# Patient Record
Sex: FEMALE | Race: WHITE | Employment: OTHER | URBAN - METROPOLITAN AREA
[De-identification: names, ages, dates, MRNs, and addresses within clinical notes are randomized per-mention and may not be internally consistent; named-entity substitution may affect disease eponyms.]

---

## 2018-06-03 ENCOUNTER — OFFICE VISIT (OUTPATIENT)
Dept: URGENT CARE | Facility: PHYSICIAN GROUP | Age: 78
End: 2018-06-03
Payer: MEDICARE

## 2018-06-03 VITALS
WEIGHT: 100 LBS | TEMPERATURE: 98.5 F | SYSTOLIC BLOOD PRESSURE: 158 MMHG | HEIGHT: 65 IN | HEART RATE: 72 BPM | RESPIRATION RATE: 18 BRPM | OXYGEN SATURATION: 95 % | DIASTOLIC BLOOD PRESSURE: 90 MMHG | BODY MASS INDEX: 16.66 KG/M2

## 2018-06-03 DIAGNOSIS — H61.23 BILATERAL IMPACTED CERUMEN: ICD-10-CM

## 2018-06-03 PROCEDURE — 69210 REMOVE IMPACTED EAR WAX UNI: CPT | Performed by: FAMILY MEDICINE

## 2018-06-03 ASSESSMENT — ENCOUNTER SYMPTOMS
NAUSEA: 0
VERTIGO: 0
FEVER: 0
HEADACHES: 0

## 2018-06-03 ASSESSMENT — PAIN SCALES - GENERAL: PAINLEVEL: NO PAIN

## 2018-06-03 NOTE — PATIENT INSTRUCTIONS
Cerumen Plug  A cerumen plug is having too much wax in your ear canal. The outer ear canal is lined with hairs and glands that secrete wax. This wax is called cerumen. This protects the ear canal. It also helps prevent material from entering the ear. Too much wax can cause a feeling of fullness in the ears, decreased hearing, ringing in the ears, or an earache. Sometimes your caregiver will remove a cerumen plug with an instrument called a curette. Or he/she may flush the ear canal with warm water from a syringe to remove the wax. You may simply be sent home to follow the home care instructions below for wax removal.  Generally ear wax does not have to be removed unless it is causing a problem such as one of those listed above. When too much wax is causing a problem, the following are a few home remedies which can be used to help this problem.  HOME CARE INSTRUCTIONS   · Put a couple drops of glycerin, baby oil, or mineral oil in the ear a couple times of day. Do this every day for several days. After putting the drops in, you will need to lay with the affected ear pointing up for a couple minutes. This allows the drops to remain in the canal and run down to the area of wax blockage. This will soften the wax plug. It may also make your hearing worse as the wax softens and blocks the canal even more.  · After a couple days, you may gently flush the ear canal with warm water from a syringe. Do this by pulling your ear up and back with your head tilted slightly forward and towards a pan to catch the water. This is most easily done with a helper. You can also accomplish the same thing by letting the shower beat into your ear canal to wash the wax out. Sometimes this will not be immediately successful. You will have to return to the first step of using the oil to further soften the wax. Then resume washing the ear canal out with a syringe or shower.  · Following removal of the wax, put ten to twenty drops of rubbing  alcohol into the outer ears. This will dry the canal and prevent an infection.  · Do not irrigate or wash out your ears if you have had a perforated ear drum or mastoid surgery.  SEEK IMMEDIATE MEDICAL CARE IF:   · You are unsuccessful with the above instructions for home care.  · You develop ear pain or drainage from the ear.  MAKE SURE YOU:   · Understand these instructions.  · Will watch your condition.  · Will get help right away if you are not doing well or get worse.  Document Released: 09/12/2002 Document Revised: 03/11/2013 Document Reviewed: 12/09/2009  [employer]® Patient Information ©2014 [employer], [place].

## 2018-06-03 NOTE — PROGRESS NOTES
"Subjective:   Tameka Montanez is a 77 y.o. female who presents for Cerumen Impaction (x 2 weeks)     Cerumen Impaction   This is a new problem. The current episode started 1 to 4 weeks ago. The problem occurs constantly. The problem has been gradually worsening. Pertinent negatives include no congestion, fever, headaches, nausea or vertigo.     Review of Systems   Constitutional: Negative for fever.   HENT: Negative for congestion.    Gastrointestinal: Negative for nausea.   Neurological: Negative for vertigo and headaches.      Objective:   /90   Pulse 72   Temp 36.9 °C (98.5 °F)   Resp 18   Ht 1.651 m (5' 5\")   Wt 45.4 kg (100 lb)   SpO2 95%   BMI 16.64 kg/m²   Physical Exam   Constitutional: She is oriented to person, place, and time. She appears well-developed and well-nourished. No distress.   HENT:   Bilateral cerumen impaction noted   Eyes: EOM are normal. Pupils are equal, round, and reactive to light.   Cardiovascular: Normal rate, regular rhythm, normal heart sounds and intact distal pulses.    Pulmonary/Chest: Effort normal and breath sounds normal. No respiratory distress.   Abdominal: Soft. Bowel sounds are normal. She exhibits no distension.   Musculoskeletal: Normal range of motion.   Neurological: She is alert and oriented to person, place, and time. She has normal reflexes.   Skin: Skin is warm and dry.   Psychiatric: She has a normal mood and affect. Her behavior is normal.        Assessment/Plan:   1. Bilateral impacted cerumen    Differential diagnosis, natural history, supportive care, and indications for immediate follow-up discussed.    Procedure: Cerumen Removal Right  Risks and benefits of procedure discussed  Cerumen removed with curette   Patient tolerated well  Post procedure exam with clear canal and normal TM  Procedure: Cerumen Removal Left  Risks and benefits of procedure discussed  Cerumen removed with curette  Patient tolerated well  Post procedure exam with " clear canal and normal TM

## 2019-06-28 ENCOUNTER — OFFICE VISIT (OUTPATIENT)
Dept: URGENT CARE | Facility: PHYSICIAN GROUP | Age: 79
End: 2019-06-28
Payer: MEDICARE

## 2019-06-28 ENCOUNTER — HOSPITAL ENCOUNTER (OUTPATIENT)
Dept: RADIOLOGY | Facility: MEDICAL CENTER | Age: 79
End: 2019-06-28
Attending: PHYSICIAN ASSISTANT
Payer: MEDICARE

## 2019-06-28 VITALS
TEMPERATURE: 98.8 F | OXYGEN SATURATION: 95 % | WEIGHT: 106.6 LBS | RESPIRATION RATE: 18 BRPM | SYSTOLIC BLOOD PRESSURE: 148 MMHG | BODY MASS INDEX: 17.76 KG/M2 | DIASTOLIC BLOOD PRESSURE: 80 MMHG | HEART RATE: 82 BPM | HEIGHT: 65 IN

## 2019-06-28 DIAGNOSIS — M25.531 RIGHT WRIST PAIN: ICD-10-CM

## 2019-06-28 DIAGNOSIS — S63.501A SPRAIN OF RIGHT WRIST, INITIAL ENCOUNTER: ICD-10-CM

## 2019-06-28 PROCEDURE — 99213 OFFICE O/P EST LOW 20 MIN: CPT | Performed by: PHYSICIAN ASSISTANT

## 2019-06-28 PROCEDURE — 73110 X-RAY EXAM OF WRIST: CPT | Mod: RT

## 2019-06-28 RX ORDER — ASPIRIN 81 MG/1
81 TABLET, CHEWABLE ORAL DAILY
Status: ON HOLD | COMMUNITY
End: 2020-02-24

## 2019-06-28 ASSESSMENT — ENCOUNTER SYMPTOMS
NUMBNESS: 0
SENSORY CHANGE: 0
TINGLING: 0
MUSCLE WEAKNESS: 0

## 2019-06-28 NOTE — PROGRESS NOTES
"Subjective:   Tameka Montanez is a 79 y.o. female who presents for Finger Injury (x 1 day, R thumb, radiates to wrist)       Wrist Pain    The incident occurred 12 to 24 hours ago. The incident occurred at home. Injury mechanism: trying to push plug into wall. The pain is present in the right wrist. The quality of the pain is described as aching. The pain does not radiate. The pain is mild. The pain has been constant since the incident. Pertinent negatives include no chest pain, muscle weakness, numbness or tingling. The symptoms are aggravated by palpation and movement. She has tried ice, heat and immobilization for the symptoms. The treatment provided mild relief.     Review of Systems   Cardiovascular: Negative for chest pain.   Musculoskeletal:        Positive for right wrist pain   Neurological: Negative for tingling, sensory change and numbness.       PMH:  has a past medical history of Peptic ulcer. She also has no past medical history of ASTHMA or Diabetes.    MEDS:   Current Outpatient Prescriptions:   •  aspirin (ASA) 81 MG Chew Tab chewable tablet, Take 81 mg by mouth every day., Disp: , Rfl:   •  DPH-Lido-AlHydr-MgHydr-Simeth SUSP, Spray  in mouth/throat. \"Magic Mouthwash\" 1:1:1 mix ratio. May substitute pre-made mix or carafate for lidocaine.  10cc gargle and spit every 2 hours as needed (Patient not taking: Reported on 6/28/2019), Disp: 240 mL, Rfl: 0    ALLERGIES:   Allergies   Allergen Reactions   • Sulfa Drugs    • Tetracycline        SURGHX:   Past Surgical History:   Procedure Laterality Date   • APPENDECTOMY         SOCHX:  reports that she has been smoking Cigarettes.  She has a 42.00 pack-year smoking history. She has never used smokeless tobacco. She reports that she does not drink alcohol or use drugs.    FH: Reviewed with patient, not pertinent to this visit.     Objective:   /80   Pulse 82   Temp 37.1 °C (98.8 °F) (Temporal)   Resp 18   Ht 1.651 m (5' 5\")   Wt 48.4 kg (106 " lb 9.6 oz)   SpO2 95%   BMI 17.74 kg/m²   Physical Exam   Constitutional: She is oriented to person, place, and time. She appears well-developed and well-nourished. No distress.   HENT:   Head: Normocephalic and atraumatic.   Nose: Nose normal.   Eyes: Conjunctivae and EOM are normal.   Neck: Normal range of motion. No tracheal deviation present.   Cardiovascular: Normal rate and regular rhythm.    Pulses:       Radial pulses are 2+ on the right side, and 2+ on the left side.   Pulmonary/Chest: Effort normal. No respiratory distress.   Musculoskeletal:        Right wrist: She exhibits tenderness, bony tenderness and swelling. She exhibits normal range of motion, no effusion, no crepitus, no deformity and no laceration.   Tenderness and swelling along lateral volar aspect of right wrist.    Neurological: She is alert and oriented to person, place, and time.   Skin: Skin is warm and dry. Capillary refill takes less than 2 seconds.   Psychiatric: She has a normal mood and affect. Her behavior is normal. Judgment and thought content normal.   Vitals reviewed.    - DX-WRIST-COMPLETE 3+ RIGHT   Impression: There is no acute displaced right wrist fracture.    Assessment/Plan:   1. Sprain of right wrist, initial encounter  - DX-WRIST-COMPLETE 3+ RIGHT; Future    Other orders  - aspirin (ASA) 81 MG Chew Tab chewable tablet; Take 81 mg by mouth every day.    - Advised to continue ice/heat  - Advised to continue wearing wrist brace   - Advised to try OTC ibuprofen/acetaminophen prn  - Advised to return if symptoms worsen or do not improve     Differential diagnosis, natural history, supportive care, and indications for immediate follow-up discussed.

## 2019-07-09 ENCOUNTER — HOSPITAL ENCOUNTER (OUTPATIENT)
Dept: LAB | Facility: MEDICAL CENTER | Age: 79
End: 2019-07-09
Attending: FAMILY MEDICINE
Payer: MEDICARE

## 2019-07-09 ENCOUNTER — OFFICE VISIT (OUTPATIENT)
Dept: URGENT CARE | Facility: PHYSICIAN GROUP | Age: 79
End: 2019-07-09
Payer: MEDICARE

## 2019-07-09 VITALS
TEMPERATURE: 97.6 F | SYSTOLIC BLOOD PRESSURE: 138 MMHG | DIASTOLIC BLOOD PRESSURE: 72 MMHG | BODY MASS INDEX: 17.66 KG/M2 | HEART RATE: 84 BPM | HEIGHT: 65 IN | WEIGHT: 106 LBS | OXYGEN SATURATION: 95 %

## 2019-07-09 DIAGNOSIS — R60.0 EDEMA OF BOTH LOWER EXTREMITIES: ICD-10-CM

## 2019-07-09 LAB
ALBUMIN SERPL BCP-MCNC: 3.5 G/DL (ref 3.2–4.9)
ALBUMIN/GLOB SERPL: 1.2 G/DL
ALP SERPL-CCNC: 142 U/L (ref 30–99)
ALT SERPL-CCNC: 87 U/L (ref 2–50)
ANION GAP SERPL CALC-SCNC: 8 MMOL/L (ref 0–11.9)
AST SERPL-CCNC: 128 U/L (ref 12–45)
BASOPHILS # BLD AUTO: 0.9 % (ref 0–1.8)
BASOPHILS # BLD: 0.08 K/UL (ref 0–0.12)
BILIRUB SERPL-MCNC: 0.9 MG/DL (ref 0.1–1.5)
BUN SERPL-MCNC: 12 MG/DL (ref 8–22)
CALCIUM SERPL-MCNC: 10.2 MG/DL (ref 8.5–10.5)
CHLORIDE SERPL-SCNC: 96 MMOL/L (ref 96–112)
CO2 SERPL-SCNC: 27 MMOL/L (ref 20–33)
CREAT SERPL-MCNC: 0.68 MG/DL (ref 0.5–1.4)
EOSINOPHIL # BLD AUTO: 0.08 K/UL (ref 0–0.51)
EOSINOPHIL NFR BLD: 0.9 % (ref 0–6.9)
ERYTHROCYTE [DISTWIDTH] IN BLOOD BY AUTOMATED COUNT: 56.1 FL (ref 35.9–50)
FASTING STATUS PATIENT QL REPORTED: NORMAL
GLOBULIN SER CALC-MCNC: 3 G/DL (ref 1.9–3.5)
GLUCOSE SERPL-MCNC: 77 MG/DL (ref 65–99)
HCT VFR BLD AUTO: 39.7 % (ref 37–47)
HGB BLD-MCNC: 13.5 G/DL (ref 12–16)
IMM GRANULOCYTES # BLD AUTO: 0.03 K/UL (ref 0–0.11)
IMM GRANULOCYTES NFR BLD AUTO: 0.3 % (ref 0–0.9)
LYMPHOCYTES # BLD AUTO: 1.42 K/UL (ref 1–4.8)
LYMPHOCYTES NFR BLD: 15.8 % (ref 22–41)
MCH RBC QN AUTO: 32.5 PG (ref 27–33)
MCHC RBC AUTO-ENTMCNC: 34 G/DL (ref 33.6–35)
MCV RBC AUTO: 95.7 FL (ref 81.4–97.8)
MONOCYTES # BLD AUTO: 0.96 K/UL (ref 0–0.85)
MONOCYTES NFR BLD AUTO: 10.7 % (ref 0–13.4)
NEUTROPHILS # BLD AUTO: 6.39 K/UL (ref 2–7.15)
NEUTROPHILS NFR BLD: 71.4 % (ref 44–72)
NRBC # BLD AUTO: 0 K/UL
NRBC BLD-RTO: 0 /100 WBC
PLATELET # BLD AUTO: 176 K/UL (ref 164–446)
PMV BLD AUTO: 10.7 FL (ref 9–12.9)
POTASSIUM SERPL-SCNC: 4.1 MMOL/L (ref 3.6–5.5)
PROT SERPL-MCNC: 6.5 G/DL (ref 6–8.2)
RBC # BLD AUTO: 4.15 M/UL (ref 4.2–5.4)
SODIUM SERPL-SCNC: 131 MMOL/L (ref 135–145)
WBC # BLD AUTO: 9 K/UL (ref 4.8–10.8)

## 2019-07-09 PROCEDURE — 36415 COLL VENOUS BLD VENIPUNCTURE: CPT

## 2019-07-09 PROCEDURE — 80053 COMPREHEN METABOLIC PANEL: CPT

## 2019-07-09 PROCEDURE — 99214 OFFICE O/P EST MOD 30 MIN: CPT | Performed by: FAMILY MEDICINE

## 2019-07-09 PROCEDURE — 85025 COMPLETE CBC W/AUTO DIFF WBC: CPT

## 2019-07-09 NOTE — PROGRESS NOTES
"Subjective:   Tameka Montanez is a 79 y.o. female who presents for Edema (ankles, x 3 days)        Edema   This is a new problem. The current episode started in the past 7 days. The problem occurs constantly. The problem has been waxing and waning. Pertinent negatives include no abdominal pain, chills, fever, nausea, sore throat or vomiting.     Review of Systems   Constitutional: Negative for chills and fever.   HENT: Negative for sore throat.    Gastrointestinal: Negative for abdominal pain, nausea and vomiting.     Allergies   Allergen Reactions   • Sulfa Drugs    • Tetracycline       Objective:   /72   Pulse 84   Temp 36.4 °C (97.6 °F) (Temporal)   Ht 1.651 m (5' 5\")   Wt 48.1 kg (106 lb)   SpO2 95%   BMI 17.64 kg/m²   Physical Exam   Constitutional: She is oriented to person, place, and time. She appears well-developed and well-nourished. No distress.   HENT:   Head: Normocephalic and atraumatic.   Eyes: Pupils are equal, round, and reactive to light. Conjunctivae and EOM are normal.   Cardiovascular: Normal rate and regular rhythm.    No murmur heard.  Pulmonary/Chest: Effort normal and breath sounds normal. No respiratory distress.   Abdominal: Soft. She exhibits no distension. There is no tenderness.   Musculoskeletal: She exhibits edema (3+ BLE to above knees).   Neurological: She is alert and oriented to person, place, and time. She has normal reflexes. No sensory deficit.   Skin: Skin is warm and dry.   Psychiatric: She has a normal mood and affect.         Assessment/Plan:   1. Edema of both lower extremities  - Comp Metabolic Panel; Future  - CBC WITH DIFFERENTIAL; Future  Concern for liver failure, AKF or similar.  Differential diagnosis, natural history, supportive care, and indications for immediate follow-up discussed.       "

## 2019-07-10 ASSESSMENT — ENCOUNTER SYMPTOMS
EDEMA: 1
ABDOMINAL PAIN: 0
NAUSEA: 0
VOMITING: 0
SORE THROAT: 0
CHILLS: 0
FEVER: 0

## 2020-02-13 ENCOUNTER — HOSPITAL ENCOUNTER (INPATIENT)
Facility: MEDICAL CENTER | Age: 80
LOS: 11 days | DRG: 329 | End: 2020-02-24
Attending: HOSPITALIST | Admitting: HOSPITALIST
Payer: MEDICARE

## 2020-02-13 ENCOUNTER — HOSPITAL ENCOUNTER (OUTPATIENT)
Facility: MEDICAL CENTER | Age: 80
DRG: 329 | End: 2020-02-13
Admitting: HOSPITALIST
Payer: MEDICARE

## 2020-02-13 DIAGNOSIS — K92.2 GASTROINTESTINAL HEMORRHAGE, UNSPECIFIED GASTROINTESTINAL HEMORRHAGE TYPE: ICD-10-CM

## 2020-02-13 DIAGNOSIS — D64.9 ANEMIA REQUIRING TRANSFUSIONS: ICD-10-CM

## 2020-02-13 DIAGNOSIS — K63.89 COLONIC MASS: ICD-10-CM

## 2020-02-13 DIAGNOSIS — J81.0 ACUTE PULMONARY EDEMA (HCC): ICD-10-CM

## 2020-02-13 PROCEDURE — 770020 HCHG ROOM/CARE - TELE (206)

## 2020-02-13 RX ORDER — LISINOPRIL 5 MG/1
5 TABLET ORAL DAILY
Status: ON HOLD | COMMUNITY
End: 2020-02-24

## 2020-02-13 RX ORDER — FUROSEMIDE 40 MG/1
20 TABLET ORAL DAILY
Status: ON HOLD | COMMUNITY
End: 2020-02-24

## 2020-02-13 RX ORDER — TRAMADOL HYDROCHLORIDE 50 MG/1
50 TABLET ORAL PRN
Status: ON HOLD | COMMUNITY
End: 2020-02-24

## 2020-02-13 RX ORDER — POTASSIUM CITRATE 5 MEQ/1
20 TABLET, EXTENDED RELEASE ORAL DAILY
Status: ON HOLD | COMMUNITY
End: 2020-02-24

## 2020-02-13 RX ORDER — METOPROLOL SUCCINATE 25 MG/1
25 TABLET, EXTENDED RELEASE ORAL DAILY
Status: ON HOLD | COMMUNITY
End: 2020-02-24

## 2020-02-13 RX ORDER — ATORVASTATIN CALCIUM 20 MG/1
40 TABLET, FILM COATED ORAL DAILY
Status: ON HOLD | COMMUNITY
End: 2020-02-24

## 2020-02-13 RX ORDER — CLOPIDOGREL BISULFATE 75 MG/1
75 TABLET ORAL DAILY
Status: ON HOLD | COMMUNITY
End: 2020-02-24

## 2020-02-13 RX ORDER — BUSPIRONE HYDROCHLORIDE 5 MG/1
5 TABLET ORAL 3 TIMES DAILY
COMMUNITY

## 2020-02-13 ASSESSMENT — LIFESTYLE VARIABLES
AVERAGE NUMBER OF DAYS PER WEEK YOU HAVE A DRINK CONTAINING ALCOHOL: 0
EVER_SMOKED: YES
HOW MANY TIMES IN THE PAST YEAR HAVE YOU HAD 5 OR MORE DRINKS IN A DAY: 0
EVER FELT BAD OR GUILTY ABOUT YOUR DRINKING: NO
ON A TYPICAL DAY WHEN YOU DRINK ALCOHOL HOW MANY DRINKS DO YOU HAVE: 0
TOTAL SCORE: 0
PACK_YEARS: 0.5
CONSUMPTION TOTAL: NEGATIVE
HAVE PEOPLE ANNOYED YOU BY CRITICIZING YOUR DRINKING: NO
EVER HAD A DRINK FIRST THING IN THE MORNING TO STEADY YOUR NERVES TO GET RID OF A HANGOVER: NO
HAVE YOU EVER FELT YOU SHOULD CUT DOWN ON YOUR DRINKING: NO
ALCOHOL_USE: NO

## 2020-02-13 ASSESSMENT — PATIENT HEALTH QUESTIONNAIRE - PHQ9
1. LITTLE INTEREST OR PLEASURE IN DOING THINGS: NOT AT ALL
2. FEELING DOWN, DEPRESSED, IRRITABLE, OR HOPELESS: NOT AT ALL
SUM OF ALL RESPONSES TO PHQ9 QUESTIONS 1 AND 2: 0

## 2020-02-14 ENCOUNTER — ANESTHESIA EVENT (OUTPATIENT)
Dept: SURGERY | Facility: MEDICAL CENTER | Age: 80
DRG: 329 | End: 2020-02-14
Payer: MEDICARE

## 2020-02-14 ENCOUNTER — ANESTHESIA (OUTPATIENT)
Dept: SURGERY | Facility: MEDICAL CENTER | Age: 80
DRG: 329 | End: 2020-02-14
Payer: MEDICARE

## 2020-02-14 PROBLEM — D64.9 ANEMIA REQUIRING TRANSFUSIONS: Status: ACTIVE | Noted: 2020-02-14

## 2020-02-14 PROBLEM — E78.5 HLD (HYPERLIPIDEMIA): Status: ACTIVE | Noted: 2020-02-14

## 2020-02-14 PROBLEM — K92.2 GIB (GASTROINTESTINAL BLEEDING): Status: ACTIVE | Noted: 2020-02-14

## 2020-02-14 PROBLEM — J44.1 CHRONIC OBSTRUCTIVE PULMONARY DISEASE WITH ACUTE EXACERBATION (HCC): Status: ACTIVE | Noted: 2020-02-14

## 2020-02-14 PROBLEM — I10 HTN (HYPERTENSION): Status: ACTIVE | Noted: 2020-02-14

## 2020-02-14 PROBLEM — I73.9 PAD (PERIPHERAL ARTERY DISEASE) (HCC): Status: ACTIVE | Noted: 2020-02-14

## 2020-02-14 LAB
ABO + RH BLD: NORMAL
ABO GROUP BLD: NORMAL
ANION GAP SERPL CALC-SCNC: 9 MMOL/L (ref 0–11.9)
BLD GP AB SCN SERPL QL: NORMAL
BUN SERPL-MCNC: 39 MG/DL (ref 8–22)
CALCIUM SERPL-MCNC: 8.5 MG/DL (ref 8.5–10.5)
CHLORIDE SERPL-SCNC: 111 MMOL/L (ref 96–112)
CO2 SERPL-SCNC: 20 MMOL/L (ref 20–33)
CREAT SERPL-MCNC: 0.81 MG/DL (ref 0.5–1.4)
EKG IMPRESSION: NORMAL
ERYTHROCYTE [DISTWIDTH] IN BLOOD BY AUTOMATED COUNT: 62.2 FL (ref 35.9–50)
GLUCOSE SERPL-MCNC: 78 MG/DL (ref 65–99)
HCT VFR BLD AUTO: 25.4 % (ref 37–47)
HGB BLD-MCNC: 7.7 G/DL (ref 12–16)
HGB BLD-MCNC: 8.3 G/DL (ref 12–16)
HGB BLD-MCNC: 8.4 G/DL (ref 12–16)
HGB BLD-MCNC: 8.5 G/DL (ref 12–16)
MCH RBC QN AUTO: 23.8 PG (ref 27–33)
MCHC RBC AUTO-ENTMCNC: 30.3 G/DL (ref 33.6–35)
MCV RBC AUTO: 78.6 FL (ref 81.4–97.8)
PLATELET # BLD AUTO: 135 K/UL (ref 164–446)
PMV BLD AUTO: 10.4 FL (ref 9–12.9)
POTASSIUM SERPL-SCNC: 3.5 MMOL/L (ref 3.6–5.5)
RBC # BLD AUTO: 3.23 M/UL (ref 4.2–5.4)
RH BLD: NORMAL
SODIUM SERPL-SCNC: 140 MMOL/L (ref 135–145)
WBC # BLD AUTO: 5.1 K/UL (ref 4.8–10.8)

## 2020-02-14 PROCEDURE — 86901 BLOOD TYPING SEROLOGIC RH(D): CPT

## 2020-02-14 PROCEDURE — 99223 1ST HOSP IP/OBS HIGH 75: CPT | Performed by: HOSPITALIST

## 2020-02-14 PROCEDURE — 86850 RBC ANTIBODY SCREEN: CPT

## 2020-02-14 PROCEDURE — A9270 NON-COVERED ITEM OR SERVICE: HCPCS | Performed by: HOSPITALIST

## 2020-02-14 PROCEDURE — 0DJ08ZZ INSPECTION OF UPPER INTESTINAL TRACT, VIA NATURAL OR ARTIFICIAL OPENING ENDOSCOPIC: ICD-10-PCS | Performed by: INTERNAL MEDICINE

## 2020-02-14 PROCEDURE — 160048 HCHG OR STATISTICAL LEVEL 1-5: Performed by: INTERNAL MEDICINE

## 2020-02-14 PROCEDURE — 500066 HCHG BITE BLOCK, ECT: Performed by: INTERNAL MEDICINE

## 2020-02-14 PROCEDURE — 85018 HEMOGLOBIN: CPT | Mod: 91

## 2020-02-14 PROCEDURE — 770020 HCHG ROOM/CARE - TELE (206)

## 2020-02-14 PROCEDURE — 700105 HCHG RX REV CODE 258: Performed by: HOSPITALIST

## 2020-02-14 PROCEDURE — 160002 HCHG RECOVERY MINUTES (STAT): Performed by: INTERNAL MEDICINE

## 2020-02-14 PROCEDURE — 36415 COLL VENOUS BLD VENIPUNCTURE: CPT

## 2020-02-14 PROCEDURE — 93005 ELECTROCARDIOGRAM TRACING: CPT | Performed by: INTERNAL MEDICINE

## 2020-02-14 PROCEDURE — 700102 HCHG RX REV CODE 250 W/ 637 OVERRIDE(OP): Performed by: HOSPITALIST

## 2020-02-14 PROCEDURE — 700111 HCHG RX REV CODE 636 W/ 250 OVERRIDE (IP): Performed by: ANESTHESIOLOGY

## 2020-02-14 PROCEDURE — 160202 HCHG ENDO MINUTES - 1ST 30 MINS LEVEL 3: Performed by: INTERNAL MEDICINE

## 2020-02-14 PROCEDURE — 86900 BLOOD TYPING SEROLOGIC ABO: CPT

## 2020-02-14 PROCEDURE — 80048 BASIC METABOLIC PNL TOTAL CA: CPT

## 2020-02-14 PROCEDURE — 700101 HCHG RX REV CODE 250: Performed by: INTERNAL MEDICINE

## 2020-02-14 PROCEDURE — 700111 HCHG RX REV CODE 636 W/ 250 OVERRIDE (IP): Performed by: HOSPITALIST

## 2020-02-14 PROCEDURE — 99358 PROLONG SERVICE W/O CONTACT: CPT | Performed by: HOSPITALIST

## 2020-02-14 PROCEDURE — 160009 HCHG ANES TIME/MIN: Performed by: INTERNAL MEDICINE

## 2020-02-14 PROCEDURE — 85027 COMPLETE CBC AUTOMATED: CPT

## 2020-02-14 PROCEDURE — 93010 ELECTROCARDIOGRAM REPORT: CPT | Performed by: INTERNAL MEDICINE

## 2020-02-14 PROCEDURE — 160035 HCHG PACU - 1ST 60 MINS PHASE I: Performed by: INTERNAL MEDICINE

## 2020-02-14 PROCEDURE — C9113 INJ PANTOPRAZOLE SODIUM, VIA: HCPCS | Performed by: HOSPITALIST

## 2020-02-14 RX ORDER — SODIUM CHLORIDE, SODIUM LACTATE, POTASSIUM CHLORIDE, CALCIUM CHLORIDE 600; 310; 30; 20 MG/100ML; MG/100ML; MG/100ML; MG/100ML
INJECTION, SOLUTION INTRAVENOUS CONTINUOUS
Status: DISCONTINUED | OUTPATIENT
Start: 2020-02-14 | End: 2020-02-14 | Stop reason: HOSPADM

## 2020-02-14 RX ORDER — LISINOPRIL 5 MG/1
5 TABLET ORAL DAILY
Status: DISCONTINUED | OUTPATIENT
Start: 2020-02-14 | End: 2020-02-17

## 2020-02-14 RX ORDER — ONDANSETRON 2 MG/ML
4 INJECTION INTRAMUSCULAR; INTRAVENOUS
Status: DISCONTINUED | OUTPATIENT
Start: 2020-02-14 | End: 2020-02-14 | Stop reason: HOSPADM

## 2020-02-14 RX ORDER — GABAPENTIN 100 MG/1
100 CAPSULE ORAL 3 TIMES DAILY
Status: DISCONTINUED | OUTPATIENT
Start: 2020-02-14 | End: 2020-02-24 | Stop reason: HOSPADM

## 2020-02-14 RX ORDER — BUSPIRONE HYDROCHLORIDE 10 MG/1
5 TABLET ORAL 3 TIMES DAILY
Status: DISCONTINUED | OUTPATIENT
Start: 2020-02-14 | End: 2020-02-24 | Stop reason: HOSPADM

## 2020-02-14 RX ORDER — POLYETHYLENE GLYCOL 3350 17 G/17G
1 POWDER, FOR SOLUTION ORAL
Status: DISCONTINUED | OUTPATIENT
Start: 2020-02-14 | End: 2020-02-24 | Stop reason: HOSPADM

## 2020-02-14 RX ORDER — ATORVASTATIN CALCIUM 80 MG/1
80 TABLET, FILM COATED ORAL DAILY
Status: DISCONTINUED | OUTPATIENT
Start: 2020-02-14 | End: 2020-02-24 | Stop reason: HOSPADM

## 2020-02-14 RX ORDER — SODIUM CHLORIDE, SODIUM LACTATE, POTASSIUM CHLORIDE, CALCIUM CHLORIDE 600; 310; 30; 20 MG/100ML; MG/100ML; MG/100ML; MG/100ML
INJECTION, SOLUTION INTRAVENOUS CONTINUOUS
Status: DISCONTINUED | OUTPATIENT
Start: 2020-02-14 | End: 2020-02-16

## 2020-02-14 RX ORDER — ONDANSETRON 2 MG/ML
4 INJECTION INTRAMUSCULAR; INTRAVENOUS EVERY 4 HOURS PRN
Status: DISCONTINUED | OUTPATIENT
Start: 2020-02-14 | End: 2020-02-24 | Stop reason: HOSPADM

## 2020-02-14 RX ORDER — METOPROLOL SUCCINATE 25 MG/1
25 TABLET, EXTENDED RELEASE ORAL DAILY
Status: DISCONTINUED | OUTPATIENT
Start: 2020-02-14 | End: 2020-02-17

## 2020-02-14 RX ORDER — OMEPRAZOLE 20 MG/1
20 CAPSULE, DELAYED RELEASE ORAL 2 TIMES DAILY
COMMUNITY

## 2020-02-14 RX ORDER — ONDANSETRON 4 MG/1
4 TABLET, ORALLY DISINTEGRATING ORAL EVERY 4 HOURS PRN
Status: DISCONTINUED | OUTPATIENT
Start: 2020-02-14 | End: 2020-02-24 | Stop reason: HOSPADM

## 2020-02-14 RX ORDER — AMOXICILLIN 250 MG
2 CAPSULE ORAL 2 TIMES DAILY
Status: DISCONTINUED | OUTPATIENT
Start: 2020-02-14 | End: 2020-02-24 | Stop reason: HOSPADM

## 2020-02-14 RX ORDER — ACETAMINOPHEN 325 MG/1
650 TABLET ORAL EVERY 6 HOURS PRN
Status: DISCONTINUED | OUTPATIENT
Start: 2020-02-14 | End: 2020-02-24 | Stop reason: HOSPADM

## 2020-02-14 RX ORDER — BISACODYL 10 MG
10 SUPPOSITORY, RECTAL RECTAL
Status: DISCONTINUED | OUTPATIENT
Start: 2020-02-14 | End: 2020-02-24 | Stop reason: HOSPADM

## 2020-02-14 RX ADMIN — SODIUM CHLORIDE, POTASSIUM CHLORIDE, SODIUM LACTATE AND CALCIUM CHLORIDE: 600; 310; 30; 20 INJECTION, SOLUTION INTRAVENOUS at 00:41

## 2020-02-14 RX ADMIN — SODIUM CHLORIDE, POTASSIUM CHLORIDE, SODIUM LACTATE AND CALCIUM CHLORIDE: 600; 310; 30; 20 INJECTION, SOLUTION INTRAVENOUS at 18:38

## 2020-02-14 RX ADMIN — ACETAMINOPHEN 650 MG: 325 TABLET, FILM COATED ORAL at 18:48

## 2020-02-14 RX ADMIN — LISINOPRIL 5 MG: 5 TABLET ORAL at 05:16

## 2020-02-14 RX ADMIN — SENNOSIDES AND DOCUSATE SODIUM 2 TABLET: 8.6; 5 TABLET ORAL at 18:37

## 2020-02-14 RX ADMIN — PROPOFOL 100 MCG/KG/MIN: 10 INJECTION, EMULSION INTRAVENOUS at 14:42

## 2020-02-14 RX ADMIN — ATORVASTATIN CALCIUM 80 MG: 80 TABLET, FILM COATED ORAL at 02:00

## 2020-02-14 RX ADMIN — GABAPENTIN 100 MG: 100 CAPSULE ORAL at 18:37

## 2020-02-14 RX ADMIN — BENZOCAINE AND MENTHOL 1 LOZENGE: 15; 3.6 LOZENGE ORAL at 05:16

## 2020-02-14 RX ADMIN — NYSTATIN 500000 UNITS: 100000 SUSPENSION ORAL at 18:37

## 2020-02-14 RX ADMIN — BUSPIRONE HYDROCHLORIDE 5 MG: 10 TABLET ORAL at 18:38

## 2020-02-14 RX ADMIN — SODIUM CHLORIDE 8 MG/HR: 9 INJECTION, SOLUTION INTRAVENOUS at 03:12

## 2020-02-14 RX ADMIN — BUSPIRONE HYDROCHLORIDE 5 MG: 10 TABLET ORAL at 20:57

## 2020-02-14 RX ADMIN — SODIUM CHLORIDE 80 MG: 9 INJECTION, SOLUTION INTRAVENOUS at 02:50

## 2020-02-14 RX ADMIN — NYSTATIN 500000 UNITS: 100000 SUSPENSION ORAL at 08:21

## 2020-02-14 RX ADMIN — ACETAMINOPHEN 650 MG: 325 TABLET, FILM COATED ORAL at 02:51

## 2020-02-14 RX ADMIN — NYSTATIN 500000 UNITS: 100000 SUSPENSION ORAL at 20:57

## 2020-02-14 RX ADMIN — SODIUM CHLORIDE 8 MG/HR: 9 INJECTION, SOLUTION INTRAVENOUS at 18:48

## 2020-02-14 RX ADMIN — METOPROLOL SUCCINATE 25 MG: 25 TABLET, EXTENDED RELEASE ORAL at 05:16

## 2020-02-14 RX ADMIN — POLYETHYLENE GLYCOL 3350, SODIUM SULFATE ANHYDROUS, SODIUM BICARBONATE, SODIUM CHLORIDE, POTASSIUM CHLORIDE 4 L: 236; 22.74; 6.74; 5.86; 2.97 POWDER, FOR SOLUTION ORAL at 18:38

## 2020-02-14 RX ADMIN — NYSTATIN 500000 UNITS: 100000 SUSPENSION ORAL at 00:34

## 2020-02-14 RX ADMIN — BUSPIRONE HYDROCHLORIDE 5 MG: 10 TABLET ORAL at 05:16

## 2020-02-14 ASSESSMENT — COGNITIVE AND FUNCTIONAL STATUS - GENERAL
CLIMB 3 TO 5 STEPS WITH RAILING: A LITTLE
SUGGESTED CMS G CODE MODIFIER DAILY ACTIVITY: CI
DRESSING REGULAR LOWER BODY CLOTHING: A LITTLE
WALKING IN HOSPITAL ROOM: A LITTLE
SUGGESTED CMS G CODE MODIFIER MOBILITY: CK
MOBILITY SCORE: 18
TURNING FROM BACK TO SIDE WHILE IN FLAT BAD: A LITTLE
STANDING UP FROM CHAIR USING ARMS: A LITTLE
MOVING FROM LYING ON BACK TO SITTING ON SIDE OF FLAT BED: A LITTLE
MOVING TO AND FROM BED TO CHAIR: A LITTLE
DAILY ACTIVITIY SCORE: 23

## 2020-02-14 ASSESSMENT — ENCOUNTER SYMPTOMS
TINGLING: 0
COUGH: 0
MYALGIAS: 0
WHEEZING: 0
VOMITING: 0
PHOTOPHOBIA: 0
SHORTNESS OF BREATH: 0
NAUSEA: 0
CHILLS: 0
DIARRHEA: 0
SORE THROAT: 0
ABDOMINAL PAIN: 0
FOCAL WEAKNESS: 0
HEADACHES: 0
FEVER: 0
PALPITATIONS: 0
DEPRESSION: 0
DIZZINESS: 0

## 2020-02-14 ASSESSMENT — PAIN SCALES - GENERAL: PAIN_LEVEL: 0

## 2020-02-14 NOTE — PROGRESS NOTES
Patient A+Ox4, on room air, sitting up in bed with  at bedside. On tele, denies pain at this time. Anxious about plan of care, this reviewed with patient. NPO at this time for possible GI intervention. Using call bell, bed locked and in lowest position, whiteboard updated,  and patient aware of plan of care.  also provided an updated medication list, a copy is in chart, original returned to , MD Claire aware

## 2020-02-14 NOTE — ANESTHESIA PREPROCEDURE EVALUATION
Relevant Problems   PULMONARY   (+) Chronic obstructive pulmonary disease with acute exacerbation (HCC)      CARDIAC   (+) HTN (hypertension)   (+) PAD (peripheral artery disease) (HCC)      Other   (+) Anemia requiring transfusions   (+) GIB (gastrointestinal bleeding)       Physical Exam    Anesthesia Plan    ASA 3- EMERGENT   ASA physical status 3 criteria: respiratory insufficiency or compromise, alcohol and/or substance dependence or abuse and COPDASA physical status emergent criteria: acute hemorrhage    Plan - MAC             Induction: intravenous    Postoperative Plan: Postoperative administration of opioids is intended.    Pertinent diagnostic labs and testing reviewed    Informed Consent:    Anesthetic plan and risks discussed with patient.    Use of blood products discussed with: patient whom consented to blood products.

## 2020-02-14 NOTE — PROGRESS NOTES
Pt moderate fall risk. Refusing bed alarm. Educated pt on fall risks. Charge RN notified. Pt reeducated. Refusing bed alarm. Pt educated on use of call light with return demonstration.

## 2020-02-14 NOTE — ANESTHESIA POSTPROCEDURE EVALUATION
Patient: Tameka Montanez    Procedure Summary     Date:  02/14/20 Room / Location:  [facility] ROOM 22 / SURGERY SAME DAY [facility]    Anesthesia Start:  1438 Anesthesia Stop:  1454    Procedure:  GASTROSCOPY (N/A Mouth) Diagnosis:  (normal)    Surgeon:  Jc Rolon M.D. Responsible Provider:  Quinn Osman M.D.    Anesthesia Type:  MAC ASA Status:  3 - Emergent          Final Anesthesia Type: MAC  Last vitals  BP   Blood Pressure : (!) 172/72    Temp   36.9 °C (98.4 °F)    Pulse   Pulse: 78   Resp   (!) 24    SpO2   93 %      Anesthesia Post Evaluation    Patient location during evaluation: PACU  Patient participation: complete - patient participated  Level of consciousness: awake and alert  Pain score: 0    Airway patency: patent  Anesthetic complications: no  Cardiovascular status: hemodynamically stable  Respiratory status: acceptable  Hydration status: euvolemic    PONV: none           Nurse Pain Score: 1 (NPRS)

## 2020-02-14 NOTE — PROGRESS NOTES
Received pt from transporting RN. Pt placed on tele monitor; monitor room notified; SR 77. Pt weighed, VS completed; pt A&Ox4. Med rec complete; MD notified of arrival. No complaints at this time. POC discussed; all questions answered. Bed locked in lowest position; call light in reach; fall precautions in place.

## 2020-02-14 NOTE — ANESTHESIA TIME REPORT
Anesthesia Start and Stop Event Times     Date Time Event    2/14/2020 1425 Ready for Procedure     1438 Anesthesia Start     1454 Anesthesia Stop        Responsible Staff  02/14/20    Name Role Begin End    Quinn Osman M.D. Anesth 1438 1452        Preop Diagnosis (Free Text):  Pre-op Diagnosis     anemia        Preop Diagnosis (Codes):    Post op Diagnosis  GI hemorrhage      Premium Reason  Non-Premium    Comments:

## 2020-02-14 NOTE — CARE PLAN
Problem: Safety  Goal: Will remain free from falls  Outcome: PROGRESSING AS EXPECTED   Pt moderate fall risk; refusing bed alarm; educated on use of call light.    Problem: Knowledge Deficit  Goal: Knowledge of disease process/condition, treatment plan, diagnostic tests, and medications will improve  Outcome: PROGRESSING AS EXPECTED   Pt verbalizes understanding of POC.    Problem: Psychosocial Needs:  Goal: Level of anxiety will decrease  Outcome: PROGRESSING SLOWER THAN EXPECTED   Pt restless.

## 2020-02-14 NOTE — ASSESSMENT & PLAN NOTE
-S/p angioplasty in December with Dr. Mohsin of vascular surgery.  Has been taking aspirin and plavix at home, which are currently being held in light of her GI bleed & anemia.  -Continue home atorvastatin.

## 2020-02-14 NOTE — PROGRESS NOTES
Patient left the floor at this time with transport for EGD. Pre op RN with patient and zoll for transport

## 2020-02-14 NOTE — PROGRESS NOTES
2 RN Skin Check    2 RN skin check complete Alexis RN  Devices in place: tele monitor; adhesive bandage to R foot bunion (1st toe)  Skin assessed under devices: yes.  Confirmed pressure ulcers found on: none.  New potential pressure ulcers noted on none  Wound consult placed N/A. Pictures taken and in EPIC  The following interventions in place Pillows/Barrier cream; adhesive bandage    Bilat ears intact. Bilat elbows red/blanching. Sacrum red/blanching. Bilat heels dry/calloused/blanching.

## 2020-02-14 NOTE — ASSESSMENT & PLAN NOTE
-Home lisinopril and lopressor held for allow BP room for effective diuresis. 90s-120s overnight.   -Continue to trend per unit protocol.

## 2020-02-14 NOTE — PROGRESS NOTES
Patient presented to [facility] Hb 5.2.  Guaiac +. She is followed by [facility]. She actually presented for a staple removal and by the way looked pale. They checked labs.  They do not have GI at [facility].   Please call [facility] in the morning for consultation.   2 units RBCs ordered prior to transfer.

## 2020-02-14 NOTE — H&P
"Hospital Medicine History & Physical Note    Date of Service  2/14/2020    Primary Care Physician  Pcp Pt States None    Consultants  Pending    Code Status  Full    Chief Complaint  Transferred from outside facility for anemia requiring transfusion and suspected GI bleed    History of Presenting Illness  79 y.o. female who presented on 2/13/2020 in transfer from outside facility for anemia requiring transfusion and suspected GI bleed.  Medical records from outside facility indicates that she presented to their facility with complaints of chest pain associated with palpitations.  However the patient denies this and states that she went to the emergency room to have staples removed from her head which was status post fall with head wound 1 week ago.  Patient states that at that time, she had \"lost a lot of blood\" and that she had been instructed to return for a repeat scan of her painful right hip as well as staple removal.  Patient denies any nausea, vomiting, hemoptysis, hematemesis, melena, or bright red blood per rectum.  In fact, she apparently has had hard stools for the past week.  However, by report occult stool obtained at the outside facility was positive.  She was noted to have severe anemia and with recent GI bleed and monitor for correction factor there was concern for reoccurrence therefore she was transferred to our facility for higher level of care.    Patient does confirm that in November she was diagnosed with GI bleed and required 2 units of packed red blood cells transfused.  I do not have November records to confirm but current emergency room note from outside facility states that the patient was seen by Dr. [facility] and had EGD which showed erosion and healed ulcers in the antrum without active bleeding.  In December, the patient was seen by Dr. Mohsin of vascular surgery and underwent angioplasty of the right leg.    Work-up at the outside facility includes WBC 6.0 " hemoglobin 5.2 hematocrit 19.7 platelet 172 sodium 143 potassium 4.3 chloride 109 CO2 24 BUN 44 creatinine 0.9 and glucose 82.  Troponin less than 0.01 CT scan of the chest shows chronic changes which are consistent with prior CT scan.  By report, occult stool was positive.  She was transferred to our facility as [facility] hospital does not have GI consultation.    Review of Systems  Review of Systems   Constitutional: Negative for chills and fever.   HENT: Negative for congestion and sore throat.    Eyes: Negative for photophobia.   Respiratory: Negative for cough, shortness of breath and wheezing.    Cardiovascular: Negative for chest pain and palpitations.   Gastrointestinal: Negative for abdominal pain, diarrhea, nausea and vomiting.   Genitourinary: Negative for dysuria.   Musculoskeletal: Negative for myalgias.   Skin: Negative.    Neurological: Negative for dizziness, tingling, focal weakness and headaches.   Psychiatric/Behavioral: Negative for depression and suicidal ideas.       Past Medical History  Past Medical History:   Diagnosis Date   • Peptic ulcer        Surgical History  Past Surgical History:   Procedure Laterality Date   • APPENDECTOMY         Family History  Patient denies any history of MIs or CVAs    Social History  Social History     Tobacco Use   • Smoking status: Current Every Day Smoker     Packs/day: 1.00     Years: 42.00     Pack years: 42.00     Types: Cigarettes   • Smokeless tobacco: Never Used   Substance Use Topics   • Alcohol use: No   • Drug use: No       Allergies  Allergies   Allergen Reactions   • Sulfa Drugs    • Tetracycline        Medications  No current facility-administered medications on file prior to encounter.      Current Outpatient Medications on File Prior to Encounter   Medication Sig Dispense Refill   • clopidogrel (PLAVIX) 75 MG Tab Take 75 mg by mouth every day.     • busPIRone (BUSPAR) 5 MG tablet Take 5 mg by mouth 3 times a day.     • atorvastatin (LIPITOR) 20  "MG Tab Take 80 mg by mouth every day.     • tramadol (ULTRAM) 50 MG Tab Take 50 mg by mouth as needed for Mild Pain.     • potassium citrate (UROCIT-K) 5 MEQ (540 MG) Tab CR Take 20 mEq by mouth every day.     • furosemide (LASIX) 40 MG Tab Take 40 mg by mouth every day.     • metoprolol SR (TOPROL XL) 25 MG TABLET SR 24 HR Take 25 mg by mouth every day.     • lisinopril (PRINIVIL) 5 MG Tab Take 5 mg by mouth every day.     • aspirin (ASA) 81 MG Chew Tab chewable tablet Take 81 mg by mouth every day.     • DPH-Lido-AlHydr-MgHydr-Simeth SUSP Spray  in mouth/throat. \"Magic Mouthwash\" 1:1:1 mix ratio. May substitute pre-made mix or carafate for lidocaine.    10cc gargle and spit every 2 hours as needed (Patient not taking: Reported on 2019) 240 mL 0       Physical Exam  Hemodynamics  Temp (24hrs), Av.2 °C (97.2 °F), Min:36.2 °C (97.2 °F), Max:36.2 °C (97.2 °F)   Temperature: 36.2 °C (97.2 °F)  Pulse  Av  Min: 71  Max: 71    Blood Pressure : 141/70      Respiratory      Pulse Oximetry: 96 %             Physical Exam   Constitutional: She is oriented to person, place, and time. No distress.   Elderly, thin, frail   HENT:   Head: Normocephalic and atraumatic.   Right Ear: External ear normal.   Left Ear: External ear normal.   Left periorbital hematoma, old and healing   Eyes: EOM are normal. Right eye exhibits no discharge. Left eye exhibits no discharge.   Neck: Neck supple. No JVD present.   Cardiovascular: Normal rate, regular rhythm and normal heart sounds.   Pulmonary/Chest: Effort normal and breath sounds normal. No respiratory distress. She exhibits no tenderness.   Abdominal: Soft. Bowel sounds are normal. She exhibits no distension. There is no abdominal tenderness.   Musculoskeletal: Normal range of motion.         General: No edema.   Neurological: She is alert and oriented to person, place, and time. No cranial nerve deficit.   Skin: Skin is warm and dry. She is not diaphoretic. No erythema. "   Minimal bruising on right hip   Psychiatric: She has a normal mood and affect. Her behavior is normal.   Nursing note and vitals reviewed.    Capillary refill less than 3 seconds, distal pulses intact    Laboratory:          No results for input(s): ALTSGPT, ASTSGOT, ALKPHOSPHAT, TBILIRUBIN, DBILIRUBIN, GAMMAGT, AMYLASE, LIPASE, ALB, PREALBUMIN, GLUCOSE in the last 72 hours.              No results found for: TROPONINI    Imaging  No results found.      Assessment/Plan:  Anticipate that patient will need greater than 2 midnights for management of the discussed medical issues.    * GIB (gastrointestinal bleeding)  Assessment & Plan  Patient with a prior history of previous GI bleed last fall.  Records from outside facility indicates that an occult stool was done in the emergency room and was positive.  She currently is now status post 2 units packed red blood cell transfusion and remains hemodynamically stable.  We will continue to watch her closely on the telemetry floor, provide gentle IV fluids for volume expansion and continue to trend hemoglobin levels.  Should they drop below 7, then we will plan to repeat transfusion.  I have started her on a PPI drip and will keep her n.p.o. with plans to consult GI in the morning.  She was last seen by [facility].    Anemia requiring transfusions  Assessment & Plan  Now status post 2 units, treatment as noted above.    HTN (hypertension)  Assessment & Plan  Patient remains normotensive, okay to continue home metoprolol and Prinivil but will hold Lasix for tonight.  No evidence of volume overload.    PAD (peripheral artery disease) (HCC)  Assessment & Plan  Patient states that she underwent angioplasty in December with Dr. Mohsin of vascular surgery.  She has been taking aspirin and Plavix in addition to her Lipitor.  Holding both aspirin and Plavix for now, okay to continue home Lipitor.      Prophylaxis: Sequential compression devices for DVT prophylaxis,   PPI indicated, bowel protocol as needed    I spent a total of 35 minutes of non face to face time performing additional research, reviewing medical records from transferring facility, discussing plan of care with other healthcare providers. Start time: 11:30PM. End time: 12:05AM.

## 2020-02-14 NOTE — ASSESSMENT & PLAN NOTE
-Multifactorial, acute blood loss + iron deficiency.   -S/p 2 units PRBCs 2/17/2020, s/p IV iron 2/21/2020.   -Hgb stable at 7.8 today.   -Recheck CBC tomorrow.

## 2020-02-14 NOTE — PROGRESS NOTES
Received ED to inpatient transfer request from [facility]   Sending Physician: Adithya      Diagnosis GI Bleed   Patient Accepted by: Mark     Patient coming via: EMS ground  ETA: TBD   Nursing to notify Direct Admit On-Call hospitalist when patient arrives

## 2020-02-14 NOTE — ANESTHESIA QCDR
2019 [facility] Clinical Data Registry (for Quality Improvement)     Postoperative nausea/vomiting risk protocol (Adult = 18 yrs and Pediatric 3-17 yrs)- (430 and 463)  General inhalation anesthetic (NOT TIVA) with PONV risk factors: No  Provision of anti-emetic therapy with at least 2 different classes of agents: N/A  Patient DID NOT receive anti-emetic therapy and reason is documented in Medical Record: N/A    Multimodal Pain Management- (477)  Non-emergent surgery AND patient age >= 18: No  Use of Multimodal Pain Management, two or more drugs and/or interventions, NOT including systemic opioids:   Exception: Documented allergy to multiple classes of analgesics:     Smoking Abstinence (404)  Patient is current smoker (cigarette, pipe, e-cig, marijuanna): Yes  Elective Surgery: No  Abstinence instructions provided prior to day of surgery:   Patient abstained from smoking on day of surgery:     Pre-Op Beta-Blocker in Isolated CABG (44)  Isolated CABG AND patient age >= 18: No  Beta-blocker admin within 24 hours of surgical incision:   Exception:of medical reason(s) for not administering beta blocker within 24 hours prior to surgical incision (e.g., not  indicated,other medical reason):     PACU assessment of acute postoperative pain prior to Anesthesia Care End- Applies to Patients Age = 18- (ABG7)  Initial PACU pain score is which of the following: < 7/10  Patient unable to report pain score: N/A    Post-anesthetic transfer of care checklist/protocol to PACU/ICU- (426 and 427)  Upon conclusion of case, patient transferred to which of the following locations: PACU/Non-ICU  Use of transfer checklist/protocol: Yes  Exclusion: Service Performed in Patient Hospital Room (and thus did not require transfer): N/A  Unplanned admission to ICU related to anesthesia service up through end of PACU care- (MD51)  Unplanned admission to ICU (not initially anticipated at anesthesia start time): No

## 2020-02-14 NOTE — CARE PLAN
Problem: Nutritional:  Goal: Achieve adequate nutritional intake  Description: Diet > NPO and patient will consume >50% of meals.   Outcome: NOT MET

## 2020-02-14 NOTE — DIETARY
Nutrition Services:   Day 1 of admit. Consult received for BMI <19. Unable to interview pt since she is out of room due to procedure. Per chart review: pt's wt was 48.1 kg (106 lb) on 7/9/2019. 3.6 kg (8 lb) weight loss x 6 months noted, 8% decrease noted. This weight loss is not significant but noteworthy.     RD following.

## 2020-02-14 NOTE — CONSULTS
"Consults                                                Gastroenterology Consult Note     Date of Consult: 2/14/2020     Reason for consult: Anemia     HPI: Tameka is a pleasant 79-year-old woman previously known to our practice for history of gastric erosions, hiatal hernia, and esophagitis as noted on upper endoscopy performed at [facility] in November 2019.  Additionally she has a past medical history of a \"small hear attack\" several years ago which her  states is the reason she in on Plavix, as well as peripheral artery disease with angioplasty in December with Dr. Mohsin of vascular surgery.  She reports she has felt well and recently reported to [facility] for a staple removal for laceration on her head following a fall about a week ago.  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  Upon arrival to [facility] her hemoglobin was found to be 5.2 with hematocrit of 19.7, platelet count of 172.  Her BUN is elevated at 39.  She received 2 units of blood and her hgb recheck at 8.5, 7.7.  She also complained of right-sided flank pain since the fall and a CT scan showed \"chronic changes which are consistent with the prior CT scan\" per the accepting hospitalist note.   She denies any bloody stools, melena, acid reflux, nausea, vomiting, or abdominal pain.  She denies any NSAID use and uses Tylenol for as needed pain relief.  She does report she was recently diagnosed with oral thrush and was taking nystatin at home.  She denies and drug, nicotine use.  She drinks alcohol only on special occasions 1-2 drinks, less than once weekly.  Bowel movements are once or twice daily formed and comfortable.  She has never had a colonoscopy.  PMHX:  Past Medical History:   Diagnosis Date   • Peptic ulcer           PSurgHx:   Past Surgical " History:   Procedure Laterality Date   • APPENDECTOMY          ALLERGIES:Sulfa drugs and Tetracycline     SocHx:   Social History     Socioeconomic History   • Marital status: Unknown     Spouse name: Not on file   • Number of children: Not on file   • Years of education: Not on file   • Highest education level: Not on file   Occupational History   • Not on file   Social Needs   • Financial resource strain: Not on file   • Food insecurity     Worry: Not on file     Inability: Not on file   • Transportation needs     Medical: Not on file     Non-medical: Not on file   Tobacco Use   • Smoking status: Current Every Day Smoker     Packs/day: 1.00     Years: 42.00     Pack years: 42.00     Types: Cigarettes   • Smokeless tobacco: Never Used   Substance and Sexual Activity   • Alcohol use: No   • Drug use: No   • Sexual activity: Not on file   Lifestyle   • Physical activity     Days per week: Not on file     Minutes per session: Not on file   • Stress: Not on file   Relationships   • Social connections     Talks on phone: Not on file     Gets together: Not on file     Attends [employer] service: Not on file     Active member of club or organization: Not on file     Attends meetings of clubs or organizations: Not on file     Relationship status: Not on file   • Intimate partner violence     Fear of current or ex partner: Not on file     Emotionally abused: Not on file     Physically abused: Not on file     Forced sexual activity: Not on file   Other Topics Concern   • Not on file   Social History Narrative   • Not on file        FAMHx: No family history on file.     ROS:  Constitutional: No fevers, chills, no night sweats, no weight changes  HEENT: no vision or hearing changes, no dry mouth, no change in smell  CARDIO: no palpitations, no orthopnea, no chest pain  PULM: no cough, no shortness of breath  NEURO: no Seizures, no memory impairment, no change in sensation  GI: as above  : no dysuria, no hematuria  HEME: +  "anemia, + easy brusing  MUSCULOSKELETAL: no muscle aches, no back pain, no arthritis, +right flank/chest pain  PSYCH: no anxiety or depression  SKIN: no rashes     PE:  Vitals:    02/14/20 0236 02/14/20 0339 02/14/20 0516 02/14/20 0826   BP: 141/70 124/53  133/61   Pulse: 71 65 65 75   Resp:  17  16   Temp: 36.2 °C (97.2 °F) 35.9 °C (96.7 °F)  36.6 °C (97.8 °F)   TempSrc: Temporal Temporal  Temporal   SpO2: 94% 91%  90%   Weight: 44.7 kg (98 lb 8.7 oz)      Height: 1.575 m (5' 2\")        Gen: AAOx3, NAD, lying in bed  HEENT: PERRL, EOMI, nares patent, Mucous membranes moist, bruising around left eye  Neck: supple, no cervical or supraclavicular adenopathy  CVS: regular rhythm, normal rate, no MRG  Pulm: CTAB, no crackles  Abd: soft, Nd, NT, no guarding or rebound  Ext: no edema, normal sensation  NEURO: grossly normal, no weakness  Skin: warm, no rash  Psych: normal Affect, no anxiety     LABS:  Lab Results   Component Value Date/Time    SODIUM 140 02/14/2020 07:04 AM    POTASSIUM 3.5 (L) 02/14/2020 07:04 AM    CHLORIDE 111 02/14/2020 07:04 AM    CO2 20 02/14/2020 07:04 AM    GLUCOSE 78 02/14/2020 07:04 AM    BUN 39 (H) 02/14/2020 07:04 AM    CREATININE 0.81 02/14/2020 07:04 AM      Lab Results   Component Value Date/Time    WBC 5.1 02/14/2020 07:04 AM    RBC 3.23 (L) 02/14/2020 07:04 AM    HEMOGLOBIN 7.7 (L) 02/14/2020 07:04 AM    HEMATOCRIT 25.4 (L) 02/14/2020 07:04 AM    MCV 78.6 (L) 02/14/2020 07:04 AM    MCH 23.8 (L) 02/14/2020 07:04 AM    MCHC 30.3 (L) 02/14/2020 07:04 AM    MPV 10.4 02/14/2020 07:04 AM    NEUTSPOLYS 71.40 07/09/2019 04:08 PM    LYMPHOCYTES 15.80 (L) 07/09/2019 04:08 PM    MONOCYTES 10.70 07/09/2019 04:08 PM    EOSINOPHILS 0.90 07/09/2019 04:08 PM    BASOPHILS 0.90 07/09/2019 04:08 PM        No results found for: PROTHROMBTM, INR         IMAGING: Reviewed personally and summarized in HPI         ASSESSMENT:   Patient is an 79-year-old female with a previous history of upper GI bleed on chronic " anticoagulation and ASA who presented to the emergency room with a profound anemia and elevated BUN suspicious for recurrent upper GI bleed.        PROBLEMS:  Anemia  Peripheral Artery Disease  Falls     PLAN:   Patient has been placed on a PPI drip which is appropriate.  Continue PPI drip and maintain n.p.o. status.  We will plan for upper endoscopy later this afternoon.  Pending the results and her hemodynamic stability she may ultimately need a colonoscopy and video capsule endoscopy.  Continue to trend hemoglobin and transfuse PRN to keep greater than 7.  Hold anticoagulation at this time.  Further recommendations to follow.      Dr. Jc Rolon is aware of this consult and exam findings and his recommendations have been implemented in this plan of care.            Thank you for this consult.

## 2020-02-15 ENCOUNTER — APPOINTMENT (OUTPATIENT)
Dept: RADIOLOGY | Facility: MEDICAL CENTER | Age: 80
DRG: 329 | End: 2020-02-15
Attending: HOSPITALIST
Payer: MEDICARE

## 2020-02-15 PROBLEM — J43.9 PULMONARY EMPHYSEMA (HCC): Status: ACTIVE | Noted: 2020-02-14

## 2020-02-15 PROBLEM — E87.6 HYPOKALEMIA: Status: ACTIVE | Noted: 2020-02-15

## 2020-02-15 PROBLEM — J81.0 ACUTE PULMONARY EDEMA (HCC): Status: ACTIVE | Noted: 2020-02-15

## 2020-02-15 LAB
ANION GAP SERPL CALC-SCNC: 10 MMOL/L (ref 0–11.9)
BUN SERPL-MCNC: 23 MG/DL (ref 8–22)
CALCIUM SERPL-MCNC: 8.3 MG/DL (ref 8.5–10.5)
CHLORIDE SERPL-SCNC: 109 MMOL/L (ref 96–112)
CO2 SERPL-SCNC: 21 MMOL/L (ref 20–33)
CREAT SERPL-MCNC: 0.68 MG/DL (ref 0.5–1.4)
GLUCOSE SERPL-MCNC: 105 MG/DL (ref 65–99)
HGB BLD-MCNC: 8.7 G/DL (ref 12–16)
HGB BLD-MCNC: 8.9 G/DL (ref 12–16)
HGB BLD-MCNC: 9.1 G/DL (ref 12–16)
NT-PROBNP SERPL IA-MCNC: 2426 PG/ML (ref 0–125)
POTASSIUM SERPL-SCNC: 3.4 MMOL/L (ref 3.6–5.5)
SODIUM SERPL-SCNC: 140 MMOL/L (ref 135–145)

## 2020-02-15 PROCEDURE — 770020 HCHG ROOM/CARE - TELE (206)

## 2020-02-15 PROCEDURE — 700111 HCHG RX REV CODE 636 W/ 250 OVERRIDE (IP): Performed by: HOSPITALIST

## 2020-02-15 PROCEDURE — 85018 HEMOGLOBIN: CPT

## 2020-02-15 PROCEDURE — 700102 HCHG RX REV CODE 250 W/ 637 OVERRIDE(OP): Performed by: HOSPITALIST

## 2020-02-15 PROCEDURE — 80048 BASIC METABOLIC PNL TOTAL CA: CPT

## 2020-02-15 PROCEDURE — A9270 NON-COVERED ITEM OR SERVICE: HCPCS | Performed by: HOSPITALIST

## 2020-02-15 PROCEDURE — C9113 INJ PANTOPRAZOLE SODIUM, VIA: HCPCS | Performed by: HOSPITALIST

## 2020-02-15 PROCEDURE — 99233 SBSQ HOSP IP/OBS HIGH 50: CPT | Performed by: HOSPITALIST

## 2020-02-15 PROCEDURE — 83880 ASSAY OF NATRIURETIC PEPTIDE: CPT

## 2020-02-15 PROCEDURE — 700105 HCHG RX REV CODE 258: Performed by: HOSPITALIST

## 2020-02-15 PROCEDURE — 36415 COLL VENOUS BLD VENIPUNCTURE: CPT

## 2020-02-15 PROCEDURE — 71046 X-RAY EXAM CHEST 2 VIEWS: CPT

## 2020-02-15 RX ORDER — FUROSEMIDE 10 MG/ML
40 INJECTION INTRAMUSCULAR; INTRAVENOUS ONCE
Status: COMPLETED | OUTPATIENT
Start: 2020-02-15 | End: 2020-02-15

## 2020-02-15 RX ORDER — POTASSIUM CHLORIDE 20 MEQ/1
40 TABLET, EXTENDED RELEASE ORAL ONCE
Status: COMPLETED | OUTPATIENT
Start: 2020-02-15 | End: 2020-02-15

## 2020-02-15 RX ORDER — OMEPRAZOLE 20 MG/1
20 CAPSULE, DELAYED RELEASE ORAL DAILY
Status: DISCONTINUED | OUTPATIENT
Start: 2020-02-15 | End: 2020-02-24 | Stop reason: HOSPADM

## 2020-02-15 RX ADMIN — GABAPENTIN 100 MG: 100 CAPSULE ORAL at 17:21

## 2020-02-15 RX ADMIN — SENNOSIDES AND DOCUSATE SODIUM 2 TABLET: 8.6; 5 TABLET ORAL at 05:14

## 2020-02-15 RX ADMIN — ATORVASTATIN CALCIUM 80 MG: 80 TABLET, FILM COATED ORAL at 05:19

## 2020-02-15 RX ADMIN — FUROSEMIDE 40 MG: 10 INJECTION, SOLUTION INTRAMUSCULAR; INTRAVENOUS at 14:44

## 2020-02-15 RX ADMIN — SODIUM CHLORIDE, POTASSIUM CHLORIDE, SODIUM LACTATE AND CALCIUM CHLORIDE: 600; 310; 30; 20 INJECTION, SOLUTION INTRAVENOUS at 05:16

## 2020-02-15 RX ADMIN — SODIUM CHLORIDE 8 MG/HR: 9 INJECTION, SOLUTION INTRAVENOUS at 05:16

## 2020-02-15 RX ADMIN — LISINOPRIL 5 MG: 5 TABLET ORAL at 05:14

## 2020-02-15 RX ADMIN — BUSPIRONE HYDROCHLORIDE 5 MG: 10 TABLET ORAL at 05:14

## 2020-02-15 RX ADMIN — OMEPRAZOLE 20 MG: 20 CAPSULE, DELAYED RELEASE ORAL at 14:43

## 2020-02-15 RX ADMIN — GABAPENTIN 100 MG: 100 CAPSULE ORAL at 05:13

## 2020-02-15 RX ADMIN — POTASSIUM CHLORIDE 40 MEQ: 1500 TABLET, EXTENDED RELEASE ORAL at 14:43

## 2020-02-15 RX ADMIN — METOPROLOL SUCCINATE 25 MG: 25 TABLET, EXTENDED RELEASE ORAL at 05:14

## 2020-02-15 RX ADMIN — BUSPIRONE HYDROCHLORIDE 5 MG: 10 TABLET ORAL at 13:24

## 2020-02-15 RX ADMIN — SENNOSIDES AND DOCUSATE SODIUM 2 TABLET: 8.6; 5 TABLET ORAL at 17:21

## 2020-02-15 RX ADMIN — GABAPENTIN 100 MG: 100 CAPSULE ORAL at 13:24

## 2020-02-15 RX ADMIN — BUSPIRONE HYDROCHLORIDE 5 MG: 10 TABLET ORAL at 20:44

## 2020-02-15 ASSESSMENT — ENCOUNTER SYMPTOMS
DEPRESSION: 0
CONSTITUTIONAL NEGATIVE: 1
NECK PAIN: 0
NEUROLOGICAL NEGATIVE: 1
ABDOMINAL PAIN: 0
DOUBLE VISION: 0
COUGH: 1
BLURRED VISION: 0
PALPITATIONS: 0
DIZZINESS: 0
SPUTUM PRODUCTION: 1
CLAUDICATION: 0
VOMITING: 0
NAUSEA: 0
PSYCHIATRIC NEGATIVE: 1
SHORTNESS OF BREATH: 1
FEVER: 0
BACK PAIN: 0
HALLUCINATIONS: 0
TREMORS: 0
EYE PAIN: 0
TINGLING: 0
HEADACHES: 0
MYALGIAS: 0
CARDIOVASCULAR NEGATIVE: 1
RESPIRATORY NEGATIVE: 1
PHOTOPHOBIA: 0
HEARTBURN: 0

## 2020-02-15 NOTE — ASSESSMENT & PLAN NOTE
-Stable today. On room air at time of exam.   -Repeat cxr done 2/21/2020 showed a stable small/mod left and small right pleural effusions. Repeat xray tomorrow to monitor progress.   -Echo largely WNL. EF normal.   -BP's stabilized, continue furosemide. Monitor response.

## 2020-02-15 NOTE — PROGRESS NOTES
"[facility] Medicine Daily Progress Note    Date of Service  2/15/2020    Chief Complaint  79 y.o. female admitted 2/13/2020 with severe anemia      Interval Problem Update  patient required blood transfusion    No melena    Hb low at  8.7    Low potassium 3.4    Afebrile    Normal egd    Colonoscopy tomorrow- bowel prep in process    Patient frequently comments on her \" oral candidiasis\". I see no acute evidence of this and recommend stopping nystatin    Pt c/o congestion    I reviwed xray--> pulmonary edema    Consultants/Specialty  Dr wu gi    Code Status  full    Disposition  home    Review of Systems  Review of Systems   Constitutional: Positive for malaise/fatigue. Negative for fever.   HENT: Negative for ear pain, hearing loss and tinnitus.    Eyes: Negative for blurred vision, double vision, photophobia and pain.   Respiratory: Positive for cough, sputum production and shortness of breath.    Cardiovascular: Negative for chest pain, palpitations and claudication.   Gastrointestinal: Negative for abdominal pain, heartburn, nausea and vomiting.   Genitourinary: Negative for dysuria, frequency and urgency.   Musculoskeletal: Negative for back pain, myalgias and neck pain.   Neurological: Negative for dizziness, tingling, tremors and headaches.   Psychiatric/Behavioral: Negative for depression and hallucinations.   All other systems reviewed and are negative.       Physical Exam  Temp:  [36.1 °C (96.9 °F)-37.1 °C (98.8 °F)] 36.1 °C (96.9 °F)  Pulse:  [63-78] 70  Resp:  [16-24] 16  BP: (110-172)/(54-85) 164/85  SpO2:  [92 %-99 %] 92 %    Physical Exam  Constitutional:       General: She is not in acute distress.     Appearance: Normal appearance. She is normal weight. She is ill-appearing.   HENT:      Head: Normocephalic and atraumatic.      Nose: Congestion present.   Eyes:      General: No scleral icterus.     Extraocular Movements: Extraocular movements intact.      Pupils: Pupils are equal, round, and " reactive to light.   Neck:      Musculoskeletal: Normal range of motion and neck supple.   Cardiovascular:      Rate and Rhythm: Normal rate and regular rhythm.      Pulses: Normal pulses.      Heart sounds: Normal heart sounds.   Pulmonary:      Effort: Pulmonary effort is normal.      Breath sounds: Rales present.   Abdominal:      General: Abdomen is flat. Bowel sounds are normal. There is no distension.      Palpations: There is no mass.      Tenderness: There is no abdominal tenderness. There is no guarding or rebound.      Hernia: No hernia is present.   Musculoskeletal: Normal range of motion.         General: No swelling, tenderness, deformity or signs of injury.      Right lower leg: No edema.      Left lower leg: No edema.   Skin:     General: Skin is warm.      Capillary Refill: Capillary refill takes 2 to 3 seconds.      Coloration: Skin is pale. Skin is not jaundiced.      Findings: No bruising, erythema or rash.   Neurological:      General: No focal deficit present.      Mental Status: She is alert.      Motor: Weakness present.   Psychiatric:         Mood and Affect: Mood normal.         Fluids    Intake/Output Summary (Last 24 hours) at 2/15/2020 1341  Last data filed at 2/14/2020 1537  Gross per 24 hour   Intake 380 ml   Output --   Net 380 ml       Laboratory  Recent Labs     02/14/20  0704 02/14/20  1626 02/14/20  2305 02/15/20  0733   WBC 5.1  --   --   --    RBC 3.23*  --   --   --    HEMOGLOBIN 7.7* 8.3* 8.4* 8.7*   HEMATOCRIT 25.4*  --   --   --    MCV 78.6*  --   --   --    MCH 23.8*  --   --   --    MCHC 30.3*  --   --   --    RDW 62.2*  --   --   --    PLATELETCT 135*  --   --   --    MPV 10.4  --   --   --      Recent Labs     02/14/20  0704 02/15/20  0733   SODIUM 140 140   POTASSIUM 3.5* 3.4*   CHLORIDE 111 109   CO2 20 21   GLUCOSE 78 105*   BUN 39* 23*   CREATININE 0.81 0.68   CALCIUM 8.5 8.3*                   Imaging  DX-CHEST-2 VIEWS   Final Result      1.  Bilateral interstitial  opacities consistent with pulmonary edema. Small bilateral pleural effusions.   2.  Mild cardiac silhouette enlargement.   3.  Emphysematous changes.      EC-ECHOCARDIOGRAM COMPLETE W/O CONT    (Results Pending)        Assessment/Plan  * GIB (gastrointestinal bleeding)- (present on admission)  Assessment & Plan  Patient with a prior history of previous GI bleed last fall.  Records from outside facility indicates that an occult stool was done in the emergency room and was positive.  She currently is now status post 2 units packed red blood cell transfusion     Colonoscopy tomorrow    Iv PPI to po on 2/15    Anemia requiring transfusions- (present on admission)  Assessment & Plan  Now status post 2 units, treatment as noted above.    Hypokalemia  Assessment & Plan  Replace po    Acute pulmonary edema (HCC)  Assessment & Plan  ivf rate decreased    Lasix 40mg x 1 orderd on 2/15    Check echo    Pulmonary emphysema (HCC)- (present on admission)  Assessment & Plan  Not in acute exacerbation    Oxygen    Bronchodilators prn    HTN (hypertension)- (present on admission)  Assessment & Plan  continue home metoprolol and Prinivil     .    PAD (peripheral artery disease) (HCC)- (present on admission)  Assessment & Plan  Patient states that she underwent angioplasty in December with Dr. Mohsin of vascular surgery.  She has been taking aspirin and Plavix in addition to her Lipitor.  Holding both aspirin and Plavix for now, okay to continue home Lipitor.       VTE prophylaxis: scd

## 2020-02-15 NOTE — PROGRESS NOTES
Gastroenterology Progress Note     Author: Jc Rolon M.D.   Date & Time Created: 2/15/2020 12:39 PM    Chief Complaint:  Anemia    Interval History:  Drank 1/2 of bowel prep. Stool output is like mud per RNUriel.   No signs of GIB.     Review of Systems:  Review of Systems   Constitutional: Negative.    HENT: Negative.    Respiratory: Negative.    Cardiovascular: Negative.    Skin: Negative.    Neurological: Negative.    Psychiatric/Behavioral: Negative.        Physical Exam:  Physical Exam  Constitutional:       General: She is not in acute distress.     Appearance: She is not ill-appearing.   HENT:      Nose: Nose normal.      Mouth/Throat:      Mouth: Mucous membranes are moist.   Eyes:      Pupils: Pupils are equal, round, and reactive to light.   Neck:      Musculoskeletal: Normal range of motion.   Cardiovascular:      Rate and Rhythm: Normal rate.   Pulmonary:      Effort: Pulmonary effort is normal.   Abdominal:      General: Abdomen is flat.      Palpations: Abdomen is soft.   Musculoskeletal: Normal range of motion.   Neurological:      Mental Status: She is alert.   Psychiatric:         Mood and Affect: Mood normal.         Behavior: Behavior normal.         Thought Content: Thought content normal.         Judgment: Judgment normal.         Labs:          Recent Labs     20  0704 02/15/20  0733   SODIUM 140 140   POTASSIUM 3.5* 3.4*   CHLORIDE 111 109   CO2 20 21   BUN 39* 23*   CREATININE 0.81 0.68   CALCIUM 8.5 8.3*     Recent Labs     20  0704 02/15/20  0733   GLUCOSE 78 105*     Recent Labs     20  0704 20  1626 20  2305 02/15/20  0733   RBC 3.23*  --   --   --    HEMOGLOBIN 7.7* 8.3* 8.4* 8.7*   HEMATOCRIT 25.4*  --   --   --    PLATELETCT 135*  --   --   --      Recent Labs     20  0704   WBC 5.1     Hemodynamics:  Temp (24hrs), Av.8 °C (98.2 °F), Min:36.1 °C (96.9 °F), Max:37.1 °C (98.8 °F)  Temperature: 36.1 °C (96.9 °F)  Pulse  Av.4  Min: 16   Max: 78   Blood Pressure : 142/65     Respiratory:    Respiration: 16, Pulse Oximetry: 92 %     Work Of Breathing / Effort: Moderate  RUL Breath Sounds: Clear, RML Breath Sounds: Clear, RLL Breath Sounds: Clear, ANABEL Breath Sounds: Clear, LLL Breath Sounds: Clear  Fluids:    Intake/Output Summary (Last 24 hours) at 2/15/2020 1239  Last data filed at 2/14/2020 1537  Gross per 24 hour   Intake 380 ml   Output --   Net 380 ml     Weight: 44.1 kg (97 lb 3.6 oz)  GI/Nutrition:  Orders Placed This Encounter   Procedures   • Diet NPO     Standing Status:   Standing     Number of Occurrences:   8     Order Specific Question:   Type:     Answer:   At Midnight [5]     Order Specific Question:   Restrict to:     Answer:   Sips with Medications [3]     Medical Decision Making, by Problem:  Active Hospital Problems    Diagnosis   • *GIB (gastrointestinal bleeding) [K92.2]   • Anemia requiring transfusions [D64.9]   • PAD (peripheral artery disease) ([facility]) [I73.9]   • HTN (hypertension) [I10]   • Chronic obstructive pulmonary disease with acute exacerbation (HCC) [J44.1]       Plan:  GIB: Normal EGD 2/14/20. Incomplete bowel preparation. Will reschedule colonoscopy tomorrow.   Discussed this with patient, , and RN. Patient agrees. Orders present in EMR.     Quality-Core Measures

## 2020-02-15 NOTE — ASSESSMENT & PLAN NOTE
-Normal this morning after replacement was given yesterday. Recheck tomorrow. May need daily supplementation given that we have restarted IV furosemide but will see where she is at tomorrow a.m.

## 2020-02-15 NOTE — PROGRESS NOTES
Patient back from PACU at this time, in room 808-2,  at bedside. Patient is awake and alert, back on telemetry

## 2020-02-15 NOTE — ASSESSMENT & PLAN NOTE
-Wean off O2 as tolerated.   -Continue bronchodilators prn.   -Continue to encourage mobility and incentive spirometry.    -PEP therapy added 2/19/2020.

## 2020-02-15 NOTE — PROCEDURES
DATE OF SERVICE:  02/14/2020    PROCEDURE PERFORMED:  Esophagogastroduodenoscopy.    PREPROCEDURE DIAGNOSIS:  Anemia.    POSTPROCEDURE DIAGNOSIS:  Normal esophagogastroduodenoscopy.    CONSENT:  Procedure risks and benefits reviewed thoroughly with the patient,   risks including but not limited to bleeding, perforation, side effects of   medication were informed.  Patient voiced understanding and agreed to proceed.    PHYSICIAN:  Jc Rolon MD    ANESTHESIOLOGIST:  Quinn Osman MD    MEDICATIONS:  Deep sedation.    DESCRIPTION OF PROCEDURE:  The patient was placed in a left lateral decubitus   position after sedation was achieved.  Bite block was inserted in mouth and a   forward-viewing gastroscope was passed carefully and easily under direct   visualization into the esophagus.  All esophageal views were normal.    Retroflex view of stomach were normal.  Forward view of stomach were normal.    Forward views of the duodenum, duodenal bulb, duodenal sweep, second, and   third portion of the duodenum were all normal.  No evidence for any bleeding.    No evidence for new or old blood, essentially normal EGD.  Stomach was   suctioned, desufflated, and scope was removed.    COMPLICATIONS:  None.    BLOOD LOSS:  None.    SPECIMENS:  None.    RECOMMENDATIONS:  Patient has a normal EGD.  She has never had a colonoscopy.    Recommendations for inpatient versus outpatient colonoscopy reviewed with the   patient.  Patient opted for inpatient evaluation.  Patient will be placed on   clear liquids, n.p.o. after midnight.  Prior to n.p.o. after midnight, the   patient will have a bowel prep 4 liters of GoLYTELY and then colonoscopy   tomorrow.  The above was reviewed with the patient.  The above was reviewed   with the patient's .  They were informed the importance of adequate   bowel preparation in preparation for a complete colonoscopy.  They voiced   understanding and the necessity of a complete preparation  and agreed to comply   with this.  We will schedule for tomorrow.       ____________________________________     Jc MD FLOWER Yanez / SHRUTHI    DD:  02/14/2020 15:38:45  DT:  02/14/2020 17:58:35    D#:  [id number]  Job#:  [id number]

## 2020-02-15 NOTE — RESPIRATORY CARE
COPD EDUCATION by COPD CLINICAL EDUCATOR  2/15/2020 at 8:09 AM by Zee Noyola, RRT     Patient reviewed by COPD education team. Patient does not have a history or diagnosis of COPD and does not use home pulmonary medications, therefore does not qualify for the COPD program.

## 2020-02-16 ENCOUNTER — ANESTHESIA EVENT (OUTPATIENT)
Dept: SURGERY | Facility: MEDICAL CENTER | Age: 80
DRG: 329 | End: 2020-02-16
Payer: MEDICARE

## 2020-02-16 ENCOUNTER — APPOINTMENT (OUTPATIENT)
Dept: CARDIOLOGY | Facility: MEDICAL CENTER | Age: 80
DRG: 329 | End: 2020-02-16
Attending: HOSPITALIST
Payer: MEDICARE

## 2020-02-16 ENCOUNTER — ANESTHESIA (OUTPATIENT)
Dept: SURGERY | Facility: MEDICAL CENTER | Age: 80
DRG: 329 | End: 2020-02-16
Payer: MEDICARE

## 2020-02-16 PROBLEM — K63.89 COLONIC MASS: Status: ACTIVE | Noted: 2020-02-16

## 2020-02-16 LAB
ANION GAP SERPL CALC-SCNC: 9 MMOL/L (ref 0–11.9)
BUN SERPL-MCNC: 13 MG/DL (ref 8–22)
CALCIUM SERPL-MCNC: 8.2 MG/DL (ref 8.5–10.5)
CHLORIDE SERPL-SCNC: 106 MMOL/L (ref 96–112)
CO2 SERPL-SCNC: 25 MMOL/L (ref 20–33)
CREAT SERPL-MCNC: 0.55 MG/DL (ref 0.5–1.4)
GLUCOSE SERPL-MCNC: 100 MG/DL (ref 65–99)
LV EJECT FRACT  99904: 65
LV EJECT FRACT MOD 2C 99903: 64.75
LV EJECT FRACT MOD 4C 99902: 51.23
LV EJECT FRACT MOD BP 99901: 59.47
NT-PROBNP SERPL IA-MCNC: 2352 PG/ML (ref 0–125)
POTASSIUM SERPL-SCNC: 3.2 MMOL/L (ref 3.6–5.5)
SODIUM SERPL-SCNC: 140 MMOL/L (ref 135–145)

## 2020-02-16 PROCEDURE — 160208 HCHG ENDO MINUTES - EA ADDL 1 MIN LEVEL 4: Performed by: INTERNAL MEDICINE

## 2020-02-16 PROCEDURE — 770020 HCHG ROOM/CARE - TELE (206)

## 2020-02-16 PROCEDURE — 160203 HCHG ENDO MINUTES - 1ST 30 MINS LEVEL 4: Performed by: INTERNAL MEDICINE

## 2020-02-16 PROCEDURE — 160002 HCHG RECOVERY MINUTES (STAT): Performed by: INTERNAL MEDICINE

## 2020-02-16 PROCEDURE — 99233 SBSQ HOSP IP/OBS HIGH 50: CPT | Performed by: HOSPITALIST

## 2020-02-16 PROCEDURE — 0DBN8ZX EXCISION OF SIGMOID COLON, VIA NATURAL OR ARTIFICIAL OPENING ENDOSCOPIC, DIAGNOSTIC: ICD-10-PCS | Performed by: INTERNAL MEDICINE

## 2020-02-16 PROCEDURE — 88305 TISSUE EXAM BY PATHOLOGIST: CPT

## 2020-02-16 PROCEDURE — A9270 NON-COVERED ITEM OR SERVICE: HCPCS | Performed by: HOSPITALIST

## 2020-02-16 PROCEDURE — 501629 HCHG TUBE, LUKI TRAP STERILE DISP: Performed by: INTERNAL MEDICINE

## 2020-02-16 PROCEDURE — 700111 HCHG RX REV CODE 636 W/ 250 OVERRIDE (IP): Performed by: HOSPITALIST

## 2020-02-16 PROCEDURE — 93306 TTE W/DOPPLER COMPLETE: CPT | Mod: 26 | Performed by: INTERNAL MEDICINE

## 2020-02-16 PROCEDURE — 503081 HCHG INK, SPOT LF (ENDO): Performed by: INTERNAL MEDICINE

## 2020-02-16 PROCEDURE — 700111 HCHG RX REV CODE 636 W/ 250 OVERRIDE (IP): Performed by: ANESTHESIOLOGY

## 2020-02-16 PROCEDURE — 93306 TTE W/DOPPLER COMPLETE: CPT

## 2020-02-16 PROCEDURE — 80048 BASIC METABOLIC PNL TOTAL CA: CPT

## 2020-02-16 PROCEDURE — 160048 HCHG OR STATISTICAL LEVEL 1-5: Performed by: INTERNAL MEDICINE

## 2020-02-16 PROCEDURE — 700102 HCHG RX REV CODE 250 W/ 637 OVERRIDE(OP): Performed by: HOSPITALIST

## 2020-02-16 PROCEDURE — 0DBF8ZX EXCISION OF RIGHT LARGE INTESTINE, VIA NATURAL OR ARTIFICIAL OPENING ENDOSCOPIC, DIAGNOSTIC: ICD-10-PCS | Performed by: INTERNAL MEDICINE

## 2020-02-16 PROCEDURE — 83880 ASSAY OF NATRIURETIC PEPTIDE: CPT

## 2020-02-16 PROCEDURE — 160009 HCHG ANES TIME/MIN: Performed by: INTERNAL MEDICINE

## 2020-02-16 PROCEDURE — 36415 COLL VENOUS BLD VENIPUNCTURE: CPT

## 2020-02-16 PROCEDURE — 160035 HCHG PACU - 1ST 60 MINS PHASE I: Performed by: INTERNAL MEDICINE

## 2020-02-16 RX ORDER — OXYCODONE HCL 5 MG/5 ML
10 SOLUTION, ORAL ORAL
Status: DISCONTINUED | OUTPATIENT
Start: 2020-02-16 | End: 2020-02-16 | Stop reason: HOSPADM

## 2020-02-16 RX ORDER — DIPHENHYDRAMINE HYDROCHLORIDE 50 MG/ML
12.5 INJECTION INTRAMUSCULAR; INTRAVENOUS
Status: DISCONTINUED | OUTPATIENT
Start: 2020-02-16 | End: 2020-02-16 | Stop reason: HOSPADM

## 2020-02-16 RX ORDER — OXYCODONE HCL 5 MG/5 ML
5 SOLUTION, ORAL ORAL
Status: DISCONTINUED | OUTPATIENT
Start: 2020-02-16 | End: 2020-02-16 | Stop reason: HOSPADM

## 2020-02-16 RX ORDER — HYDROMORPHONE HYDROCHLORIDE 1 MG/ML
0.2 INJECTION, SOLUTION INTRAMUSCULAR; INTRAVENOUS; SUBCUTANEOUS
Status: DISCONTINUED | OUTPATIENT
Start: 2020-02-16 | End: 2020-02-16 | Stop reason: HOSPADM

## 2020-02-16 RX ORDER — POTASSIUM CHLORIDE 20 MEQ/1
40 TABLET, EXTENDED RELEASE ORAL ONCE
Status: COMPLETED | OUTPATIENT
Start: 2020-02-16 | End: 2020-02-16

## 2020-02-16 RX ORDER — METOPROLOL TARTRATE 1 MG/ML
1 INJECTION, SOLUTION INTRAVENOUS
Status: DISCONTINUED | OUTPATIENT
Start: 2020-02-16 | End: 2020-02-16 | Stop reason: HOSPADM

## 2020-02-16 RX ORDER — HYDROMORPHONE HYDROCHLORIDE 1 MG/ML
0.1 INJECTION, SOLUTION INTRAMUSCULAR; INTRAVENOUS; SUBCUTANEOUS
Status: DISCONTINUED | OUTPATIENT
Start: 2020-02-16 | End: 2020-02-16 | Stop reason: HOSPADM

## 2020-02-16 RX ORDER — MEPERIDINE HYDROCHLORIDE 25 MG/ML
6.25 INJECTION INTRAMUSCULAR; INTRAVENOUS; SUBCUTANEOUS
Status: DISCONTINUED | OUTPATIENT
Start: 2020-02-16 | End: 2020-02-16 | Stop reason: HOSPADM

## 2020-02-16 RX ORDER — ONDANSETRON 2 MG/ML
4 INJECTION INTRAMUSCULAR; INTRAVENOUS
Status: DISCONTINUED | OUTPATIENT
Start: 2020-02-16 | End: 2020-02-16 | Stop reason: HOSPADM

## 2020-02-16 RX ORDER — SODIUM CHLORIDE, SODIUM LACTATE, POTASSIUM CHLORIDE, CALCIUM CHLORIDE 600; 310; 30; 20 MG/100ML; MG/100ML; MG/100ML; MG/100ML
INJECTION, SOLUTION INTRAVENOUS CONTINUOUS
Status: DISCONTINUED | OUTPATIENT
Start: 2020-02-16 | End: 2020-02-16 | Stop reason: HOSPADM

## 2020-02-16 RX ORDER — FUROSEMIDE 10 MG/ML
40 INJECTION INTRAMUSCULAR; INTRAVENOUS ONCE
Status: COMPLETED | OUTPATIENT
Start: 2020-02-16 | End: 2020-02-16

## 2020-02-16 RX ORDER — HYDROMORPHONE HYDROCHLORIDE 1 MG/ML
0.4 INJECTION, SOLUTION INTRAMUSCULAR; INTRAVENOUS; SUBCUTANEOUS
Status: DISCONTINUED | OUTPATIENT
Start: 2020-02-16 | End: 2020-02-16 | Stop reason: HOSPADM

## 2020-02-16 RX ORDER — MIDAZOLAM HYDROCHLORIDE 1 MG/ML
1 INJECTION INTRAMUSCULAR; INTRAVENOUS
Status: DISCONTINUED | OUTPATIENT
Start: 2020-02-16 | End: 2020-02-16 | Stop reason: HOSPADM

## 2020-02-16 RX ORDER — HALOPERIDOL 5 MG/ML
1 INJECTION INTRAMUSCULAR
Status: DISCONTINUED | OUTPATIENT
Start: 2020-02-16 | End: 2020-02-16 | Stop reason: HOSPADM

## 2020-02-16 RX ORDER — HYDRALAZINE HYDROCHLORIDE 20 MG/ML
5 INJECTION INTRAMUSCULAR; INTRAVENOUS
Status: DISCONTINUED | OUTPATIENT
Start: 2020-02-16 | End: 2020-02-16 | Stop reason: HOSPADM

## 2020-02-16 RX ADMIN — FENTANYL CITRATE 25 MCG: 50 INJECTION, SOLUTION INTRAMUSCULAR; INTRAVENOUS at 12:36

## 2020-02-16 RX ADMIN — POTASSIUM CHLORIDE 40 MEQ: 1500 TABLET, EXTENDED RELEASE ORAL at 18:51

## 2020-02-16 RX ADMIN — METOPROLOL SUCCINATE 25 MG: 25 TABLET, EXTENDED RELEASE ORAL at 06:07

## 2020-02-16 RX ADMIN — PROPOFOL 20 MG: 10 INJECTION, EMULSION INTRAVENOUS at 12:45

## 2020-02-16 RX ADMIN — BUSPIRONE HYDROCHLORIDE 5 MG: 10 TABLET ORAL at 21:04

## 2020-02-16 RX ADMIN — BUSPIRONE HYDROCHLORIDE 5 MG: 10 TABLET ORAL at 06:07

## 2020-02-16 RX ADMIN — GABAPENTIN 100 MG: 100 CAPSULE ORAL at 18:51

## 2020-02-16 RX ADMIN — GABAPENTIN 100 MG: 100 CAPSULE ORAL at 06:07

## 2020-02-16 RX ADMIN — LISINOPRIL 5 MG: 5 TABLET ORAL at 06:07

## 2020-02-16 RX ADMIN — ATORVASTATIN CALCIUM 80 MG: 80 TABLET, FILM COATED ORAL at 06:09

## 2020-02-16 RX ADMIN — ACETAMINOPHEN 650 MG: 325 TABLET, FILM COATED ORAL at 02:00

## 2020-02-16 RX ADMIN — OMEPRAZOLE 20 MG: 20 CAPSULE, DELAYED RELEASE ORAL at 06:07

## 2020-02-16 RX ADMIN — SENNOSIDES AND DOCUSATE SODIUM 2 TABLET: 8.6; 5 TABLET ORAL at 06:07

## 2020-02-16 RX ADMIN — PROPOFOL 10 MG: 10 INJECTION, EMULSION INTRAVENOUS at 12:56

## 2020-02-16 RX ADMIN — PROPOFOL 100 MCG/KG/MIN: 10 INJECTION, EMULSION INTRAVENOUS at 12:36

## 2020-02-16 RX ADMIN — FUROSEMIDE 40 MG: 10 INJECTION, SOLUTION INTRAMUSCULAR; INTRAVENOUS at 18:51

## 2020-02-16 RX ADMIN — PROPOFOL 20 MG: 10 INJECTION, EMULSION INTRAVENOUS at 12:36

## 2020-02-16 ASSESSMENT — ENCOUNTER SYMPTOMS
SHORTNESS OF BREATH: 1
EYE PAIN: 0
DOUBLE VISION: 0
CLAUDICATION: 0
TREMORS: 0
FEVER: 0
PALPITATIONS: 0
VOMITING: 0
NAUSEA: 0
PHOTOPHOBIA: 0
MYALGIAS: 0
HEARTBURN: 0
BLURRED VISION: 0
DIZZINESS: 0
COUGH: 1
TINGLING: 0
BACK PAIN: 0
ABDOMINAL PAIN: 0
SPUTUM PRODUCTION: 0
NECK PAIN: 0
DEPRESSION: 0
HALLUCINATIONS: 0
HEADACHES: 0

## 2020-02-16 ASSESSMENT — PAIN SCALES - GENERAL: PAIN_LEVEL: 0

## 2020-02-16 NOTE — PROGRESS NOTES
[facility] Medicine Daily Progress Note    Date of Service  2/16/2020    Chief Complaint  79 y.o. female admitted 2/13/2020 with severe anemia      Interval Problem Update    No melena    Hb low at  8.9    Low potassium 3.2    Afebrile    Normal egd    Colonoscopy- right colon mass    Elevated bnp 2352      Pt c/o dry mouth      Treating --> pulmonary edema    I consulted surgery dr aggarwal on 2/16    Consultants/Specialty  Dr wu gi    Surgery dr aggarwal    Code Status  full    Disposition  home    Review of Systems  Review of Systems   Constitutional: Positive for malaise/fatigue. Negative for fever.   HENT: Negative for ear pain, hearing loss and tinnitus.    Eyes: Negative for blurred vision, double vision, photophobia and pain.   Respiratory: Positive for cough and shortness of breath. Negative for sputum production.    Cardiovascular: Negative for chest pain, palpitations and claudication.   Gastrointestinal: Negative for abdominal pain, heartburn, nausea and vomiting.   Genitourinary: Negative for dysuria, frequency and urgency.   Musculoskeletal: Negative for back pain, myalgias and neck pain.   Neurological: Negative for dizziness, tingling, tremors and headaches.   Psychiatric/Behavioral: Negative for depression and hallucinations.   All other systems reviewed and are negative.       Physical Exam  Temp:  [36.1 °C (97 °F)-36.8 °C (98.2 °F)] 36.4 °C (97.6 °F)  Pulse:  [62-81] 77  Resp:  [16-26] 16  BP: ()/(38-78) 149/66  SpO2:  [92 %-100 %] 93 %    Physical Exam  Constitutional:       General: She is not in acute distress.     Appearance: Normal appearance. She is normal weight. She is ill-appearing.   HENT:      Head: Normocephalic and atraumatic.      Nose: No congestion.   Eyes:      General: No scleral icterus.     Extraocular Movements: Extraocular movements intact.      Pupils: Pupils are equal, round, and reactive to light.   Neck:      Musculoskeletal: Normal range of motion and neck supple.    Cardiovascular:      Rate and Rhythm: Normal rate and regular rhythm.      Pulses: Normal pulses.      Heart sounds: Normal heart sounds.   Pulmonary:      Effort: Pulmonary effort is normal.      Breath sounds: No rales.   Abdominal:      General: Abdomen is flat. Bowel sounds are normal. There is no distension.      Palpations: There is no mass.      Tenderness: There is no abdominal tenderness. There is no guarding or rebound.      Hernia: No hernia is present.   Musculoskeletal: Normal range of motion.         General: No swelling, tenderness, deformity or signs of injury.      Right lower leg: No edema.      Left lower leg: No edema.   Skin:     General: Skin is warm.      Capillary Refill: Capillary refill takes 2 to 3 seconds.      Coloration: Skin is pale. Skin is not jaundiced.      Findings: No bruising, erythema or rash.   Neurological:      General: No focal deficit present.      Mental Status: She is alert.      Motor: Weakness present.   Psychiatric:         Mood and Affect: Mood normal.         Fluids    Intake/Output Summary (Last 24 hours) at 2/16/2020 1516  Last data filed at 2/16/2020 1320  Gross per 24 hour   Intake 400 ml   Output --   Net 400 ml       Laboratory  Recent Labs     02/14/20  0704  02/15/20  0733 02/15/20  1454 02/15/20  2319   WBC 5.1  --   --   --   --    RBC 3.23*  --   --   --   --    HEMOGLOBIN 7.7*   < > 8.7* 9.1* 8.9*   HEMATOCRIT 25.4*  --   --   --   --    MCV 78.6*  --   --   --   --    MCH 23.8*  --   --   --   --    MCHC 30.3*  --   --   --   --    RDW 62.2*  --   --   --   --    PLATELETCT 135*  --   --   --   --    MPV 10.4  --   --   --   --     < > = values in this interval not displayed.     Recent Labs     02/14/20  0704 02/15/20  0733 02/16/20  0230   SODIUM 140 140 140   POTASSIUM 3.5* 3.4* 3.2*   CHLORIDE 111 109 106   CO2 20 21 25   GLUCOSE 78 105* 100*   BUN 39* 23* 13   CREATININE 0.81 0.68 0.55   CALCIUM 8.5 8.3* 8.2*                    Imaging  EC-ECHOCARDIOGRAM COMPLETE W/O CONT   Final Result      DX-CHEST-2 VIEWS   Final Result      1.  Bilateral interstitial opacities consistent with pulmonary edema. Small bilateral pleural effusions.   2.  Mild cardiac silhouette enlargement.   3.  Emphysematous changes.           Assessment/Plan  * GIB (gastrointestinal bleeding)- (present on admission)  Assessment & Plan  Patient with a prior history of previous GI bleed last fall.  Records from outside facility indicates that an occult stool was done in the emergency room and was positive.  She currently is now status post 2 units packed red blood cell transfusion     Colonoscopy --> right colon mass    Iv PPI to po on 2/15    Anemia requiring transfusions- (present on admission)  Assessment & Plan  Now status post 2 units, treatment as noted above.    Colonic mass- (present on admission)  Assessment & Plan  Surgery dr aggarwal consulted    Hypokalemia  Assessment & Plan  Replace po    Acute pulmonary edema (HCC)  Assessment & Plan  ivf stopped    Lasix 40mg x 1 orderd on 2/15 and 2/16  Echo-no acute findings    Pulmonary emphysema (HCC)- (present on admission)  Assessment & Plan  Not in acute exacerbation    Oxygen    Bronchodilators prn    HTN (hypertension)- (present on admission)  Assessment & Plan  continue home metoprolol and Prinivil     .    PAD (peripheral artery disease) (HCC)- (present on admission)  Assessment & Plan  Patient states that she underwent angioplasty in December with Dr. Mohsin of vascular surgery.  She has been taking aspirin and Plavix in addition to her Lipitor.  Holding both aspirin and Plavix for now, okay to continue home Lipitor.       VTE prophylaxis: scd      Check am cbc, bmp, bnp

## 2020-02-16 NOTE — PROGRESS NOTES
"Patient A+Ox4,  at bedside. Patient on telemetry, 2L 02, continues to complain of \"thick slime\" in her throat, clearing her throat frequently and spitting into tissues. Denies other discomfort. NPO for colonoscopy today, having clear watery stools on bedpan. Using call bell approp, bed locked and in lowest position, whiteboard updated, hourly rounding in place   "

## 2020-02-16 NOTE — PROGRESS NOTES
.Assumed care at 1900, bedside report received from day shift RN. Pt on the monitor. Initial assessment completed, orders reviewed, call light within reach, and hourly rounding in place. POC addressed with patient, no additional questions at this time.

## 2020-02-16 NOTE — OR NURSING
Transported to room w/ Bianca LOVE. PACU nurse on  Monitor and O2.    Brittany GOVEA notified of pt arrivial. Pt connected to monitor, call light within reach, O2 at 3l NC.  at bedside.

## 2020-02-16 NOTE — ANESTHESIA TIME REPORT
Anesthesia Start and Stop Event Times     Date Time Event    2/16/2020 1216 Ready for Procedure     1233 Anesthesia Start     1320 Anesthesia Stop        Responsible Staff  02/16/20    Name Role Begin End    Wiley Shine M.D. Anesth 1233 1320        Preop Diagnosis (Free Text):  Pre-op Diagnosis     anemia, bleed        Preop Diagnosis (Codes):    Post op Diagnosis  Mass of colon  right colon mass    Premium Reason  E. Weekend    Comments:

## 2020-02-16 NOTE — ANESTHESIA QCDR
2019 [facility] Clinical Data Registry (for Quality Improvement)     Postoperative nausea/vomiting risk protocol (Adult = 18 yrs and Pediatric 3-17 yrs)- (430 and 463)  General inhalation anesthetic (NOT TIVA) with PONV risk factors: No  Provision of anti-emetic therapy with at least 2 different classes of agents: N/A  Patient DID NOT receive anti-emetic therapy and reason is documented in Medical Record: N/A    Multimodal Pain Management- (477)  Non-emergent surgery AND patient age >= 18: Yes  Use of Multimodal Pain Management, two or more drugs and/or interventions, NOT including systemic opioids: No  Exception: Documented allergy to multiple classes of analgesics: No       Smoking Abstinence (404)  Patient is current smoker (cigarette, pipe, e-cig, marijuanna): No  Elective Surgery:   Abstinence instructions provided prior to day of surgery:   Patient abstained from smoking on day of surgery:     Pre-Op Beta-Blocker in Isolated CABG (44)  Isolated CABG AND patient age >= 18: No  Beta-blocker admin within 24 hours of surgical incision:   Exception:of medical reason(s) for not administering beta blocker within 24 hours prior to surgical incision (e.g., not  indicated,other medical reason):     PACU assessment of acute postoperative pain prior to Anesthesia Care End- Applies to Patients Age = 18- (ABG7)  Initial PACU pain score is which of the following: < 7/10  Patient unable to report pain score: N/A    Post-anesthetic transfer of care checklist/protocol to PACU/ICU- (426 and 427)  Upon conclusion of case, patient transferred to which of the following locations: PACU/Non-ICU  Use of transfer checklist/protocol: Yes  Exclusion: Service Performed in Patient Hospital Room (and thus did not require transfer): N/A  Unplanned admission to ICU related to anesthesia service up through end of PACU care- (MD51)  Unplanned admission to ICU (not initially anticipated at anesthesia start time): No

## 2020-02-16 NOTE — ASSESSMENT & PLAN NOTE
-S/p right hemicolectomy on 2/18/2020. Pathology showed benign adenoma.   -Advance diet per surgery. Tolerating GI soft diet.   -Surgical incisions uncomplicated.

## 2020-02-16 NOTE — OP REPORT
DATE OF SERVICE:  02/16/2020    PROCEDURES PERFORMED:  1.  Colonoscopy with cold snare resection of polyp from the sigmoid colon.  2.  Biopsy of right colon mass.  3.  Tattoo distal to right mass.    PHYSICIAN:  Jc Rolon MD    ANESTHESIOLOGIST:  Present.    MEDICATIONS:  Deep sedation.    CONSENT:  Procedure risks and benefits reviewed thoroughly with the patient.    Risks including bleeding, perforation, side effects of medication were   informed.  Patient voiced understanding and agreed to proceed.    PROCEDURE:  The patient was placed in a left lateral decubitus position.    After sedation was achieved, rectal examination revealed no palpable masses   and a colonoscope was inserted in the rectum and advanced to cecum in a very   tortuous and angulated and floppy colon.  Significant difficulty was   appreciated requiring patient positioning, 2 person with rehana abdominal   compression to allow for cecal intubation.  Appendiceal orifice and ileocecal   valve were well visualized and imaged.  Upon careful retraction, a mass on the   right side of the colon was appreciated.  It was biopsied.  It occupied 180   degrees of the circumference of the colon.  It appears to be an early stage   polyp with possible invasion.  Biopsies were obtained.  It was tattooed just   distal to it.  Otherwise, a sigmoid colon polyp was identified at 1 cm on a   stalk.  This was removed by cold snare polypectomy without complication and   sigmoid diverticulosis appreciated.  Retroflex of the rectum otherwise normal.    Rectum desufflated and scope was removed.    COMPLICATIONS:  None.    ESTIMATED BLOOD LOSS:  None.    SPECIMENS:  Obtained.    RECOMMENDATIONS:  Right colon mass will need to be addressed surgically.  We   will obtain a colorectal surgical consultation with further recommendations   per their evaluation and treatment recommendations.  The above will be   reviewed with the patient and the patient's family if they  are present in the   waiting area.       ____________________________________     Cj MD FLOWER Yanez / SHRUTHI    DD:  02/16/2020 13:12:56  DT:  02/16/2020 13:51:47    D#:  [id number]  Job#:  [id number]

## 2020-02-16 NOTE — PROGRESS NOTES
Patient back from PACU at this time,  at bedside. Patient updated on plan of care, await surgical consult. Denies pain, no nausea. Restarted on clear liquids at this time.  On telemetry. Bed alarm on

## 2020-02-16 NOTE — ANESTHESIA PREPROCEDURE EVALUATION
79 year old female with history of COPD, acute pulmonary edema and anemia. Echocardiogram reviewed - LVEF 65%, mild mitral regurgitation, normal right heart systolic function, dilated right atrium and IVC.     Relevant Problems   PULMONARY   (+) Pulmonary emphysema (HCC)      CARDIAC   (+) HTN (hypertension)   (+) PAD (peripheral artery disease) (HCC)      Other   (+) Acute pulmonary edema (HCC)       Physical Exam    Airway   Mallampati: II  TM distance: >3 FB  Neck ROM: full       Cardiovascular - normal exam  Rhythm: regular  Rate: normal  (-) murmur     Dental - normal exam      Very poor dentition   Pulmonary - normal exam  (+) rales     Abdominal    Neurological - normal exam               Anesthesia Plan    ASA 3 (acute pulmonary edema)   ASA physical status 3 criteria: COPD and other (comment)    Plan - MAC             Induction: intravenous      Pertinent diagnostic labs and testing reviewed    Informed Consent:    Anesthetic plan and risks discussed with patient.

## 2020-02-17 ENCOUNTER — APPOINTMENT (OUTPATIENT)
Dept: RADIOLOGY | Facility: MEDICAL CENTER | Age: 80
DRG: 329 | End: 2020-02-17
Attending: HOSPITALIST
Payer: MEDICARE

## 2020-02-17 LAB
ANION GAP SERPL CALC-SCNC: 6 MMOL/L (ref 0–11.9)
BUN SERPL-MCNC: 8 MG/DL (ref 8–22)
CALCIUM SERPL-MCNC: 8 MG/DL (ref 8.5–10.5)
CHLORIDE SERPL-SCNC: 107 MMOL/L (ref 96–112)
CO2 SERPL-SCNC: 28 MMOL/L (ref 20–33)
CREAT SERPL-MCNC: 0.54 MG/DL (ref 0.5–1.4)
ERYTHROCYTE [DISTWIDTH] IN BLOOD BY AUTOMATED COUNT: 66.7 FL (ref 35.9–50)
GLUCOSE SERPL-MCNC: 104 MG/DL (ref 65–99)
HCT VFR BLD AUTO: 25.4 % (ref 37–47)
HGB BLD-MCNC: 7.6 G/DL (ref 12–16)
MCH RBC QN AUTO: 24 PG (ref 27–33)
MCHC RBC AUTO-ENTMCNC: 29.9 G/DL (ref 33.6–35)
MCV RBC AUTO: 80.1 FL (ref 81.4–97.8)
NT-PROBNP SERPL IA-MCNC: 2011 PG/ML (ref 0–125)
PATHOLOGY CONSULT NOTE: NORMAL
PLATELET # BLD AUTO: 123 K/UL (ref 164–446)
PMV BLD AUTO: 11 FL (ref 9–12.9)
POTASSIUM SERPL-SCNC: 3.3 MMOL/L (ref 3.6–5.5)
PROCALCITONIN SERPL-MCNC: <0.05 NG/ML
RBC # BLD AUTO: 3.17 M/UL (ref 4.2–5.4)
SODIUM SERPL-SCNC: 141 MMOL/L (ref 135–145)
WBC # BLD AUTO: 7.1 K/UL (ref 4.8–10.8)

## 2020-02-17 PROCEDURE — 770020 HCHG ROOM/CARE - TELE (206)

## 2020-02-17 PROCEDURE — 85027 COMPLETE CBC AUTOMATED: CPT

## 2020-02-17 PROCEDURE — A9270 NON-COVERED ITEM OR SERVICE: HCPCS | Performed by: HOSPITALIST

## 2020-02-17 PROCEDURE — 700102 HCHG RX REV CODE 250 W/ 637 OVERRIDE(OP): Performed by: HOSPITALIST

## 2020-02-17 PROCEDURE — 700111 HCHG RX REV CODE 636 W/ 250 OVERRIDE (IP): Performed by: HOSPITALIST

## 2020-02-17 PROCEDURE — 36415 COLL VENOUS BLD VENIPUNCTURE: CPT

## 2020-02-17 PROCEDURE — 84145 PROCALCITONIN (PCT): CPT

## 2020-02-17 PROCEDURE — 99233 SBSQ HOSP IP/OBS HIGH 50: CPT | Performed by: HOSPITALIST

## 2020-02-17 PROCEDURE — 83880 ASSAY OF NATRIURETIC PEPTIDE: CPT

## 2020-02-17 PROCEDURE — 80048 BASIC METABOLIC PNL TOTAL CA: CPT

## 2020-02-17 PROCEDURE — 71045 X-RAY EXAM CHEST 1 VIEW: CPT

## 2020-02-17 RX ORDER — POTASSIUM CHLORIDE 20 MEQ/1
40 TABLET, EXTENDED RELEASE ORAL ONCE
Status: COMPLETED | OUTPATIENT
Start: 2020-02-17 | End: 2020-02-17

## 2020-02-17 RX ORDER — FUROSEMIDE 10 MG/ML
40 INJECTION INTRAMUSCULAR; INTRAVENOUS EVERY 8 HOURS
Status: COMPLETED | OUTPATIENT
Start: 2020-02-17 | End: 2020-02-18

## 2020-02-17 RX ORDER — FUROSEMIDE 10 MG/ML
40 INJECTION INTRAMUSCULAR; INTRAVENOUS ONCE
Status: COMPLETED | OUTPATIENT
Start: 2020-02-17 | End: 2020-02-17

## 2020-02-17 RX ADMIN — FUROSEMIDE 40 MG: 10 INJECTION, SOLUTION INTRAMUSCULAR; INTRAVENOUS at 07:45

## 2020-02-17 RX ADMIN — FUROSEMIDE 40 MG: 10 INJECTION, SOLUTION INTRAMUSCULAR; INTRAVENOUS at 20:25

## 2020-02-17 RX ADMIN — BUSPIRONE HYDROCHLORIDE 5 MG: 10 TABLET ORAL at 05:10

## 2020-02-17 RX ADMIN — GABAPENTIN 100 MG: 100 CAPSULE ORAL at 12:59

## 2020-02-17 RX ADMIN — LISINOPRIL 5 MG: 5 TABLET ORAL at 05:11

## 2020-02-17 RX ADMIN — GABAPENTIN 100 MG: 100 CAPSULE ORAL at 16:53

## 2020-02-17 RX ADMIN — METOPROLOL SUCCINATE 25 MG: 25 TABLET, EXTENDED RELEASE ORAL at 05:11

## 2020-02-17 RX ADMIN — BUSPIRONE HYDROCHLORIDE 5 MG: 10 TABLET ORAL at 12:59

## 2020-02-17 RX ADMIN — POTASSIUM CHLORIDE 40 MEQ: 1500 TABLET, EXTENDED RELEASE ORAL at 07:45

## 2020-02-17 RX ADMIN — ATORVASTATIN CALCIUM 80 MG: 80 TABLET, FILM COATED ORAL at 05:11

## 2020-02-17 RX ADMIN — FUROSEMIDE 40 MG: 10 INJECTION, SOLUTION INTRAMUSCULAR; INTRAVENOUS at 12:59

## 2020-02-17 RX ADMIN — GABAPENTIN 100 MG: 100 CAPSULE ORAL at 05:10

## 2020-02-17 RX ADMIN — BUSPIRONE HYDROCHLORIDE 5 MG: 10 TABLET ORAL at 20:25

## 2020-02-17 RX ADMIN — OMEPRAZOLE 20 MG: 20 CAPSULE, DELAYED RELEASE ORAL at 05:11

## 2020-02-17 RX ADMIN — BENZOCAINE AND MENTHOL 1 LOZENGE: 15; 3.6 LOZENGE ORAL at 20:25

## 2020-02-17 ASSESSMENT — ENCOUNTER SYMPTOMS
SHORTNESS OF BREATH: 1
ABDOMINAL PAIN: 0
VOMITING: 0
CONSTIPATION: 0
NECK PAIN: 0
TINGLING: 0
BLOOD IN STOOL: 0
SPUTUM PRODUCTION: 1
CLAUDICATION: 0
DEPRESSION: 0
NAUSEA: 0
PHOTOPHOBIA: 0
BACK PAIN: 0
BLURRED VISION: 0
DIZZINESS: 0
FEVER: 0
PALPITATIONS: 0
TREMORS: 0
SHORTNESS OF BREATH: 0
DOUBLE VISION: 0
DIARRHEA: 0
MYALGIAS: 0
HEADACHES: 0
HEARTBURN: 0
EYE PAIN: 0
HALLUCINATIONS: 0
SPUTUM PRODUCTION: 0
COUGH: 1

## 2020-02-17 NOTE — CARE PLAN
Problem: Communication  Goal: The ability to communicate needs accurately and effectively will improve  Outcome: PROGRESSING AS EXPECTED  Note: Pt communicating needs appropriately. Oriented to the call light and to call for assistance.        Problem: Safety  Goal: Will remain free from falls  Outcome: PROGRESSING AS EXPECTED  Note: Safety precautions reviewed with pt, pt verbalized understanding and denies questions.  Pt demonstrated ability to use call light for assistance.

## 2020-02-17 NOTE — ANESTHESIA POSTPROCEDURE EVALUATION
Patient: Tameka Montanez    Procedure Summary     Date:  02/16/20 Room / Location:  [facility] OR 06 / SURGERY [facility] ORS    Anesthesia Start:  1233 Anesthesia Stop:  1320    Procedure:  COLONOSCOPY, WITH POLYPECTOMY, tattoo ink, biopsies (N/A Anus) Diagnosis:  (colon polyp, colon mass, diverticulosis)    Surgeon:  Jc Rolon M.D. Responsible Provider:  Wiley Shine M.D.    Anesthesia Type:  MAC ASA Status:  3          Final Anesthesia Type: MAC  Last vitals  BP   Blood Pressure : 131/62    Temp   36 °C (96.8 °F)    Pulse   Pulse: 75   Resp   17    SpO2   92 %      Anesthesia Post Evaluation    Patient location during evaluation: PACU  Patient participation: complete - patient participated  Level of consciousness: awake and alert  Pain score: 0    Airway patency: patent  Anesthetic complications: no  Cardiovascular status: hemodynamically stable  Respiratory status: acceptable  Hydration status: euvolemic    PONV: none           Nurse Pain Score: 0 (NPRS)

## 2020-02-17 NOTE — DIETARY
Nutrition Services: Update   Day 4 of admit.  Tameka Montanez is a 79 y.o. female with admitting DX of GI Bleed and anemia.      Assessment:  1. Pt seen at bedside asleep. Spoke with , reports good PO intake PTA and denies wt loss for pt despite BMI of 17.3.  reports pt tolerated full liquid diet yesterday   2. Pt had EGD on 2/14 that was normal, then had colonoscopy on 2/16 with polyp removal and biopsy of right colon mass.  3. Gen surg notes plan for right hemicolectomy today.  4. Labs: K 3.3, glucose 100-104  5. Meds: lipitor, lasix, colace, zofran prn, bowel protocol  6. Last BM: 2/16    Malnutrition Risk: Malnutrition criteria not met at this time, would benefit from nutrition focused physical exam when able.    Recommendations/Plan:  1. Advance diet past NPO/clear liquids when appropriate to GI soft diet. Boost Breeze with meals pending MD approval.  2. Encourage intake of meals  3. Document intake of all meals as % taken in ADL's to provide interdisciplinary communication across all shifts.   4. Monitor weight.  5. Nutrition rep will continue to see patient for ongoing meal and snack preferences.    RD following.

## 2020-02-17 NOTE — PROGRESS NOTES
Hospital Medicine Daily Progress Note    Date of Service  2/17/2020    Chief Complaint  79 y.o. female admitted 2/13/2020 with severe anemia      Interval Problem Update    No melena    Hb low at  7.6    Low potassium 3.3    Afebrile    Normal egd    Colonoscopy- right colon mass    Elevated bnp 2000    Pt c/o dry mouth    She is complaining of congestion    I ordered and reviewed cxr today  - pulmonary edema +/- bilateral pleural effusions    elevated bnp of 2000. Patient did receive iv lasix yesterday.    I discussed case with dr aggarwal of surgery and he agrees , that surgery be postponed until tomorrow to allow more time to improve patients pulmonary status.    Surgery postponed until 2/18. Patient and  made aware.    Consultants/Specialty  Dr wu gi    Surgery dr aggarwal    Code Status  full    Disposition  home    Review of Systems  Review of Systems   Constitutional: Positive for malaise/fatigue. Negative for fever.   HENT: Negative for ear pain, hearing loss and tinnitus.    Eyes: Negative for blurred vision, double vision, photophobia and pain.   Respiratory: Positive for cough and shortness of breath. Negative for sputum production.    Cardiovascular: Negative for chest pain, palpitations and claudication.   Gastrointestinal: Negative for abdominal pain, heartburn, nausea and vomiting.   Genitourinary: Negative for dysuria, frequency and urgency.   Musculoskeletal: Negative for back pain, myalgias and neck pain.   Neurological: Negative for dizziness, tingling, tremors and headaches.   Psychiatric/Behavioral: Negative for depression and hallucinations.   All other systems reviewed and are negative.       Physical Exam  Temp:  [36 °C (96.8 °F)-37.3 °C (99.2 °F)] 37.3 °C (99.2 °F)  Pulse:  [60-79] 68  Resp:  [16-26] 17  BP: ()/(38-67) 139/62  SpO2:  [91 %-100 %] 100 %    Physical Exam  Constitutional:       General: She is not in acute distress.     Appearance: Normal appearance. She is normal  weight. She is ill-appearing.   HENT:      Head: Normocephalic and atraumatic.      Nose: No congestion.   Eyes:      General: No scleral icterus.     Extraocular Movements: Extraocular movements intact.      Pupils: Pupils are equal, round, and reactive to light.   Neck:      Musculoskeletal: Normal range of motion and neck supple.   Cardiovascular:      Rate and Rhythm: Normal rate and regular rhythm.      Pulses: Normal pulses.      Heart sounds: Normal heart sounds.   Pulmonary:      Effort: Pulmonary effort is normal.      Breath sounds: Rhonchi (coarse b/l rhonchi) present. No rales.   Abdominal:      General: Abdomen is flat. Bowel sounds are normal. There is no distension.      Palpations: There is no mass.      Tenderness: There is no abdominal tenderness. There is no guarding or rebound.      Hernia: No hernia is present.   Musculoskeletal: Normal range of motion.         General: No swelling, tenderness, deformity or signs of injury.      Right lower leg: No edema.      Left lower leg: No edema.   Skin:     General: Skin is warm.      Capillary Refill: Capillary refill takes 2 to 3 seconds.      Coloration: Skin is pale. Skin is not jaundiced.      Findings: No bruising, erythema or rash.   Neurological:      General: No focal deficit present.      Mental Status: She is alert.      Motor: Weakness present.   Psychiatric:         Mood and Affect: Mood normal.         Fluids    Intake/Output Summary (Last 24 hours) at 2/17/2020 1053  Last data filed at 2/17/2020 0749  Gross per 24 hour   Intake 760 ml   Output 800 ml   Net -40 ml       Laboratory  Recent Labs     02/15/20  1454 02/15/20  2319 02/17/20  0259   WBC  --   --  7.1   RBC  --   --  3.17*   HEMOGLOBIN 9.1* 8.9* 7.6*   HEMATOCRIT  --   --  25.4*   MCV  --   --  80.1*   MCH  --   --  24.0*   MCHC  --   --  29.9*   RDW  --   --  66.7*   PLATELETCT  --   --  123*   MPV  --   --  11.0     Recent Labs     02/15/20  0733 02/16/20  0230 02/17/20  0259    SODIUM 140 140 141   POTASSIUM 3.4* 3.2* 3.3*   CHLORIDE 109 106 107   CO2 21 25 28   GLUCOSE 105* 100* 104*   BUN 23* 13 8   CREATININE 0.68 0.55 0.54   CALCIUM 8.3* 8.2* 8.0*                   Imaging  DX-CHEST-PORTABLE (1 VIEW)   Final Result      1.  Worsening bibasilar consolidation and/or pleural fluid, particularly in the LEFT.   2.  Minimal interval improvement of pulmonary edema.   3.  No other significant change from prior exam.         EC-ECHOCARDIOGRAM COMPLETE W/O CONT   Final Result      DX-CHEST-2 VIEWS   Final Result      1.  Bilateral interstitial opacities consistent with pulmonary edema. Small bilateral pleural effusions.   2.  Mild cardiac silhouette enlargement.   3.  Emphysematous changes.           Assessment/Plan  * GIB (gastrointestinal bleeding)- (present on admission)  Assessment & Plan  Patient with a prior history of previous GI bleed last fall.  Records from outside facility indicates that an occult stool was done in the emergency room and was positive.  She currently is now status post 2 units packed red blood cell transfusion     Colonoscopy --> right colon mass    Iv PPI to po on 2/15    Anemia requiring transfusions- (present on admission)  Assessment & Plan  Now status post 2 units, treatment as noted above.    Colonic mass- (present on admission)  Assessment & Plan  Surgery dr aggarwal consulted    Planned surgery on 2/18    Hypokalemia  Assessment & Plan  Replace po    Acute pulmonary edema (HCC)  Assessment & Plan  ivf stopped    Lasix 40mg x 1 orderd on 2/15 and 2/16  Echo-no acute findings    On 2/17- cxr reviewed--> iv lasix 40mg iv q8 x 3 doses then reassess    Pulmonary emphysema (HCC)- (present on admission)  Assessment & Plan  Not in acute exacerbation    Oxygen    Bronchodilators prn    HTN (hypertension)- (present on admission)  Assessment & Plan  Home lisinopril and lopressor held for allow BP room for effective diuresis  .    PAD (peripheral artery disease) (HCC)-  (present on admission)  Assessment & Plan  Patient states that she underwent angioplasty in December with Dr. Mohsin of vascular surgery.  She has been taking aspirin and Plavix in addition to her Lipitor.  Holding both aspirin and Plavix for now, okay to continue home Lipitor.       VTE prophylaxis: scd      Check am cbc, bmp, bnp

## 2020-02-17 NOTE — PROGRESS NOTES
Received bedside report from Brittany GOVEA, assumed patient care. Patient is A&Ox4. Respirations regular and non-labored on 2L NC. No reports of pain at this time. Tele monitor on and verified. POC reviewed with verbalized understanding. All appropriate fall precautions in place. Bed in low position, side rails up x2, bed alarm on and call light within reach. Hourly rounding initiated.

## 2020-02-17 NOTE — CARE PLAN
Problem: Nutritional:  Goal: Achieve adequate nutritional intake  Description: Diet > NPO and patient will consume >50% of meals.   Outcome: PROGRESSING SLOWER THAN EXPECTED    See RD note.

## 2020-02-17 NOTE — CONSULTS
DATE OF SERVICE:  02/16/2020    SURGERY CONSULTATION    HISTORY OF PRESENT ILLNESS:  The patient is an 79-year-old woman who I have   been asked to evaluate further for a right colon mass.  She was admitted on   02/14/2020 with anemia.  Subsequent workup including a colonoscopy done today   did reveal a right colon mass.  This is felt to be the source of her bleeding   and may represent a malignancy.  The patient denies ever noticing any blood   per rectum, but has had symptoms of chest pain and palpitations as well as an   overall weakness.    PAST MEDICAL HISTORY:  Does include peptic ulcer disease, dyslipidemia,   hypertension, and vascular disease.    PAST SURGICAL HISTORY:  Includes appendectomy.    MEDICATIONS:  Prior to admission include:  1.  Plavix 75 mg a day.  2.  BuSpar 5 mg t.i.d.  3.  Lipitor 80 mg daily.  4.  Ultram 50 mg p.o. q. 6 hours p.r.n.  5.  Potassium 20 mEq a day.  6.  Lasix 40 mg a day.  7.  Metoprolol 25 mg daily.  8.  Prinivil 5 mg daily.  9.  Aspirin 81 mg a day.  10.  Some type of mild spray.    ALLERGIES:  SHE IS ALLERGIC TO SULFA DRUGS AND TETRACYCLINE.    SOCIAL HISTORY:  She has a 42-pack-year history of smoking and continues to   smoke a pack a day.  She does not drink, does not use illicit drugs.    FAMILY HISTORY:  Essentially negative.    REVIEW OF SYSTEMS:  Negative.    PHYSICAL EXAMINATION:  VITAL SIGNS:  She has a temperature of 36.4, pulse 77, blood pressure is   149/66.  GENERAL:  She is lying in bed and is in no distress.  She is somewhat   tremulous.  HEENT:  Unremarkable.  Her pupils are equal.  Oropharynx without lesions.  NECK:  Supple.  LUNGS:  Clear.  CARDIOVASCULAR:  Reveals regular rate and rhythm.  ABDOMEN:  Soft and nontender.  EXTREMITIES:  Without clubbing, cyanosis, or edema.  She has palpable radial   and femoral pulses.  NEUROLOGIC:  She is neurologically intact without any grossly detectable motor   or sensory deficits.  SKIN:  Warm and dry.    LABORATORY  DATA:  Includes a white count of 5.1, hematocrit of 25.4, and   platelets of 135.  Her sodium is 140, potassium is 3.2, chloride 106, CO2 is   25, BUN 13, and creatinine 0.55.  Her BNP was 2352.  She had a colonoscopy,   which did reveal a mass in her right colon.    IMPRESSION:  An 79-year-old woman with chronic anemia and workup revealing a   mass in the right colon, which may be a malignancy and is likely the source of   her anemia.  A right hemicolectomy was therefore indicated.  I discussed this   with her and her  including a minimally invasive approach using the da   Valerie machine, the potential for converting to an open procedure, and   associated risk of bleeding, infection, abscess, and hematoma.  I also   discussed risk of anastomotic leak and anastomotic stricture and the potential   for injury to intraperitoneal organs.  Her BNP is elevated and I think she   would benefit from some optimization with some diuresis.  I will discuss this   with the hospitalist service.  Plan on performing the surgery tomorrow later   in the day.  I discussed this with the patient and her  as well.       ____________________________________     MD SADA WILKINS / SHRUTHI    DD:  02/16/2020 14:47:40  DT:  02/16/2020 18:30:42    D#:  [id number]  Job#:  [id number]

## 2020-02-17 NOTE — PROGRESS NOTES
"Pt informed that surgery will be moved to tomorrow due to increased fluid in her lungs today. Patient does not demonstrate understanding and had complaints of her thrush and being put on a clear liquid diet. Patient says that \"itll be another day without food\" and does not understand that a clear liquid diet is food. Patient is non understanding that she cant be on a regular diet because of the mass near her rectum that will be surgically removed tomorrow.  "

## 2020-02-17 NOTE — PROGRESS NOTES
Surgical Progress Note    Author: Wes Pat M.D. Date & Time created: 2020   1:47 PM     Interval Events:  No further bleeding  bnp improved but still high    Review of Systems   Respiratory: Negative for shortness of breath.    Gastrointestinal: Negative for abdominal pain.     Hemodynamics:  Temp (24hrs), Av.4 °C (97.6 °F), Min:36 °C (96.8 °F), Max:37.3 °C (99.2 °F)  Temperature: 36.2 °C (97.2 °F)  Pulse  Av.5  Min: 16  Max: 81   Blood Pressure : 148/77     Respiratory:    Respiration: 18, Pulse Oximetry: 99 %     Work Of Breathing / Effort: Moderate  RUL Breath Sounds: Diminished, RML Breath Sounds: Diminished, RLL Breath Sounds: Diminished, ANABEL Breath Sounds: Diminished, LLL Breath Sounds: Diminished  Neuro:  GCS       Fluids:    Intake/Output Summary (Last 24 hours) at 2020 1347  Last data filed at 2020 0749  Gross per 24 hour   Intake 360 ml   Output 800 ml   Net -440 ml        Current Diet Order   Procedures   • Diet Order Clear Liquid   • Diet NPO     Physical Exam  Constitutional:       Comments: elderly   HENT:      Head: Normocephalic.      Nose: Nose normal.   Eyes:      Extraocular Movements: Extraocular movements intact.      Pupils: Pupils are equal, round, and reactive to light.   Neck:      Musculoskeletal: Normal range of motion and neck supple.   Cardiovascular:      Rate and Rhythm: Normal rate and regular rhythm.      Pulses: Normal pulses.      Heart sounds: Normal heart sounds.   Pulmonary:      Effort: Pulmonary effort is normal.   Abdominal:      General: Abdomen is flat. There is no distension.      Tenderness: There is no abdominal tenderness.   Musculoskeletal: Normal range of motion.   Skin:     General: Skin is warm and dry.   Neurological:      General: No focal deficit present.      Mental Status: She is alert and oriented to person, place, and time.      Cranial Nerves: No cranial nerve deficit.       Labs:  Recent Results (from the past 24 hour(s))    CBC WITHOUT DIFFERENTIAL    Collection Time: 02/17/20  2:59 AM   Result Value Ref Range    WBC 7.1 4.8 - 10.8 K/uL    RBC 3.17 (L) 4.20 - 5.40 M/uL    Hemoglobin 7.6 (L) 12.0 - 16.0 g/dL    Hematocrit 25.4 (L) 37.0 - 47.0 %    MCV 80.1 (L) 81.4 - 97.8 fL    MCH 24.0 (L) 27.0 - 33.0 pg    MCHC 29.9 (L) 33.6 - 35.0 g/dL    RDW 66.7 (H) 35.9 - 50.0 fL    Platelet Count 123 (L) 164 - 446 K/uL    MPV 11.0 9.0 - 12.9 fL   Basic Metabolic Panel    Collection Time: 02/17/20  2:59 AM   Result Value Ref Range    Sodium 141 135 - 145 mmol/L    Potassium 3.3 (L) 3.6 - 5.5 mmol/L    Chloride 107 96 - 112 mmol/L    Co2 28 20 - 33 mmol/L    Glucose 104 (H) 65 - 99 mg/dL    Bun 8 8 - 22 mg/dL    Creatinine 0.54 0.50 - 1.40 mg/dL    Calcium 8.0 (L) 8.5 - 10.5 mg/dL    Anion Gap 6.0 0.0 - 11.9   proBrain Natriuretic Peptide, NT    Collection Time: 02/17/20  2:59 AM   Result Value Ref Range    NT-proBNP 2011 (H) 0 - 125 pg/mL   ESTIMATED GFR    Collection Time: 02/17/20  2:59 AM   Result Value Ref Range    GFR If African American >60 >60 mL/min/1.73 m 2    GFR If Non African American >60 >60 mL/min/1.73 m 2   Histology Request    Collection Time: 02/17/20  6:48 AM   Result Value Ref Range    Pathology Request Sent to [facility]    PROCALCITONIN    Collection Time: 02/17/20 12:40 PM   Result Value Ref Range    Procalcitonin <0.05 <0.25 ng/mL     Medical Decision Making, by Problem:  Active Hospital Problems    Diagnosis   • GIB (gastrointestinal bleeding) [K92.2]     Priority: High   • Anemia requiring transfusions [D64.9]     Priority: High   • Colonic mass [K63.89]   • Acute pulmonary edema (HCC) [J81.0]   • Hypokalemia [E87.6]   • PAD (peripheral artery disease) (HCC) [I73.9]   • HTN (hypertension) [I10]   • Pulmonary emphysema (HCC) [J43.9]     Plan:  Ongoing diuresis  Surgery rescheduled for tomorrow  discussed with pt      Quality Measures:  Quality-Core Measures   Reviewed items::  Labs reviewed, Medications reviewed and Radiology  images reviewed      Discussed patient condition with Hospitalist and Patient

## 2020-02-17 NOTE — PROGRESS NOTES
Gastroenterology Progress Note     Author: JOSE Kumar   Date & Time Created: 2/17/2020 10:28 AM    Chief Complaint:  Anemia    Interval History:  Colonoscopy yesterday revealed a right sided colonic mass, polypectomy from sigmoid colon.  No bowel movement overnight.  Denies nausea, vomiting, abdominal pain.     Review of Systems:  Review of Systems   Respiratory: Positive for sputum production. Negative for shortness of breath.    Cardiovascular: Negative for chest pain.   Gastrointestinal: Negative for abdominal pain, blood in stool, constipation, diarrhea, heartburn, melena, nausea and vomiting.       Physical Exam:  Physical Exam  Constitutional:       General: She is not in acute distress.  HENT:      Head: Normocephalic.   Eyes:      Pupils: Pupils are equal, round, and reactive to light.   Cardiovascular:      Rate and Rhythm: Normal rate and regular rhythm.      Pulses: Normal pulses.      Heart sounds: Normal heart sounds.   Pulmonary:      Effort: Pulmonary effort is normal.      Comments: Diminished in bases  Abdominal:      General: Abdomen is flat. Bowel sounds are normal. There is no distension.      Palpations: Abdomen is soft.      Tenderness: There is no abdominal tenderness.   Skin:     General: Skin is warm and dry.      Coloration: Skin is pale.   Neurological:      General: No focal deficit present.      Mental Status: She is alert.   Psychiatric:         Mood and Affect: Mood normal.         Behavior: Behavior normal.         Thought Content: Thought content normal.         Judgment: Judgment normal.         Labs:          Recent Labs     02/15/20  0733 02/16/20  0230 02/17/20  0259   SODIUM 140 140 141   POTASSIUM 3.4* 3.2* 3.3*   CHLORIDE 109 106 107   CO2 21 25 28   BUN 23* 13 8   CREATININE 0.68 0.55 0.54   CALCIUM 8.3* 8.2* 8.0*     Recent Labs     02/15/20  0733 02/16/20  0230 02/17/20  0259   GLUCOSE 105* 100* 104*     Recent Labs     02/15/20  1454  02/15/20  2319 20  0259   RBC  --   --  3.17*   HEMOGLOBIN 9.1* 8.9* 7.6*   HEMATOCRIT  --   --  25.4*   PLATELETCT  --   --  123*     Recent Labs     20  0259   WBC 7.1     Hemodynamics:  Temp (24hrs), Av.5 °C (97.7 °F), Min:36 °C (96.8 °F), Max:37.3 °C (99.2 °F)  Temperature: 37.3 °C (99.2 °F)  Pulse  Av.3  Min: 16  Max: 81   Blood Pressure : 139/62     Respiratory:    Respiration: 17, Pulse Oximetry: 100 %     Work Of Breathing / Effort: Moderate  RUL Breath Sounds: Diminished, RML Breath Sounds: Diminished, RLL Breath Sounds: Diminished, ANABEL Breath Sounds: Diminished, LLL Breath Sounds: Diminished  Fluids:    Intake/Output Summary (Last 24 hours) at 2020 1028  Last data filed at 2020 0749  Gross per 24 hour   Intake 760 ml   Output 800 ml   Net -40 ml        GI/Nutrition:  Orders Placed This Encounter   Procedures   • Diet NPO     Standing Status:   Standing     Number of Occurrences:   8     Order Specific Question:   Restrict to:     Answer:   Sips with Medications [3]     Medical Decision Making, by Problem:  Active Hospital Problems    Diagnosis   • *GIB (gastrointestinal bleeding) [K92.2]   • Anemia requiring transfusions [D64.9]   • Colonic mass [K63.89]   • Acute pulmonary edema (HCC) [J81.0]   • Hypokalemia [E87.6]   • PAD (peripheral artery disease) (HCC) [I73.9]   • HTN (hypertension) [I10]   • Pulmonary emphysema (HCC) [J43.9]       Plan:  Right colon mass will need to be addressed surgically.  Right hemicolectomy planned today, await further recommendations from general surgery.  Continue to trend hgb, transfuse PRN to keep greater than 7.  Currently NPO in preparation of surgery.       Quality-Core Measures

## 2020-02-18 ENCOUNTER — ANESTHESIA EVENT (OUTPATIENT)
Dept: SURGERY | Facility: MEDICAL CENTER | Age: 80
DRG: 329 | End: 2020-02-18
Payer: MEDICARE

## 2020-02-18 ENCOUNTER — ANESTHESIA (OUTPATIENT)
Dept: SURGERY | Facility: MEDICAL CENTER | Age: 80
DRG: 329 | End: 2020-02-18
Payer: MEDICARE

## 2020-02-18 LAB
ABO GROUP BLD: NORMAL
ANION GAP SERPL CALC-SCNC: 6 MMOL/L (ref 0–11.9)
BLD GP AB SCN SERPL QL: NORMAL
BUN SERPL-MCNC: 8 MG/DL (ref 8–22)
CALCIUM SERPL-MCNC: 7.8 MG/DL (ref 8.5–10.5)
CHLORIDE SERPL-SCNC: 100 MMOL/L (ref 96–112)
CO2 SERPL-SCNC: 31 MMOL/L (ref 20–33)
CREAT SERPL-MCNC: 0.47 MG/DL (ref 0.5–1.4)
ERYTHROCYTE [DISTWIDTH] IN BLOOD BY AUTOMATED COUNT: 68 FL (ref 35.9–50)
GLUCOSE SERPL-MCNC: 97 MG/DL (ref 65–99)
HCT VFR BLD AUTO: 27.5 % (ref 37–47)
HGB BLD-MCNC: 8 G/DL (ref 12–16)
MCH RBC QN AUTO: 23.4 PG (ref 27–33)
MCHC RBC AUTO-ENTMCNC: 29.1 G/DL (ref 33.6–35)
MCV RBC AUTO: 80.4 FL (ref 81.4–97.8)
NT-PROBNP SERPL IA-MCNC: 1617 PG/ML (ref 0–125)
PLATELET # BLD AUTO: 122 K/UL (ref 164–446)
PMV BLD AUTO: 10.3 FL (ref 9–12.9)
POTASSIUM SERPL-SCNC: 3.5 MMOL/L (ref 3.6–5.5)
RBC # BLD AUTO: 3.42 M/UL (ref 4.2–5.4)
RH BLD: NORMAL
SODIUM SERPL-SCNC: 137 MMOL/L (ref 135–145)
WBC # BLD AUTO: 6.3 K/UL (ref 4.8–10.8)

## 2020-02-18 PROCEDURE — 502714 HCHG ROBOTIC SURGERY SERVICES: Performed by: SURGERY

## 2020-02-18 PROCEDURE — 88307 TISSUE EXAM BY PATHOLOGIST: CPT

## 2020-02-18 PROCEDURE — 85027 COMPLETE CBC AUTOMATED: CPT

## 2020-02-18 PROCEDURE — 700101 HCHG RX REV CODE 250: Performed by: ANESTHESIOLOGY

## 2020-02-18 PROCEDURE — 8E0W4CZ ROBOTIC ASSISTED PROCEDURE OF TRUNK REGION, PERCUTANEOUS ENDOSCOPIC APPROACH: ICD-10-PCS | Performed by: SURGERY

## 2020-02-18 PROCEDURE — 700111 HCHG RX REV CODE 636 W/ 250 OVERRIDE (IP): Performed by: ANESTHESIOLOGY

## 2020-02-18 PROCEDURE — 160048 HCHG OR STATISTICAL LEVEL 1-5: Performed by: SURGERY

## 2020-02-18 PROCEDURE — 700102 HCHG RX REV CODE 250 W/ 637 OVERRIDE(OP): Performed by: HOSPITALIST

## 2020-02-18 PROCEDURE — A9270 NON-COVERED ITEM OR SERVICE: HCPCS

## 2020-02-18 PROCEDURE — 700101 HCHG RX REV CODE 250: Performed by: SURGERY

## 2020-02-18 PROCEDURE — 160035 HCHG PACU - 1ST 60 MINS PHASE I: Performed by: SURGERY

## 2020-02-18 PROCEDURE — A9270 NON-COVERED ITEM OR SERVICE: HCPCS | Performed by: SURGERY

## 2020-02-18 PROCEDURE — 99232 SBSQ HOSP IP/OBS MODERATE 35: CPT | Performed by: HOSPITALIST

## 2020-02-18 PROCEDURE — 160036 HCHG PACU - EA ADDL 30 MINS PHASE I: Performed by: SURGERY

## 2020-02-18 PROCEDURE — 36415 COLL VENOUS BLD VENIPUNCTURE: CPT

## 2020-02-18 PROCEDURE — 500030 HCHG BABCOCK, ENDO 10MM: Performed by: SURGERY

## 2020-02-18 PROCEDURE — 501571 HCHG TROCAR, SEPARATOR 12X100: Performed by: SURGERY

## 2020-02-18 PROCEDURE — 160009 HCHG ANES TIME/MIN: Performed by: SURGERY

## 2020-02-18 PROCEDURE — 500868 HCHG NEEDLE, SURGI(VARES): Performed by: SURGERY

## 2020-02-18 PROCEDURE — 160002 HCHG RECOVERY MINUTES (STAT): Performed by: SURGERY

## 2020-02-18 PROCEDURE — A9270 NON-COVERED ITEM OR SERVICE: HCPCS | Performed by: HOSPITALIST

## 2020-02-18 PROCEDURE — 501445 HCHG STAPLER, SKIN DISP: Performed by: SURGERY

## 2020-02-18 PROCEDURE — 501838 HCHG SUTURE GENERAL: Performed by: SURGERY

## 2020-02-18 PROCEDURE — 700102 HCHG RX REV CODE 250 W/ 637 OVERRIDE(OP): Performed by: NURSE PRACTITIONER

## 2020-02-18 PROCEDURE — 502240 HCHG MISC OR SUPPLY RC 0272: Performed by: SURGERY

## 2020-02-18 PROCEDURE — 700105 HCHG RX REV CODE 258: Performed by: ANESTHESIOLOGY

## 2020-02-18 PROCEDURE — 700105 HCHG RX REV CODE 258: Performed by: SURGERY

## 2020-02-18 PROCEDURE — 86850 RBC ANTIBODY SCREEN: CPT

## 2020-02-18 PROCEDURE — 700111 HCHG RX REV CODE 636 W/ 250 OVERRIDE (IP): Performed by: HOSPITALIST

## 2020-02-18 PROCEDURE — 83880 ASSAY OF NATRIURETIC PEPTIDE: CPT

## 2020-02-18 PROCEDURE — A4314 CATH W/DRAINAGE 2-WAY LATEX: HCPCS | Performed by: SURGERY

## 2020-02-18 PROCEDURE — 160031 HCHG SURGERY MINUTES - 1ST 30 MINS LEVEL 5: Performed by: SURGERY

## 2020-02-18 PROCEDURE — 700102 HCHG RX REV CODE 250 W/ 637 OVERRIDE(OP): Performed by: SURGERY

## 2020-02-18 PROCEDURE — 86900 BLOOD TYPING SEROLOGIC ABO: CPT

## 2020-02-18 PROCEDURE — 86901 BLOOD TYPING SEROLOGIC RH(D): CPT

## 2020-02-18 PROCEDURE — A9270 NON-COVERED ITEM OR SERVICE: HCPCS | Performed by: NURSE PRACTITIONER

## 2020-02-18 PROCEDURE — 0DTF4ZZ RESECTION OF RIGHT LARGE INTESTINE, PERCUTANEOUS ENDOSCOPIC APPROACH: ICD-10-PCS | Performed by: SURGERY

## 2020-02-18 PROCEDURE — 770020 HCHG ROOM/CARE - TELE (206)

## 2020-02-18 PROCEDURE — 88309 TISSUE EXAM BY PATHOLOGIST: CPT

## 2020-02-18 PROCEDURE — 80048 BASIC METABOLIC PNL TOTAL CA: CPT

## 2020-02-18 PROCEDURE — 160042 HCHG SURGERY MINUTES - EA ADDL 1 MIN LEVEL 5: Performed by: SURGERY

## 2020-02-18 PROCEDURE — 700102 HCHG RX REV CODE 250 W/ 637 OVERRIDE(OP)

## 2020-02-18 RX ORDER — BUPIVACAINE HYDROCHLORIDE AND EPINEPHRINE 5; 5 MG/ML; UG/ML
INJECTION, SOLUTION EPIDURAL; INTRACAUDAL; PERINEURAL
Status: DISCONTINUED | OUTPATIENT
Start: 2020-02-18 | End: 2020-02-18 | Stop reason: HOSPADM

## 2020-02-18 RX ORDER — SODIUM CHLORIDE, SODIUM GLUCONATE, SODIUM ACETATE, POTASSIUM CHLORIDE AND MAGNESIUM CHLORIDE 526; 502; 368; 37; 30 MG/100ML; MG/100ML; MG/100ML; MG/100ML; MG/100ML
INJECTION, SOLUTION INTRAVENOUS
Status: DISCONTINUED | OUTPATIENT
Start: 2020-02-18 | End: 2020-02-18 | Stop reason: SURG

## 2020-02-18 RX ORDER — LABETALOL HYDROCHLORIDE 5 MG/ML
5 INJECTION, SOLUTION INTRAVENOUS
Status: DISCONTINUED | OUTPATIENT
Start: 2020-02-18 | End: 2020-02-18 | Stop reason: HOSPADM

## 2020-02-18 RX ORDER — DIPHENHYDRAMINE HYDROCHLORIDE 50 MG/ML
12.5 INJECTION INTRAMUSCULAR; INTRAVENOUS
Status: DISCONTINUED | OUTPATIENT
Start: 2020-02-18 | End: 2020-02-18 | Stop reason: HOSPADM

## 2020-02-18 RX ORDER — MORPHINE SULFATE 4 MG/ML
2 INJECTION, SOLUTION INTRAMUSCULAR; INTRAVENOUS
Status: DISCONTINUED | OUTPATIENT
Start: 2020-02-18 | End: 2020-02-18 | Stop reason: HOSPADM

## 2020-02-18 RX ORDER — MORPHINE SULFATE 10 MG/ML
5 INJECTION, SOLUTION INTRAMUSCULAR; INTRAVENOUS
Status: DISCONTINUED | OUTPATIENT
Start: 2020-02-18 | End: 2020-02-18 | Stop reason: HOSPADM

## 2020-02-18 RX ORDER — SODIUM CHLORIDE, SODIUM LACTATE, POTASSIUM CHLORIDE, CALCIUM CHLORIDE 600; 310; 30; 20 MG/100ML; MG/100ML; MG/100ML; MG/100ML
INJECTION, SOLUTION INTRAVENOUS
Status: DISCONTINUED | OUTPATIENT
Start: 2020-02-18 | End: 2020-02-18 | Stop reason: SURG

## 2020-02-18 RX ORDER — POTASSIUM CHLORIDE 20 MEQ/1
40 TABLET, EXTENDED RELEASE ORAL ONCE
Status: COMPLETED | OUTPATIENT
Start: 2020-02-18 | End: 2020-02-18

## 2020-02-18 RX ORDER — OXYCODONE HYDROCHLORIDE 5 MG/1
5-10 TABLET ORAL
Status: DISCONTINUED | OUTPATIENT
Start: 2020-02-18 | End: 2020-02-24 | Stop reason: HOSPADM

## 2020-02-18 RX ORDER — HALOPERIDOL 5 MG/ML
1 INJECTION INTRAMUSCULAR
Status: DISCONTINUED | OUTPATIENT
Start: 2020-02-18 | End: 2020-02-18 | Stop reason: HOSPADM

## 2020-02-18 RX ORDER — METOPROLOL TARTRATE 1 MG/ML
1 INJECTION, SOLUTION INTRAVENOUS
Status: DISCONTINUED | OUTPATIENT
Start: 2020-02-18 | End: 2020-02-18 | Stop reason: HOSPADM

## 2020-02-18 RX ORDER — DEXAMETHASONE SODIUM PHOSPHATE 4 MG/ML
INJECTION, SOLUTION INTRA-ARTICULAR; INTRALESIONAL; INTRAMUSCULAR; INTRAVENOUS; SOFT TISSUE PRN
Status: DISCONTINUED | OUTPATIENT
Start: 2020-02-18 | End: 2020-02-18 | Stop reason: SURG

## 2020-02-18 RX ORDER — ROCURONIUM BROMIDE 10 MG/ML
INJECTION, SOLUTION INTRAVENOUS PRN
Status: DISCONTINUED | OUTPATIENT
Start: 2020-02-18 | End: 2020-02-18 | Stop reason: SURG

## 2020-02-18 RX ORDER — MORPHINE SULFATE 4 MG/ML
1 INJECTION, SOLUTION INTRAMUSCULAR; INTRAVENOUS
Status: DISCONTINUED | OUTPATIENT
Start: 2020-02-18 | End: 2020-02-18 | Stop reason: HOSPADM

## 2020-02-18 RX ORDER — ONDANSETRON 2 MG/ML
INJECTION INTRAMUSCULAR; INTRAVENOUS PRN
Status: DISCONTINUED | OUTPATIENT
Start: 2020-02-18 | End: 2020-02-18 | Stop reason: SURG

## 2020-02-18 RX ORDER — CEFOTETAN DISODIUM 2 G/20ML
INJECTION, POWDER, FOR SOLUTION INTRAMUSCULAR; INTRAVENOUS PRN
Status: DISCONTINUED | OUTPATIENT
Start: 2020-02-18 | End: 2020-02-18 | Stop reason: SURG

## 2020-02-18 RX ORDER — MIDAZOLAM HYDROCHLORIDE 1 MG/ML
INJECTION INTRAMUSCULAR; INTRAVENOUS PRN
Status: DISCONTINUED | OUTPATIENT
Start: 2020-02-18 | End: 2020-02-18 | Stop reason: SURG

## 2020-02-18 RX ORDER — SODIUM CHLORIDE, SODIUM LACTATE, POTASSIUM CHLORIDE, CALCIUM CHLORIDE 600; 310; 30; 20 MG/100ML; MG/100ML; MG/100ML; MG/100ML
INJECTION, SOLUTION INTRAVENOUS CONTINUOUS
Status: DISCONTINUED | OUTPATIENT
Start: 2020-02-18 | End: 2020-02-18 | Stop reason: HOSPADM

## 2020-02-18 RX ORDER — HYDRALAZINE HYDROCHLORIDE 20 MG/ML
5 INJECTION INTRAMUSCULAR; INTRAVENOUS
Status: DISCONTINUED | OUTPATIENT
Start: 2020-02-18 | End: 2020-02-18 | Stop reason: HOSPADM

## 2020-02-18 RX ORDER — SODIUM CHLORIDE, SODIUM LACTATE, POTASSIUM CHLORIDE, CALCIUM CHLORIDE 600; 310; 30; 20 MG/100ML; MG/100ML; MG/100ML; MG/100ML
INJECTION, SOLUTION INTRAVENOUS EVERY 6 HOURS
Status: COMPLETED | OUTPATIENT
Start: 2020-02-18 | End: 2020-02-18

## 2020-02-18 RX ADMIN — ROCURONIUM BROMIDE 25 MG: 10 INJECTION, SOLUTION INTRAVENOUS at 13:23

## 2020-02-18 RX ADMIN — FENTANYL CITRATE 50 MCG: 50 INJECTION, SOLUTION INTRAMUSCULAR; INTRAVENOUS at 15:54

## 2020-02-18 RX ADMIN — SODIUM CHLORIDE, POTASSIUM CHLORIDE, SODIUM LACTATE AND CALCIUM CHLORIDE: 600; 310; 30; 20 INJECTION, SOLUTION INTRAVENOUS at 13:09

## 2020-02-18 RX ADMIN — FUROSEMIDE 40 MG: 10 INJECTION, SOLUTION INTRAMUSCULAR; INTRAVENOUS at 05:44

## 2020-02-18 RX ADMIN — ROCURONIUM BROMIDE 5 MG: 10 INJECTION, SOLUTION INTRAVENOUS at 14:44

## 2020-02-18 RX ADMIN — FENTANYL CITRATE 25 MCG: 0.05 INJECTION, SOLUTION INTRAMUSCULAR; INTRAVENOUS at 17:25

## 2020-02-18 RX ADMIN — SODIUM CHLORIDE, POTASSIUM CHLORIDE, SODIUM LACTATE AND CALCIUM CHLORIDE: 600; 310; 30; 20 INJECTION, SOLUTION INTRAVENOUS at 17:00

## 2020-02-18 RX ADMIN — FENTANYL CITRATE 25 MCG: 0.05 INJECTION, SOLUTION INTRAMUSCULAR; INTRAVENOUS at 16:49

## 2020-02-18 RX ADMIN — BUSPIRONE HYDROCHLORIDE 5 MG: 10 TABLET ORAL at 20:24

## 2020-02-18 RX ADMIN — BUSPIRONE HYDROCHLORIDE 5 MG: 10 TABLET ORAL at 05:44

## 2020-02-18 RX ADMIN — CEFOTETAN DISODIUM 2 G: 2 INJECTION, POWDER, FOR SOLUTION INTRAMUSCULAR; INTRAVENOUS at 13:08

## 2020-02-18 RX ADMIN — MIDAZOLAM HYDROCHLORIDE 1 MG: 1 INJECTION, SOLUTION INTRAMUSCULAR; INTRAVENOUS at 13:09

## 2020-02-18 RX ADMIN — ROCURONIUM BROMIDE 5 MG: 10 INJECTION, SOLUTION INTRAVENOUS at 13:55

## 2020-02-18 RX ADMIN — FENTANYL CITRATE 100 MCG: 50 INJECTION, SOLUTION INTRAMUSCULAR; INTRAVENOUS at 13:23

## 2020-02-18 RX ADMIN — MORPHINE SULFATE 2 MG: 4 INJECTION INTRAVENOUS at 18:00

## 2020-02-18 RX ADMIN — FENTANYL CITRATE 25 MCG: 0.05 INJECTION, SOLUTION INTRAMUSCULAR; INTRAVENOUS at 16:43

## 2020-02-18 RX ADMIN — OMEPRAZOLE 20 MG: 20 CAPSULE, DELAYED RELEASE ORAL at 05:44

## 2020-02-18 RX ADMIN — DEXAMETHASONE SODIUM PHOSPHATE 8 MG: 4 INJECTION, SOLUTION INTRA-ARTICULAR; INTRALESIONAL; INTRAMUSCULAR; INTRAVENOUS; SOFT TISSUE at 13:08

## 2020-02-18 RX ADMIN — PROPOFOL 80 MG: 10 INJECTION, EMULSION INTRAVENOUS at 13:23

## 2020-02-18 RX ADMIN — ONDANSETRON 4 MG: 2 INJECTION INTRAMUSCULAR; INTRAVENOUS at 15:34

## 2020-02-18 RX ADMIN — OXYCODONE HYDROCHLORIDE 10 MG: 5 TABLET ORAL at 20:23

## 2020-02-18 RX ADMIN — ALBUTEROL SULFATE 2.5 MG: 2.5 SOLUTION RESPIRATORY (INHALATION) at 16:41

## 2020-02-18 RX ADMIN — BENZOCAINE AND MENTHOL 1 LOZENGE: 15; 3.6 LOZENGE ORAL at 05:44

## 2020-02-18 RX ADMIN — FENTANYL CITRATE 50 MCG: 50 INJECTION, SOLUTION INTRAMUSCULAR; INTRAVENOUS at 14:51

## 2020-02-18 RX ADMIN — GABAPENTIN 100 MG: 100 CAPSULE ORAL at 05:44

## 2020-02-18 RX ADMIN — FENTANYL CITRATE 25 MCG: 0.05 INJECTION, SOLUTION INTRAMUSCULAR; INTRAVENOUS at 17:34

## 2020-02-18 RX ADMIN — POTASSIUM CHLORIDE 40 MEQ: 1500 TABLET, EXTENDED RELEASE ORAL at 09:41

## 2020-02-18 RX ADMIN — SODIUM CHLORIDE, SODIUM GLUCONATE, SODIUM ACETATE, POTASSIUM CHLORIDE AND MAGNESIUM CHLORIDE: 526; 502; 368; 37; 30 INJECTION, SOLUTION INTRAVENOUS at 13:40

## 2020-02-18 RX ADMIN — ATORVASTATIN CALCIUM 80 MG: 80 TABLET, FILM COATED ORAL at 05:44

## 2020-02-18 RX ADMIN — MORPHINE SULFATE 2 MG: 4 INJECTION INTRAVENOUS at 17:50

## 2020-02-18 RX ADMIN — SUGAMMADEX 150 MG: 100 INJECTION, SOLUTION INTRAVENOUS at 15:53

## 2020-02-18 RX ADMIN — ROCURONIUM BROMIDE 5 MG: 10 INJECTION, SOLUTION INTRAVENOUS at 15:31

## 2020-02-18 ASSESSMENT — ENCOUNTER SYMPTOMS
MYALGIAS: 0
ABDOMINAL PAIN: 0
COUGH: 1
TINGLING: 0
DEPRESSION: 0
TREMORS: 0
CLAUDICATION: 0
SHORTNESS OF BREATH: 1
HEARTBURN: 0
SPUTUM PRODUCTION: 0
PHOTOPHOBIA: 0
DIZZINESS: 0
BLURRED VISION: 0
BACK PAIN: 0
HEADACHES: 0
EYE PAIN: 0
NAUSEA: 0
FEVER: 0
HALLUCINATIONS: 0
NECK PAIN: 0
VOMITING: 0
DOUBLE VISION: 0
PALPITATIONS: 0

## 2020-02-18 ASSESSMENT — PAIN SCALES - GENERAL: PAIN_LEVEL: 0

## 2020-02-18 NOTE — PROGRESS NOTES
Assumed care of pt, beside report received from XUAN Barros. Updated on POC, call light within reach all fall precautions within place. Bed locked and lowered. Pt instructed to call for assistance before getting up. All questions answered, no other needs at this time.

## 2020-02-18 NOTE — ANESTHESIA PROCEDURE NOTES
Peripheral IV  Date/Time: 2/18/2020 1:30 PM  Performed by: Thien Real D.O.  Authorized by: Thien Real D.O.     Size:  18 G  Laterality:  Left  Site Prep:  Alcohol  Technique:  Direct puncture  Attempts:  1

## 2020-02-18 NOTE — CARE PLAN
Problem: Communication  Goal: The ability to communicate needs accurately and effectively will improve  Outcome: PROGRESSING AS EXPECTED  Note: Pt educated on POC and medications. Pt verbalized understanding.      Problem: Safety  Goal: Will remain free from injury  Outcome: PROGRESSING AS EXPECTED  Intervention: Provide assistance with mobility  Flowsheets (Taken 2/17/2020 0745 by Fiorella Dockery R.N.)  Assistance: Assistance of One  Ambulation Tolerance:   General Weakness   Tires Quickly  Note: Pt bedside table and call-light are within reach, bed in lowest position and locked. Treaded socks on and pt has been educated on call light use and asked to call before getting up.

## 2020-02-18 NOTE — PROGRESS NOTES
[facility] Medicine Daily Progress Note    Date of Service  2/18/2020    Chief Complaint  79 y.o. female admitted 2/13/2020 with severe anemia    Interval Problem Update  Pulmonary status improved. BNP down to 1617 from 2011. K low at 3.5 which was replaced. Scheduled for OR today for colonic mass . H/H stable.     Consultants/Specialty  Dr wu gi  Surgery dr aggarwal    Code Status  full    Disposition  Home once medically cleared     Review of Systems  Review of Systems   Constitutional: Positive for malaise/fatigue. Negative for fever.   HENT: Negative for ear pain, hearing loss and tinnitus.    Eyes: Negative for blurred vision, double vision, photophobia and pain.   Respiratory: Positive for cough and shortness of breath. Negative for sputum production.    Cardiovascular: Negative for chest pain, palpitations and claudication.   Gastrointestinal: Negative for abdominal pain, heartburn, nausea and vomiting.   Genitourinary: Negative for dysuria, frequency and urgency.   Musculoskeletal: Negative for back pain, myalgias and neck pain.   Neurological: Negative for dizziness, tingling, tremors and headaches.   Psychiatric/Behavioral: Negative for depression and hallucinations.   All other systems reviewed and are negative.       Physical Exam  Temp:  [36 °C (96.8 °F)-37.1 °C (98.7 °F)] 37.1 °C (98.7 °F)  Pulse:  [61-76] 64  Resp:  [14-17] 14  BP: (111-135)/(51-62) 135/55  SpO2:  [95 %-99 %] 98 %    Physical Exam  Constitutional:       General: She is not in acute distress.     Appearance: Normal appearance. She is normal weight. She is ill-appearing.   HENT:      Head: Normocephalic and atraumatic.      Nose: No congestion.   Eyes:      General: No scleral icterus.     Extraocular Movements: Extraocular movements intact.      Pupils: Pupils are equal, round, and reactive to light.   Neck:      Musculoskeletal: Normal range of motion and neck supple.   Cardiovascular:      Rate and Rhythm: Normal rate and regular  rhythm.      Pulses: Normal pulses.      Heart sounds: Normal heart sounds.   Pulmonary:      Effort: Pulmonary effort is normal.      Breath sounds: Wheezing and rhonchi (coarse b/l rhonchi) present. No rales.   Abdominal:      General: Abdomen is flat. Bowel sounds are normal. There is no distension.      Palpations: There is no mass.      Tenderness: There is no abdominal tenderness. There is no guarding or rebound.      Hernia: No hernia is present.   Musculoskeletal: Normal range of motion.         General: No swelling, tenderness, deformity or signs of injury.      Right lower leg: Edema (1+) present.      Left lower leg: Edema (1+) present.   Skin:     General: Skin is warm.      Capillary Refill: Capillary refill takes 2 to 3 seconds.      Coloration: Skin is pale. Skin is not jaundiced.      Findings: No bruising, erythema or rash.   Neurological:      General: No focal deficit present.      Mental Status: She is alert and oriented to person, place, and time. Mental status is at baseline.      Motor: Weakness present.   Psychiatric:         Mood and Affect: Mood normal.         Fluids  No intake or output data in the 24 hours ending 02/18/20 1215    Laboratory  Recent Labs     02/15/20  2319 02/17/20  0259 02/18/20  0155   WBC  --  7.1 6.3   RBC  --  3.17* 3.42*   HEMOGLOBIN 8.9* 7.6* 8.0*   HEMATOCRIT  --  25.4* 27.5*   MCV  --  80.1* 80.4*   MCH  --  24.0* 23.4*   MCHC  --  29.9* 29.1*   RDW  --  66.7* 68.0*   PLATELETCT  --  123* 122*   MPV  --  11.0 10.3     Recent Labs     02/16/20  0230 02/17/20  0259 02/18/20  0155   SODIUM 140 141 137   POTASSIUM 3.2* 3.3* 3.5*   CHLORIDE 106 107 100   CO2 25 28 31   GLUCOSE 100* 104* 97   BUN 13 8 8   CREATININE 0.55 0.54 0.47*   CALCIUM 8.2* 8.0* 7.8*                   Imaging  DX-CHEST-PORTABLE (1 VIEW)   Final Result      1.  Worsening bibasilar consolidation and/or pleural fluid, particularly in the LEFT.   2.  Minimal interval improvement of pulmonary edema.    3.  No other significant change from prior exam.         EC-ECHOCARDIOGRAM COMPLETE W/O CONT   Final Result      DX-CHEST-2 VIEWS   Final Result      1.  Bilateral interstitial opacities consistent with pulmonary edema. Small bilateral pleural effusions.   2.  Mild cardiac silhouette enlargement.   3.  Emphysematous changes.           Assessment/Plan  * GIB (gastrointestinal bleeding)- (present on admission)  Assessment & Plan  Patient with a prior history of previous GI bleed last fall.  Records from outside facility indicates that an occult stool was done in the emergency room and was positive.  She currently is now status post 2 units packed red blood cell transfusion   - Colonoscopy --> right colon mass  - Iv PPI to po on 2/15. Continue     Anemia requiring transfusions- (present on admission)  Assessment & Plan  Received 2 units PRBCs yesterday.   H/H stable today.  Monitor    Colonic mass- (present on admission)  Assessment & Plan  Surgery dr aggarwal consulted   Planned surgery today 2/18    Hypokalemia  Assessment & Plan  Replaced po  Monitor on BMP    Acute pulmonary edema (HCC)  Assessment & Plan  Improved this morning  Lasix 40mg x 1 orderd on 2/15 and 2/16  Echo-no acute findings  On 2/17- cxr reviewed--> iv lasix 40mg iv q8 x 3 doses then reassess    Pulmonary emphysema (HCC)- (present on admission)  Assessment & Plan  Not in acute exacerbation  Oxygen  Bronchodilators prn    HTN (hypertension)- (present on admission)  Assessment & Plan  Home lisinopril and lopressor held for allow BP room for effective diuresis  .    PAD (peripheral artery disease) (HCC)- (present on admission)  Assessment & Plan  Patient states that she underwent angioplasty in December with Dr. Mohsin of vascular surgery.  She has been taking aspirin and Plavix in addition to her Lipitor.  Holding both aspirin and Plavix for now, okay to continue home Lipitor.       VTE prophylaxis: scd only      I have performed a physical exam and  reviewed and updated ROS and Plan today (2/18/2020). In review of yesterday's note (2/17/2020), there are no changes except as documented above.

## 2020-02-18 NOTE — ANESTHESIA PREPROCEDURE EVALUATION
Relevant Problems   PULMONARY   (+) Pulmonary emphysema (HCC)      CARDIAC   (+) HTN (hypertension)   (+) PAD (peripheral artery disease) (HCC)       Physical Exam    Airway   Mallampati: II  TM distance: >3 FB  Neck ROM: full       Cardiovascular - normal exam  Rhythm: regular  Rate: normal  (-) murmur     Dental - normal exam         Pulmonary - normal exam  Breath sounds clear to auscultation     Abdominal    Neurological - normal exam                 Anesthesia Plan    ASA 3   ASA physical status 3 criteria: COPD    Plan - general       Airway plan will be ETT        Induction: intravenous    Postoperative Plan: Postoperative administration of opioids is intended.    Pertinent diagnostic labs and testing reviewed    Informed Consent:    Anesthetic plan and risks discussed with patient.    Use of blood products discussed with: patient whom consented to blood products.

## 2020-02-18 NOTE — ANESTHESIA PROCEDURE NOTES
Airway  Date/Time: 2/18/2020 1:23 PM  Performed by: Thien Real D.O.  Authorized by: Thien Real D.O.     Location:  OR  Urgency:  Elective  Indications for Airway Management:  Anesthesia  Spontaneous Ventilation: absent    Sedation Level:  Deep  Preoxygenated: Yes    Patient Position:  Sniffing  Mask Difficulty Assessment:  2 - vent by mask + OA or adjuvant +/- NMBA  Final Airway Type:  Endotracheal airway  Final Endotracheal Airway:  ETT  Cuffed: Yes    Technique Used for Successful ETT Placement:  Direct laryngoscopy  Insertion Site:  Oral  Blade Type:  Erin  Laryngoscope Blade/Videolaryngoscope Blade Size:  3  ETT Size (mm):  7.0  Measured from:  Lips  ETT to Lips (cm):  21  Placement Verified by: auscultation and capnometry    Cormack-Lehane Classification:  Grade IIa - partial view of glottis  Number of Attempts at Approach:  1

## 2020-02-19 LAB
ANION GAP SERPL CALC-SCNC: 10 MMOL/L (ref 0–11.9)
BUN SERPL-MCNC: 15 MG/DL (ref 8–22)
CALCIUM SERPL-MCNC: 8.1 MG/DL (ref 8.5–10.5)
CHLORIDE SERPL-SCNC: 102 MMOL/L (ref 96–112)
CO2 SERPL-SCNC: 29 MMOL/L (ref 20–33)
CREAT SERPL-MCNC: 0.81 MG/DL (ref 0.5–1.4)
ERYTHROCYTE [DISTWIDTH] IN BLOOD BY AUTOMATED COUNT: 70 FL (ref 35.9–50)
GLUCOSE SERPL-MCNC: 110 MG/DL (ref 65–99)
HCT VFR BLD AUTO: 28.2 % (ref 37–47)
HGB BLD-MCNC: 8.2 G/DL (ref 12–16)
MCH RBC QN AUTO: 23.5 PG (ref 27–33)
MCHC RBC AUTO-ENTMCNC: 29.1 G/DL (ref 33.6–35)
MCV RBC AUTO: 80.8 FL (ref 81.4–97.8)
PATHOLOGY CONSULT NOTE: NORMAL
PLATELET # BLD AUTO: 115 K/UL (ref 164–446)
PMV BLD AUTO: 9.7 FL (ref 9–12.9)
POTASSIUM SERPL-SCNC: 4.2 MMOL/L (ref 3.6–5.5)
RBC # BLD AUTO: 3.49 M/UL (ref 4.2–5.4)
SODIUM SERPL-SCNC: 141 MMOL/L (ref 135–145)
WBC # BLD AUTO: 17.4 K/UL (ref 4.8–10.8)

## 2020-02-19 PROCEDURE — 51798 US URINE CAPACITY MEASURE: CPT

## 2020-02-19 PROCEDURE — 94669 MECHANICAL CHEST WALL OSCILL: CPT

## 2020-02-19 PROCEDURE — 36415 COLL VENOUS BLD VENIPUNCTURE: CPT

## 2020-02-19 PROCEDURE — 80048 BASIC METABOLIC PNL TOTAL CA: CPT

## 2020-02-19 PROCEDURE — A9270 NON-COVERED ITEM OR SERVICE: HCPCS | Performed by: HOSPITALIST

## 2020-02-19 PROCEDURE — 99232 SBSQ HOSP IP/OBS MODERATE 35: CPT | Performed by: HOSPITALIST

## 2020-02-19 PROCEDURE — 700105 HCHG RX REV CODE 258: Performed by: HOSPITALIST

## 2020-02-19 PROCEDURE — 700102 HCHG RX REV CODE 250 W/ 637 OVERRIDE(OP): Performed by: HOSPITALIST

## 2020-02-19 PROCEDURE — 770020 HCHG ROOM/CARE - TELE (206)

## 2020-02-19 PROCEDURE — 85027 COMPLETE CBC AUTOMATED: CPT

## 2020-02-19 RX ORDER — GUAIFENESIN 600 MG/1
600 TABLET, EXTENDED RELEASE ORAL EVERY 12 HOURS
Status: DISCONTINUED | OUTPATIENT
Start: 2020-02-19 | End: 2020-02-24 | Stop reason: HOSPADM

## 2020-02-19 RX ORDER — SODIUM CHLORIDE 9 MG/ML
INJECTION, SOLUTION INTRAVENOUS ONCE
Status: COMPLETED | OUTPATIENT
Start: 2020-02-19 | End: 2020-02-19

## 2020-02-19 RX ADMIN — GUAIFENESIN 600 MG: 600 TABLET, EXTENDED RELEASE ORAL at 16:52

## 2020-02-19 RX ADMIN — BUSPIRONE HYDROCHLORIDE 5 MG: 10 TABLET ORAL at 04:41

## 2020-02-19 RX ADMIN — BENZOCAINE AND MENTHOL 1 LOZENGE: 15; 3.6 LOZENGE ORAL at 20:05

## 2020-02-19 RX ADMIN — SENNOSIDES AND DOCUSATE SODIUM 2 TABLET: 8.6; 5 TABLET ORAL at 16:52

## 2020-02-19 RX ADMIN — OMEPRAZOLE 20 MG: 20 CAPSULE, DELAYED RELEASE ORAL at 04:41

## 2020-02-19 RX ADMIN — ACETAMINOPHEN 650 MG: 325 TABLET, FILM COATED ORAL at 03:30

## 2020-02-19 RX ADMIN — SENNOSIDES AND DOCUSATE SODIUM 2 TABLET: 8.6; 5 TABLET ORAL at 04:41

## 2020-02-19 RX ADMIN — GABAPENTIN 100 MG: 100 CAPSULE ORAL at 16:52

## 2020-02-19 RX ADMIN — GABAPENTIN 100 MG: 100 CAPSULE ORAL at 13:08

## 2020-02-19 RX ADMIN — BUSPIRONE HYDROCHLORIDE 5 MG: 10 TABLET ORAL at 20:05

## 2020-02-19 RX ADMIN — GABAPENTIN 100 MG: 100 CAPSULE ORAL at 04:41

## 2020-02-19 RX ADMIN — SODIUM CHLORIDE: 9 INJECTION, SOLUTION INTRAVENOUS at 10:30

## 2020-02-19 RX ADMIN — ATORVASTATIN CALCIUM 80 MG: 80 TABLET, FILM COATED ORAL at 04:41

## 2020-02-19 RX ADMIN — BUSPIRONE HYDROCHLORIDE 5 MG: 10 TABLET ORAL at 13:08

## 2020-02-19 ASSESSMENT — ENCOUNTER SYMPTOMS
DEPRESSION: 0
EYE PAIN: 0
HEADACHES: 0
ABDOMINAL PAIN: 0
ABDOMINAL PAIN: 1
TINGLING: 0
DOUBLE VISION: 0
HEARTBURN: 0
SPUTUM PRODUCTION: 0
BLURRED VISION: 0
PALPITATIONS: 0
MYALGIAS: 0
SHORTNESS OF BREATH: 1
CHILLS: 0
PHOTOPHOBIA: 0
HALLUCINATIONS: 0
BACK PAIN: 0
TREMORS: 0
VOMITING: 0
FEVER: 0
NECK PAIN: 0
COUGH: 1
DIZZINESS: 0
CLAUDICATION: 0
NAUSEA: 0
SENSORY CHANGE: 0

## 2020-02-19 NOTE — PROGRESS NOTES
Trauma / Surgical Daily Progress Note    Date of Service  2/19/2020    Chief Complaint  79 y.o. female admitted 2/13/2020 with GI Bleed    Interval Events  POD #1 right hemicolectomy   Pain control adequate  Tolerating full liquid diet   IS weak    - Add PEP therapy  - Encourage increase mobilization  - Continuous pulse ox    Review of Systems  Review of Systems   Constitutional: Negative for chills and fever.   Respiratory: Positive for cough and shortness of breath.    Cardiovascular: Negative for chest pain and palpitations.   Gastrointestinal: Positive for abdominal pain. Negative for nausea and vomiting.   Neurological: Negative for sensory change.        Vital Signs  Temp:  [36.7 °C (98.1 °F)-38.1 °C (100.5 °F)] 37.3 °C (99.2 °F)  Pulse:  [64-91] 91  Resp:  [13-20] 16  BP: ()/(32-56) 97/43  SpO2:  [92 %-100 %] 93 %    Physical Exam  Physical Exam  Vitals signs and nursing note reviewed.   HENT:      Head:      Comments: Left facial ecchymosis   Eyes:      Conjunctiva/sclera: Conjunctivae normal.   Cardiovascular:      Rate and Rhythm: Normal rate and regular rhythm.      Pulses: Normal pulses.   Pulmonary:      Effort: Pulmonary effort is normal. No respiratory distress.      Comments: -500  Abdominal:      Tenderness: There is abdominal tenderness (incisional).      Comments: Midline surgical dressing in place   Musculoskeletal:      Comments: Moves all extremities   Skin:     General: Skin is warm and dry.   Neurological:      Mental Status: She is alert and oriented to person, place, and time.         Laboratory  Recent Results (from the past 24 hour(s))   CBC WITHOUT DIFFERENTIAL    Collection Time: 02/19/20  2:55 AM   Result Value Ref Range    WBC 17.4 (H) 4.8 - 10.8 K/uL    RBC 3.49 (L) 4.20 - 5.40 M/uL    Hemoglobin 8.2 (L) 12.0 - 16.0 g/dL    Hematocrit 28.2 (L) 37.0 - 47.0 %    MCV 80.8 (L) 81.4 - 97.8 fL    MCH 23.5 (L) 27.0 - 33.0 pg    MCHC 29.1 (L) 33.6 - 35.0 g/dL    RDW 70.0 (H) 35.9  - 50.0 fL    Platelet Count 115 (L) 164 - 446 K/uL    MPV 9.7 9.0 - 12.9 fL   Basic Metabolic Panel    Collection Time: 02/19/20  2:55 AM   Result Value Ref Range    Sodium 141 135 - 145 mmol/L    Potassium 4.2 3.6 - 5.5 mmol/L    Chloride 102 96 - 112 mmol/L    Co2 29 20 - 33 mmol/L    Glucose 110 (H) 65 - 99 mg/dL    Bun 15 8 - 22 mg/dL    Creatinine 0.81 0.50 - 1.40 mg/dL    Calcium 8.1 (L) 8.5 - 10.5 mg/dL    Anion Gap 10.0 0.0 - 11.9   ESTIMATED GFR    Collection Time: 02/19/20  2:55 AM   Result Value Ref Range    GFR If African American >60 >60 mL/min/1.73 m 2    GFR If Non African American >60 >60 mL/min/1.73 m 2   Histology Request    Collection Time: 02/19/20  7:41 AM   Result Value Ref Range    Pathology Request Sent to [facility]        Fluids    Intake/Output Summary (Last 24 hours) at 2/19/2020 1107  Last data filed at 2/19/2020 0600  Gross per 24 hour   Intake 1442.5 ml   Output 190 ml   Net 1252.5 ml       Core Measures & Quality Metrics  Labs reviewed, Medications reviewed and Radiology images reviewed  Guzmán catheter: No Guzmán                  DONALD Score  ETOH Screening    Assessment/Plan  Colonic mass- (present on admission)  Assessment & Plan  2/18 Right hemicolectomy  Wes Pat MD.  General surgery.        Discussed patient condition with RN, Patient and general surgery, Dr. Pat.

## 2020-02-19 NOTE — PROGRESS NOTES
Patient returned to the unit from PACU, monitor placed, SR 82. Night RN to bedside for report. Call light within reach, bed in low and locked position, spouse at bedside.

## 2020-02-19 NOTE — CARE PLAN
Problem: Safety  Goal: Will remain free from falls  Outcome: PROGRESSING AS EXPECTED  Note: Bed alarmed,  at bedside, call bell in reach.      Problem: Infection  Goal: Will remain free from infection  Outcome: PROGRESSING AS EXPECTED  Note: Abd dressing cdi.      Problem: Bowel/Gastric:  Goal: Will not experience complications related to bowel motility  Outcome: PROGRESSING AS EXPECTED  Note: Hypoactive BS, tolerating sips of water and juice.      Problem: Knowledge Deficit  Goal: Knowledge of the prescribed therapeutic regimen will improve  Outcome: PROGRESSING AS EXPECTED  Note: Care plan reviewed.      Problem: Pain Management  Goal: Pain level will decrease to patient's comfort goal  Outcome: PROGRESSING AS EXPECTED  Note: Abd pain 8/10 given tylenol and 5mg oxycodone with good effect.

## 2020-02-19 NOTE — ANESTHESIA TIME REPORT
Anesthesia Start and Stop Event Times     Date Time Event    2/18/2020 0804 Ready for Procedure     1308 Anesthesia Start        Responsible Staff  02/18/20    Name Role Begin End    Thien Real D.O. Anesth 1308         Preop Diagnosis (Free Text):  Pre-op Diagnosis     COLONIC MASS        Preop Diagnosis (Codes):    Post op Diagnosis  Colonic mass      Premium Reason  A. 3PM - 7AM    Comments:

## 2020-02-19 NOTE — OR NURSING
Pt's responding well to 500cc bolus. Last YM=238/48 at 1830. Pt has adequate urine output.    Pt calm and sleeping at this time. Otherwise, Pt AA/Ox4. VSS. Dressing to abdomen, CDI. Pt reports moderate to severe pain to her abdomen when awake. Pain medications given. Ice pack applied to abdomen. Pt denies nausea or vomiting. Pt denies numbness or tingling at this time. SCDs in place. Intact alcantara catheter, drained 100cc yellow urine.    Report given to XUAN Duke. Pt's  at bedside and updated regarding plan of care.    Awaiting transport.

## 2020-02-19 NOTE — OR SURGEON
Immediate Post OP Note    PreOp Diagnosis: right colon mass    PostOp Diagnosis: right colon mass    Procedure(s):  RIGHT BRADY COLECTOMY, ROBOT-ASSISTED, USING DA LON XI - Wound Class: Clean Contaminated    Surgeon(s):  GUZMAN Lord M.D.    Anesthesiologist/Type of Anesthesia:  Anesthesiologist: Thien Real D.O./General    Surgical Staff:  Circulator: Gini Rowe R.N.  Relief Scrub: Roslyn Wilson  Scrub Person: Caleb Saunders    Specimens removed if any:  ID Type Source Tests Collected by Time Destination   A : RIGHT COLON - TERMINAL ILEUM Tissue Colon PATHOLOGY SPECIMEN Wes Pat M.D. 2/18/2020  3:50 PM        Estimated Blood Loss: 75 cc    Findings: tattoo seen in distal right colon, adhesions    Complications: none        2/18/2020 4:14 PM Wes Pat M.D.

## 2020-02-19 NOTE — PROGRESS NOTES
Assumed care at 1900, bedside report received from Yasmeen GOVEA. Pt. Is NSR on the monitor. Initial assessment completed, orders reviewed, call light within reach, bed alarm  in use, and hourly rounding in place. POC addressed with patient, no additional questions at this time.

## 2020-02-19 NOTE — PROGRESS NOTES
Hospital Medicine Daily Progress Note    Date of Service  2/19/2020    Chief Complaint  79 y.o. female admitted 2/13/2020 with severe anemia    Interval Problem Update  2/18: Pulmonary status improved. BNP down to 1617 from 2011. K low at 3.5 which was replaced. Scheduled for OR today for colonic mass . H/H stable.   2/19: S/p R-hemicolectomy. H/H stable. Low BP this morning, but improved after IVFB. No clinical evidence of bleeding.     Consultants/Specialty  Dr wu gi  Surgery dr aggarwal    Code Status  FULL code     Disposition  Continue IP , discharge home once medically cleared     Review of Systems  Review of Systems   Constitutional: Negative for fever and malaise/fatigue.   HENT: Negative for ear pain, hearing loss and tinnitus.    Eyes: Negative for blurred vision, double vision, photophobia and pain.   Respiratory: Positive for cough and shortness of breath. Negative for sputum production.    Cardiovascular: Negative for chest pain, palpitations and claudication.   Gastrointestinal: Negative for abdominal pain, heartburn, nausea and vomiting.   Genitourinary: Negative for dysuria, frequency and urgency.   Musculoskeletal: Negative for back pain, myalgias and neck pain.   Neurological: Negative for dizziness, tingling, tremors and headaches.   Psychiatric/Behavioral: Negative for depression and hallucinations.   All other systems reviewed and are negative.       Physical Exam  Temp:  [36.7 °C (98.1 °F)-38.1 °C (100.5 °F)] 37.3 °C (99.2 °F)  Pulse:  [64-91] 91  Resp:  [13-20] 16  BP: ()/(32-56) 97/43  SpO2:  [92 %-100 %] 93 %    Physical Exam  Vitals signs and nursing note reviewed.   Constitutional:       General: She is not in acute distress.     Appearance: Normal appearance. She is normal weight. She is ill-appearing. She is not toxic-appearing or diaphoretic.   HENT:      Head: Normocephalic and atraumatic.      Nose: No congestion.   Eyes:      General: No scleral icterus.     Extraocular  Movements: Extraocular movements intact.      Pupils: Pupils are equal, round, and reactive to light.   Neck:      Musculoskeletal: Normal range of motion and neck supple.   Cardiovascular:      Rate and Rhythm: Normal rate and regular rhythm.      Pulses: Normal pulses.      Heart sounds: Normal heart sounds.   Pulmonary:      Effort: Pulmonary effort is normal. No respiratory distress.      Breath sounds: No stridor. Rhonchi (bilaterally ) present. No wheezing or rales.   Chest:      Chest wall: No tenderness.   Abdominal:      General: Abdomen is flat. Bowel sounds are normal. There is no distension.      Palpations: Abdomen is soft. There is no mass.      Tenderness: There is no abdominal tenderness. There is no guarding or rebound.      Hernia: No hernia is present.   Musculoskeletal: Normal range of motion.         General: No swelling, tenderness, deformity or signs of injury.      Right lower leg: No edema.      Left lower leg: No edema.   Skin:     General: Skin is warm.      Coloration: Skin is pale. Skin is not jaundiced.      Findings: No bruising, erythema or rash.      Comments: S/p Right hemicolectomy, dressing c/d/i    Neurological:      General: No focal deficit present.      Mental Status: She is alert and oriented to person, place, and time. Mental status is at baseline.      Motor: Weakness present.   Psychiatric:         Mood and Affect: Mood normal.         Fluids    Intake/Output Summary (Last 24 hours) at 2/19/2020 1122  Last data filed at 2/19/2020 0830  Gross per 24 hour   Intake 1552.5 ml   Output 190 ml   Net 1362.5 ml       Laboratory  Recent Labs     02/17/20 0259 02/18/20 0155 02/19/20 0255   WBC 7.1 6.3 17.4*   RBC 3.17* 3.42* 3.49*   HEMOGLOBIN 7.6* 8.0* 8.2*   HEMATOCRIT 25.4* 27.5* 28.2*   MCV 80.1* 80.4* 80.8*   MCH 24.0* 23.4* 23.5*   MCHC 29.9* 29.1* 29.1*   RDW 66.7* 68.0* 70.0*   PLATELETCT 123* 122* 115*   MPV 11.0 10.3 9.7     Recent Labs     02/17/20 0259 02/18/20 0155  02/19/20  0255   SODIUM 141 137 141   POTASSIUM 3.3* 3.5* 4.2   CHLORIDE 107 100 102   CO2 28 31 29   GLUCOSE 104* 97 110*   BUN 8 8 15   CREATININE 0.54 0.47* 0.81   CALCIUM 8.0* 7.8* 8.1*                   Imaging  DX-CHEST-PORTABLE (1 VIEW)   Final Result      1.  Worsening bibasilar consolidation and/or pleural fluid, particularly in the LEFT.   2.  Minimal interval improvement of pulmonary edema.   3.  No other significant change from prior exam.         EC-ECHOCARDIOGRAM COMPLETE W/O CONT   Final Result      DX-CHEST-2 VIEWS   Final Result      1.  Bilateral interstitial opacities consistent with pulmonary edema. Small bilateral pleural effusions.   2.  Mild cardiac silhouette enlargement.   3.  Emphysematous changes.           Assessment/Plan  * GIB (gastrointestinal bleeding)- (present on admission)  Assessment & Plan  Patient with a prior history of previous GI bleed last fall.  Records from outside facility indicates that an occult stool was done in the emergency room and was positive.   - Colonoscopy --> right colon mass  - Iv PPI to po on 2/15. Continue PO   - H/H stable and without clinical evidence of bleeding     Colonic mass- (present on admission)  Assessment & Plan  Surgery dr aggarwal consulted   S/p right hemicolectomy on 2/18   Continue liquid diet as tolerated    Anemia requiring transfusions- (present on admission)  Assessment & Plan  Received 2 units PRBCs 2/17.   H/H stable today.  Monitor    Hypokalemia  Assessment & Plan  Improved   Continue to monitor on BMP    Acute pulmonary edema (HCC)  Assessment & Plan  Improving, continue to monitor.  Lasix 40mg x 1 orderd on 2/15 and 2/16  On 2/17- cxr reviewed--> iv lasix 40mg iv q8 x 3 doses   Echo-no acute findings  Holding diuresis d/t low Bps   Will check BNP tomorrow     Pulmonary emphysema (HCC)- (present on admission)  Assessment & Plan  Not in acute exacerbation  Currently requiring supplemental o2, attempt to wean as tolerated. If patient  cannot be weaned, will will obtain walking o2 sats closer to discharge.   Continue Bronchodilators prn  Encouraging mobility and IS   PEP therapy added     HTN (hypertension)- (present on admission)  Assessment & Plan  Home lisinopril and lopressor held for allow BP room for effective diuresis  With hypotension this morning, which improved with IVFB    Continue to monitor Bps     PAD (peripheral artery disease) (HCC)- (present on admission)  Assessment & Plan  Patient states that she underwent angioplasty in December with Dr. Mohsin of vascular surgery.  She has been taking aspirin and Plavix in addition to her Lipitor.  Holding both aspirin and Plavix for now, okay to continue home Lipitor.       VTE prophylaxis: scd only      I have performed a physical exam and reviewed and updated ROS and Plan today (2/19/2020). In review of yesterday's note (2/18/2020), there are no changes except as documented above.

## 2020-02-19 NOTE — OP REPORT
DATE OF SERVICE:  02/18/2020    SURGEON:  Wes Pat MD    ASSISTANT:  Quinn Mitchell MD    PREOPERATIVE DIAGNOSIS:  Right colon mass.    POSTOPERATIVE DIAGNOSIS:  Right colon mass.    PROCEDURE PERFORMED:  Robotic-assisted laparoscopic right hemicolectomy.    ANESTHESIA:  General endotracheal anesthesia.    ANESTHESIOLOGIST:  Thien Real DO    INDICATIONS:  An 79-year-old woman who was noted to be anemic.  Colonoscopy   did reveal a mass in her right colon.  A hemicolectomy was, therefore,   indicated.    DESCRIPTION OF PROCEDURE:  The procedure was discussed in detail with the   patient including the use of surgical robot, potential for converting to an   open procedure and the associated risk of bleeding, infection, abscess, and   hematoma.  I also discussed risk of anastomotic leak, anastomotic stricture,   and significance of these complications including potential for requiring a   second operation and an ostomy.  I also discussed risk of injury to   intraperitoneal organs or retroperitoneal organs.  She understood all the   above and wished to proceed.  She was placed under anesthesia by Dr. Real.    Abdomen was prepped and draped with chlorhexidine prep and sterile drapes.    After a time-out, a left subcostal incision was made and through this   incision, Veress needle was placed.  Low pressure was confirmed and CO2   insufflation was used to achieve a pneumoperitoneum of 50 mmHg.  Through the   same incision, an 8 mm da Valerie port was placed.  Under direct visualization,   3 more da Valerie ports were placed on the left side of the abdomen.  Robot was   docked and instruments were placed.  The tattooing was readily identified and   it was essentially near the hepatic flexure.  There were significant adhesions   from the patient's previous perforated appendix and these were lysed.  Once   appropriate exposure was obtained, the colon was retracted superiorly and   laterally.  The mesentery to  the colon was taken down near the base.  The   ileocolic and right colic vessels were skeletonized and clipped, relatively   proximal.  Mesentery was taken down from the area around the cecum towards the   hepatic flexure.  During this dissection, the duodenum was identified and   protected.  Dissection then proceeded onto the proximal transverse colon well   distal to the tattooing.  Once this mesentery had been taken down, the   transverse colon was divided after clearing the omentum off.  This was done   with a AB stapler.  Subsequent to this, the terminal ileum was divided again   with a AB stapler.  The remaining attachments to the colon including lateral   peritoneal attachments and some hepatic flexure attachments as well as some   residual mesenteric attachments were then removed and the specimen was   completely mobile and free.  It was placed over the liver.  The ileum and the   transverse colon were noted to line up nicely without any tension.  A stitch   was placed to hold in this configuration.  Enterotomies were then made   proximal to the staple lines and a AB stapler was passed into each limb of   the bowel, clamped and fired.  This resulted in a nicely patent anastomosis.    The remaining enterotomy was then sewn shut with a running Stratafix suture   and further buttressed with 3-0 Vicryl sutures.  The anastomosis was noted to   be tension free, well perfused and widely patent.  Hemostasis was assured.    Peritoneal cavity was irrigated copiously and irrigation was removed.  The   specimen, which was essentially the entire right colon and some transverse   proximal colon was removed by enlarging the stapler incision, which was a 12   mm incision.  This was slightly enlarged and a wound protector was used.  A   closing tray was used as well.  The specimen was removed in its entirety.  The   fascia was then approximated with #1 Vicryl sutures.  The skin was then   stapled.  Sterile dressings were  placed.  The patient tolerated the procedure   well without apparent complication.  Final counts were reported as correct. Wound class is class 3.       ____________________________________     MD SADA WILKINS / SHRUTHI    DD:  02/18/2020 16:12:59  DT:  02/18/2020 16:37:52    D#:  [id number]  Job#:  [id number]    cc: Jc Rolon MD

## 2020-02-19 NOTE — ANESTHESIA POSTPROCEDURE EVALUATION
Patient: Tameka Montanez    Procedure Summary     Date:  02/18/20 Room / Location:  [place] [address] / SURGERY [facility]    Anesthesia Start:  1308 Anesthesia Stop:      Procedure:  RIGHT BRADY COLECTOMY, ROBOT-ASSISTED, USING DA LON XI (Abdomen) Diagnosis:  (COLONIC MASS)    Surgeon:  Wes Pat M.D. Responsible Provider:  Thien Real D.O.    Anesthesia Type:  general ASA Status:  3          Final Anesthesia Type: general  Last vitals  BP   Blood Pressure : 146/52    Temp   37.1 °C (98.8 °F)    Pulse   Pulse: 75   Resp   19    SpO2   94 %      Anesthesia Post Evaluation    Patient location during evaluation: PACU  Patient participation: complete - patient participated  Level of consciousness: awake and alert  Pain score: 0    Airway patency: patent  Anesthetic complications: no  Cardiovascular status: hemodynamically stable  Respiratory status: acceptable  Hydration status: euvolemic    PONV: none           Nurse Pain Score: 0 (NPRS)

## 2020-02-19 NOTE — OR NURSING
1805 Notified Dr. Real, anesthesiologist, regarding Pt's low BP=94/37 at 1800. MD ordered 500cc bolus and may repeat another 500cc bolus if DBP still less than 40. Will continue to monitor Pt closely.

## 2020-02-20 LAB
ANION GAP SERPL CALC-SCNC: 7 MMOL/L (ref 0–11.9)
BUN SERPL-MCNC: 17 MG/DL (ref 8–22)
CALCIUM SERPL-MCNC: 8.1 MG/DL (ref 8.5–10.5)
CHLORIDE SERPL-SCNC: 99 MMOL/L (ref 96–112)
CO2 SERPL-SCNC: 28 MMOL/L (ref 20–33)
CREAT SERPL-MCNC: 0.7 MG/DL (ref 0.5–1.4)
ERYTHROCYTE [DISTWIDTH] IN BLOOD BY AUTOMATED COUNT: 68.3 FL (ref 35.9–50)
ERYTHROCYTE [DISTWIDTH] IN BLOOD BY AUTOMATED COUNT: 69.7 FL (ref 35.9–50)
GLUCOSE SERPL-MCNC: 113 MG/DL (ref 65–99)
HCT VFR BLD AUTO: 23.7 % (ref 37–47)
HCT VFR BLD AUTO: 26.4 % (ref 37–47)
HGB BLD-MCNC: 7.1 G/DL (ref 12–16)
HGB BLD-MCNC: 7.6 G/DL (ref 12–16)
IRON SATN MFR SERPL: 3 % (ref 15–55)
IRON SERPL-MCNC: 12 UG/DL (ref 40–170)
MCH RBC QN AUTO: 23.5 PG (ref 27–33)
MCH RBC QN AUTO: 23.9 PG (ref 27–33)
MCHC RBC AUTO-ENTMCNC: 28.8 G/DL (ref 33.6–35)
MCHC RBC AUTO-ENTMCNC: 30 G/DL (ref 33.6–35)
MCV RBC AUTO: 79.8 FL (ref 81.4–97.8)
MCV RBC AUTO: 81.7 FL (ref 81.4–97.8)
NT-PROBNP SERPL IA-MCNC: 1670 PG/ML (ref 0–125)
PLATELET # BLD AUTO: 115 K/UL (ref 164–446)
PLATELET # BLD AUTO: 99 K/UL (ref 164–446)
PMV BLD AUTO: 10.8 FL (ref 9–12.9)
PMV BLD AUTO: 9.8 FL (ref 9–12.9)
POTASSIUM SERPL-SCNC: 3.4 MMOL/L (ref 3.6–5.5)
RBC # BLD AUTO: 2.97 M/UL (ref 4.2–5.4)
RBC # BLD AUTO: 3.23 M/UL (ref 4.2–5.4)
SODIUM SERPL-SCNC: 134 MMOL/L (ref 135–145)
TIBC SERPL-MCNC: 351 UG/DL (ref 250–450)
WBC # BLD AUTO: 11.4 K/UL (ref 4.8–10.8)
WBC # BLD AUTO: 11.7 K/UL (ref 4.8–10.8)

## 2020-02-20 PROCEDURE — A9270 NON-COVERED ITEM OR SERVICE: HCPCS | Performed by: HOSPITALIST

## 2020-02-20 PROCEDURE — 36415 COLL VENOUS BLD VENIPUNCTURE: CPT

## 2020-02-20 PROCEDURE — 700102 HCHG RX REV CODE 250 W/ 637 OVERRIDE(OP): Performed by: HOSPITALIST

## 2020-02-20 PROCEDURE — 83880 ASSAY OF NATRIURETIC PEPTIDE: CPT

## 2020-02-20 PROCEDURE — 770020 HCHG ROOM/CARE - TELE (206)

## 2020-02-20 PROCEDURE — 99232 SBSQ HOSP IP/OBS MODERATE 35: CPT | Performed by: HOSPITALIST

## 2020-02-20 PROCEDURE — 83550 IRON BINDING TEST: CPT

## 2020-02-20 PROCEDURE — 85027 COMPLETE CBC AUTOMATED: CPT | Mod: 91

## 2020-02-20 PROCEDURE — 80048 BASIC METABOLIC PNL TOTAL CA: CPT

## 2020-02-20 PROCEDURE — 83540 ASSAY OF IRON: CPT

## 2020-02-20 PROCEDURE — A9270 NON-COVERED ITEM OR SERVICE: HCPCS | Performed by: NURSE PRACTITIONER

## 2020-02-20 PROCEDURE — 700102 HCHG RX REV CODE 250 W/ 637 OVERRIDE(OP): Performed by: NURSE PRACTITIONER

## 2020-02-20 RX ORDER — SIMETHICONE 80 MG
80 TABLET,CHEWABLE ORAL 3 TIMES DAILY PRN
Status: DISCONTINUED | OUTPATIENT
Start: 2020-02-20 | End: 2020-02-24 | Stop reason: HOSPADM

## 2020-02-20 RX ORDER — POTASSIUM CHLORIDE 20 MEQ/1
40 TABLET, EXTENDED RELEASE ORAL ONCE
Status: COMPLETED | OUTPATIENT
Start: 2020-02-20 | End: 2020-02-20

## 2020-02-20 RX ADMIN — BUSPIRONE HYDROCHLORIDE 5 MG: 10 TABLET ORAL at 20:26

## 2020-02-20 RX ADMIN — BENZOCAINE AND MENTHOL 1 LOZENGE: 15; 3.6 LOZENGE ORAL at 22:06

## 2020-02-20 RX ADMIN — OMEPRAZOLE 20 MG: 20 CAPSULE, DELAYED RELEASE ORAL at 04:08

## 2020-02-20 RX ADMIN — POTASSIUM CHLORIDE 40 MEQ: 1500 TABLET, EXTENDED RELEASE ORAL at 08:01

## 2020-02-20 RX ADMIN — BUSPIRONE HYDROCHLORIDE 5 MG: 10 TABLET ORAL at 13:42

## 2020-02-20 RX ADMIN — GABAPENTIN 100 MG: 100 CAPSULE ORAL at 04:07

## 2020-02-20 RX ADMIN — GUAIFENESIN 600 MG: 600 TABLET, EXTENDED RELEASE ORAL at 16:30

## 2020-02-20 RX ADMIN — BUSPIRONE HYDROCHLORIDE 5 MG: 10 TABLET ORAL at 04:08

## 2020-02-20 RX ADMIN — BENZOCAINE AND MENTHOL 1 LOZENGE: 15; 3.6 LOZENGE ORAL at 04:07

## 2020-02-20 RX ADMIN — GABAPENTIN 100 MG: 100 CAPSULE ORAL at 16:30

## 2020-02-20 RX ADMIN — ACETAMINOPHEN 650 MG: 325 TABLET, FILM COATED ORAL at 08:01

## 2020-02-20 RX ADMIN — GUAIFENESIN 600 MG: 600 TABLET, EXTENDED RELEASE ORAL at 04:07

## 2020-02-20 RX ADMIN — GABAPENTIN 100 MG: 100 CAPSULE ORAL at 11:37

## 2020-02-20 RX ADMIN — ATORVASTATIN CALCIUM 80 MG: 80 TABLET, FILM COATED ORAL at 04:08

## 2020-02-20 RX ADMIN — SENNOSIDES AND DOCUSATE SODIUM 2 TABLET: 8.6; 5 TABLET ORAL at 04:07

## 2020-02-20 ASSESSMENT — ENCOUNTER SYMPTOMS
PALPITATIONS: 0
DEPRESSION: 0
DIZZINESS: 0
ABDOMINAL PAIN: 1
PHOTOPHOBIA: 0
MYALGIAS: 0
BLURRED VISION: 0
CHILLS: 0
EYE PAIN: 0
NECK PAIN: 0
DOUBLE VISION: 0
HALLUCINATIONS: 0
FEVER: 0
TINGLING: 0
SPUTUM PRODUCTION: 0
TREMORS: 0
NAUSEA: 0
HEARTBURN: 0
HEADACHES: 0
BACK PAIN: 0
CONSTIPATION: 1
CLAUDICATION: 0
SHORTNESS OF BREATH: 1
VOMITING: 0
COUGH: 1
WEIGHT LOSS: 0

## 2020-02-20 NOTE — PROGRESS NOTES
[facility] Medicine Daily Progress Note    Date of Service  2/20/2020    Chief Complaint  79 y.o. female admitted 2/13/2020 with severe anemia    Interval Problem Update  2/18: Pulmonary status improved. BNP down to 1617 from 2011. K low at 3.5 which was replaced. Scheduled for OR today for colonic mass . H/H stable.   2/19: S/p R-hemicolectomy. H/H stable. Low BP this morning, but improved after IVFB. No clinical evidence of bleeding.   2/20: Hgb dropped from 8.2 to 7.1 this morning. No clinical evidence of bleeding and VS stable. Will repeat CBC this afternoon and replace if Hgb <7. Patient endorses mild abdominal tenderness to palpation and bloating, simethicone ordered.     Consultants/Specialty  Dr wu gi  Surgery dr aggarwal    Code Status  FULL code     Disposition  Continue IP , discharge home once medically cleared     Review of Systems  Review of Systems   Constitutional: Positive for malaise/fatigue. Negative for chills, fever and weight loss.   HENT: Negative for ear pain, hearing loss and tinnitus.    Eyes: Negative for blurred vision, double vision, photophobia and pain.   Respiratory: Positive for cough and shortness of breath. Negative for sputum production.    Cardiovascular: Negative for chest pain, palpitations and claudication.   Gastrointestinal: Positive for abdominal pain and constipation. Negative for heartburn, nausea and vomiting.   Genitourinary: Negative for dysuria, frequency and urgency.   Musculoskeletal: Negative for back pain, myalgias and neck pain.   Neurological: Negative for dizziness, tingling, tremors and headaches.   Psychiatric/Behavioral: Negative for depression and hallucinations.   All other systems reviewed and are negative.       Physical Exam  Temp:  [37.2 °C (98.9 °F)-37.9 °C (100.3 °F)] 37.6 °C (99.7 °F)  Pulse:  [73-87] 87  Resp:  [16-18] 16  BP: (104-120)/(44-74) 104/44  SpO2:  [92 %-99 %] 96 %    Physical Exam  Vitals signs and nursing note reviewed.    Constitutional:       General: She is not in acute distress.     Appearance: Normal appearance. She is normal weight. She is ill-appearing. She is not toxic-appearing or diaphoretic.   HENT:      Head: Normocephalic and atraumatic.      Nose: No congestion.   Eyes:      General: No scleral icterus.     Extraocular Movements: Extraocular movements intact.      Pupils: Pupils are equal, round, and reactive to light.   Neck:      Musculoskeletal: Normal range of motion and neck supple.   Cardiovascular:      Rate and Rhythm: Normal rate and regular rhythm.      Pulses: Normal pulses.      Heart sounds: Normal heart sounds.   Pulmonary:      Effort: Pulmonary effort is normal. No respiratory distress.      Breath sounds: No stridor. Rhonchi (bilaterally ) present. No wheezing or rales.   Chest:      Chest wall: No tenderness.   Abdominal:      General: Abdomen is flat. Bowel sounds are normal. There is distension.      Palpations: Abdomen is soft. There is no mass.      Tenderness: There is abdominal tenderness. There is no guarding or rebound.      Hernia: No hernia is present.      Comments: Tympanic to percussion throughout    Musculoskeletal: Normal range of motion.         General: No swelling, tenderness, deformity or signs of injury.      Right lower leg: Edema present.      Left lower leg: Edema present.   Skin:     General: Skin is warm.      Coloration: Skin is pale. Skin is not jaundiced.      Findings: No bruising, erythema or rash.      Comments: S/p Right hemicolectomy, dressing c/d/i    Neurological:      General: No focal deficit present.      Mental Status: She is alert and oriented to person, place, and time. Mental status is at baseline.      Motor: Weakness present.   Psychiatric:         Mood and Affect: Mood normal.         Fluids    Intake/Output Summary (Last 24 hours) at 2/20/2020 1020  Last data filed at 2/19/2020 1804  Gross per 24 hour   Intake 220 ml   Output 800 ml   Net -580 ml        Laboratory  Recent Labs     02/18/20  0155 02/19/20 0255 02/20/20  0134   WBC 6.3 17.4* 11.7*   RBC 3.42* 3.49* 2.97*   HEMOGLOBIN 8.0* 8.2* 7.1*   HEMATOCRIT 27.5* 28.2* 23.7*   MCV 80.4* 80.8* 79.8*   MCH 23.4* 23.5* 23.9*   MCHC 29.1* 29.1* 30.0*   RDW 68.0* 70.0* 68.3*   PLATELETCT 122* 115* 99*   MPV 10.3 9.7 9.8     Recent Labs     02/18/20  0155 02/19/20 0255 02/20/20  0134   SODIUM 137 141 134*   POTASSIUM 3.5* 4.2 3.4*   CHLORIDE 100 102 99   CO2 31 29 28   GLUCOSE 97 110* 113*   BUN 8 15 17   CREATININE 0.47* 0.81 0.70   CALCIUM 7.8* 8.1* 8.1*                   Imaging  DX-CHEST-PORTABLE (1 VIEW)   Final Result      1.  Worsening bibasilar consolidation and/or pleural fluid, particularly in the LEFT.   2.  Minimal interval improvement of pulmonary edema.   3.  No other significant change from prior exam.         EC-ECHOCARDIOGRAM COMPLETE W/O CONT   Final Result      DX-CHEST-2 VIEWS   Final Result      1.  Bilateral interstitial opacities consistent with pulmonary edema. Small bilateral pleural effusions.   2.  Mild cardiac silhouette enlargement.   3.  Emphysematous changes.           Assessment/Plan  * GIB (gastrointestinal bleeding)- (present on admission)  Assessment & Plan  Patient with a prior history of previous GI bleed last fall.  Records from outside facility indicates that an occult stool was done in the emergency room and was positive.   - Colonoscopy --> right colon mass  - Iv PPI to po on 2/15. Continue PO   - Hgb low today at 7.1, no clinical evidence of bleeding  - Repeat CBC and iron studies ordered  - will transfuse if Hgb <7 or clinical evidence of bleeding     Colonic mass- (present on admission)  Assessment & Plan  Surgery dr aggarwal consulted   S/p right hemicolectomy on 2/18   Continue liquid diet as tolerated    Anemia requiring transfusions- (present on admission)  Assessment & Plan  Received 2 units PRBCs 2/17.   Hgb 7.1 today, will repeat CBC this afternoon   Continue to  Monitor and transfuse as needed    Hypokalemia  Assessment & Plan  Low this morning, will replace PO   Continue to monitor on BMP    Acute pulmonary edema (HCC)  Assessment & Plan  Stable, continue to monitor.  Lasix 40mg x 1 orderd on 2/15 and 2/16. On 2/17- cxr reviewed--> iv lasix 40mg iv q8 x 3 doses   Echo-no acute findings  Holding diuresis d/t low Bps   BNP stable today     Pulmonary emphysema (HCC)- (present on admission)  Assessment & Plan  Not in acute exacerbation  Still exhibiting weak, dry cough   Currently requiring supplemental o2, attempt to wean as tolerated. If patient cannot be weaned, will will obtain walking o2 sats closer to discharge.   Continue Bronchodilators prn. Encouraging mobility and IS   PEP therapy added 2/19    HTN (hypertension)- (present on admission)  Assessment & Plan  Home lisinopril and lopressor held for allow BP room for effective diuresis  BP stable today   Continue to monitor Bps     PAD (peripheral artery disease) (HCC)- (present on admission)  Assessment & Plan  Patient states that she underwent angioplasty in December with Dr. Mohsin of vascular surgery.  She has been taking aspirin and Plavix in addition to her Lipitor.  Holding both aspirin and Plavix for now, okay to continue home Lipitor.       VTE prophylaxis: scd only      I have performed a physical exam and reviewed and updated ROS and Plan today (2/20/2020). In review of yesterday's note (2/19/2020), there are no changes except as documented above.

## 2020-02-20 NOTE — CARE PLAN
Problem: Nutritional:  Goal: Achieve adequate nutritional intake  Description: Diet > NPO and patient will consume >50% of meals.   Outcome: MET     PO intake generally % of meals. Supplement TID with meals. Pt agreeable to continue supplements.

## 2020-02-20 NOTE — CARE PLAN
Problem: Communication  Goal: The ability to communicate needs accurately and effectively will improve  Outcome: PROGRESSING AS EXPECTED  Intervention: Dennis patient and significant other/support system to call light to alert staff of needs  Flowsheets (Taken 2/19/2020 2124)  Oriented to:: All of the Following : Location of Bathroom, Visiting Policy, Unit Routine, Call Light and Bedside Controls, Bedside Rail Policy, Smoking Policy, Rights and Responsibilities, Bedside Report, and Patient Education Notebook  Note: Pt educated on POC and medications. Pt verbalized understanding.      Problem: Safety  Goal: Will remain free from falls  Outcome: PROGRESSING AS EXPECTED  Intervention: Assess risk factors for falls  Flowsheets  Taken 2/19/2020 2000 by Harpreet Freeman R.N.  Pt Calls for Assistance: Yes  Taken 2/18/2020 1700 by Linwood Ramey R.N.  Fall Risk: High Risk to Fall - 2 or more points   History of fall: 2  Taken 2/19/2020 2124 by Harpreet Freeman R.N.  Mobility Status Assessment: 2-2 Healthcare Providers Required for Assistance with Ambulation & Transfer  Risk for Injury-Any positive answers results in the pt being at high risk for fall related injury: Surgery:  Thoracic or Abdominal Surgery or Lower Limb Amputation  Note: Pt bedside table and call-light are within reach, bed in lowest position and locked. Treaded socks on and pt has been educated on call light use and asked to call before getting up.

## 2020-02-20 NOTE — PROGRESS NOTES
Assumed care of pt, beside report received from XUAN Duke. Updated on POC, call light within reach all fall precautions within place. Bed locked and lowered. Pt instructed to call for assistance before getting up. All questions answered, no other needs at this time.

## 2020-02-21 ENCOUNTER — APPOINTMENT (OUTPATIENT)
Dept: RADIOLOGY | Facility: MEDICAL CENTER | Age: 80
DRG: 329 | End: 2020-02-21
Attending: HOSPITALIST
Payer: MEDICARE

## 2020-02-21 PROBLEM — K92.2 GIB (GASTROINTESTINAL BLEEDING): Status: RESOLVED | Noted: 2020-02-14 | Resolved: 2020-02-21

## 2020-02-21 LAB
ANION GAP SERPL CALC-SCNC: 4 MMOL/L (ref 0–11.9)
BUN SERPL-MCNC: 11 MG/DL (ref 8–22)
CALCIUM SERPL-MCNC: 8.1 MG/DL (ref 8.5–10.5)
CHLORIDE SERPL-SCNC: 100 MMOL/L (ref 96–112)
CO2 SERPL-SCNC: 28 MMOL/L (ref 20–33)
CREAT SERPL-MCNC: 0.5 MG/DL (ref 0.5–1.4)
ERYTHROCYTE [DISTWIDTH] IN BLOOD BY AUTOMATED COUNT: 68.1 FL (ref 35.9–50)
GLUCOSE SERPL-MCNC: 154 MG/DL (ref 65–99)
HCT VFR BLD AUTO: 25.8 % (ref 37–47)
HGB BLD-MCNC: 7.5 G/DL (ref 12–16)
MCH RBC QN AUTO: 23.4 PG (ref 27–33)
MCHC RBC AUTO-ENTMCNC: 29.1 G/DL (ref 33.6–35)
MCV RBC AUTO: 80.6 FL (ref 81.4–97.8)
PLATELET # BLD AUTO: 119 K/UL (ref 164–446)
PMV BLD AUTO: 9.9 FL (ref 9–12.9)
POTASSIUM SERPL-SCNC: 4 MMOL/L (ref 3.6–5.5)
RBC # BLD AUTO: 3.2 M/UL (ref 4.2–5.4)
SODIUM SERPL-SCNC: 132 MMOL/L (ref 135–145)
WBC # BLD AUTO: 12.7 K/UL (ref 4.8–10.8)

## 2020-02-21 PROCEDURE — 700102 HCHG RX REV CODE 250 W/ 637 OVERRIDE(OP): Performed by: HOSPITALIST

## 2020-02-21 PROCEDURE — A9270 NON-COVERED ITEM OR SERVICE: HCPCS | Performed by: HOSPITALIST

## 2020-02-21 PROCEDURE — A9270 NON-COVERED ITEM OR SERVICE: HCPCS | Performed by: NURSE PRACTITIONER

## 2020-02-21 PROCEDURE — 71045 X-RAY EXAM CHEST 1 VIEW: CPT

## 2020-02-21 PROCEDURE — 700102 HCHG RX REV CODE 250 W/ 637 OVERRIDE(OP): Performed by: NURSE PRACTITIONER

## 2020-02-21 PROCEDURE — 80048 BASIC METABOLIC PNL TOTAL CA: CPT

## 2020-02-21 PROCEDURE — 85027 COMPLETE CBC AUTOMATED: CPT

## 2020-02-21 PROCEDURE — 700105 HCHG RX REV CODE 258

## 2020-02-21 PROCEDURE — 36415 COLL VENOUS BLD VENIPUNCTURE: CPT

## 2020-02-21 PROCEDURE — 97165 OT EVAL LOW COMPLEX 30 MIN: CPT

## 2020-02-21 PROCEDURE — 700105 HCHG RX REV CODE 258: Performed by: HOSPITALIST

## 2020-02-21 PROCEDURE — 94669 MECHANICAL CHEST WALL OSCILL: CPT

## 2020-02-21 PROCEDURE — 99232 SBSQ HOSP IP/OBS MODERATE 35: CPT | Performed by: HOSPITALIST

## 2020-02-21 PROCEDURE — 97162 PT EVAL MOD COMPLEX 30 MIN: CPT

## 2020-02-21 PROCEDURE — 770020 HCHG ROOM/CARE - TELE (206)

## 2020-02-21 PROCEDURE — 700111 HCHG RX REV CODE 636 W/ 250 OVERRIDE (IP): Performed by: HOSPITALIST

## 2020-02-21 RX ORDER — DIPHENHYDRAMINE HCL 25 MG
25 TABLET ORAL ONCE
Status: COMPLETED | OUTPATIENT
Start: 2020-02-21 | End: 2020-02-21

## 2020-02-21 RX ORDER — DIPHENHYDRAMINE HYDROCHLORIDE 50 MG/ML
25 INJECTION INTRAMUSCULAR; INTRAVENOUS ONCE
Status: COMPLETED | OUTPATIENT
Start: 2020-02-21 | End: 2020-02-21

## 2020-02-21 RX ORDER — ACETAMINOPHEN 325 MG/1
650 TABLET ORAL ONCE
Status: COMPLETED | OUTPATIENT
Start: 2020-02-21 | End: 2020-02-21

## 2020-02-21 RX ORDER — FUROSEMIDE 10 MG/ML
20 INJECTION INTRAMUSCULAR; INTRAVENOUS
Status: DISCONTINUED | OUTPATIENT
Start: 2020-02-21 | End: 2020-02-21

## 2020-02-21 RX ORDER — FUROSEMIDE 10 MG/ML
20 INJECTION INTRAMUSCULAR; INTRAVENOUS
Status: DISCONTINUED | OUTPATIENT
Start: 2020-02-21 | End: 2020-02-24

## 2020-02-21 RX ORDER — SODIUM CHLORIDE 9 MG/ML
INJECTION, SOLUTION INTRAVENOUS
Status: COMPLETED
Start: 2020-02-21 | End: 2020-02-21

## 2020-02-21 RX ADMIN — ATORVASTATIN CALCIUM 80 MG: 80 TABLET, FILM COATED ORAL at 05:01

## 2020-02-21 RX ADMIN — BUSPIRONE HYDROCHLORIDE 5 MG: 10 TABLET ORAL at 05:02

## 2020-02-21 RX ADMIN — FUROSEMIDE 20 MG: 10 INJECTION, SOLUTION INTRAMUSCULAR; INTRAVENOUS at 15:42

## 2020-02-21 RX ADMIN — SODIUM CHLORIDE 500 ML: 9 INJECTION, SOLUTION INTRAVENOUS at 08:39

## 2020-02-21 RX ADMIN — SODIUM CHLORIDE 1375 MG: 9 INJECTION, SOLUTION INTRAVENOUS at 11:03

## 2020-02-21 RX ADMIN — GABAPENTIN 100 MG: 100 CAPSULE ORAL at 16:01

## 2020-02-21 RX ADMIN — GABAPENTIN 100 MG: 100 CAPSULE ORAL at 05:01

## 2020-02-21 RX ADMIN — GABAPENTIN 100 MG: 100 CAPSULE ORAL at 11:17

## 2020-02-21 RX ADMIN — BUSPIRONE HYDROCHLORIDE 5 MG: 10 TABLET ORAL at 20:37

## 2020-02-21 RX ADMIN — BUSPIRONE HYDROCHLORIDE 5 MG: 10 TABLET ORAL at 15:12

## 2020-02-21 RX ADMIN — DIPHENHYDRAMINE HYDROCHLORIDE 25 MG: 25 TABLET ORAL at 08:23

## 2020-02-21 RX ADMIN — OMEPRAZOLE 20 MG: 20 CAPSULE, DELAYED RELEASE ORAL at 05:01

## 2020-02-21 RX ADMIN — SIMETHICONE CHEW TAB 80 MG 80 MG: 80 TABLET ORAL at 11:18

## 2020-02-21 RX ADMIN — ACETAMINOPHEN 650 MG: 325 TABLET, FILM COATED ORAL at 08:23

## 2020-02-21 RX ADMIN — GUAIFENESIN 600 MG: 600 TABLET, EXTENDED RELEASE ORAL at 16:01

## 2020-02-21 RX ADMIN — FUROSEMIDE 20 MG: 10 INJECTION, SOLUTION INTRAMUSCULAR; INTRAVENOUS at 10:06

## 2020-02-21 RX ADMIN — SODIUM CHLORIDE 25 MG: 9 INJECTION, SOLUTION INTRAVENOUS at 08:38

## 2020-02-21 RX ADMIN — GUAIFENESIN 600 MG: 600 TABLET, EXTENDED RELEASE ORAL at 05:01

## 2020-02-21 ASSESSMENT — GAIT ASSESSMENTS
ASSISTIVE DEVICE: FRONT WHEEL WALKER
GAIT LEVEL OF ASSIST: MINIMAL ASSIST
DEVIATION: DECREASED BASE OF SUPPORT;DECREASED HEEL STRIKE;DECREASED TOE OFF;OTHER (COMMENT)
DISTANCE (FEET): 20

## 2020-02-21 ASSESSMENT — COGNITIVE AND FUNCTIONAL STATUS - GENERAL
CLIMB 3 TO 5 STEPS WITH RAILING: A LITTLE
DRESSING REGULAR LOWER BODY CLOTHING: A LITTLE
MOBILITY SCORE: 13
EATING MEALS: A LITTLE
MOVING FROM LYING ON BACK TO SITTING ON SIDE OF FLAT BED: UNABLE
STANDING UP FROM CHAIR USING ARMS: A LITTLE
SUGGESTED CMS G CODE MODIFIER MOBILITY: CL
WALKING IN HOSPITAL ROOM: A LITTLE
HELP NEEDED FOR BATHING: A LITTLE
SUGGESTED CMS G CODE MODIFIER DAILY ACTIVITY: CK
TOILETING: A LITTLE
DRESSING REGULAR UPPER BODY CLOTHING: A LITTLE
TURNING FROM BACK TO SIDE WHILE IN FLAT BAD: A LOT
MOVING TO AND FROM BED TO CHAIR: UNABLE
PERSONAL GROOMING: A LITTLE
DAILY ACTIVITIY SCORE: 18

## 2020-02-21 ASSESSMENT — ENCOUNTER SYMPTOMS
DOUBLE VISION: 0
COUGH: 0
PALPITATIONS: 0
INSOMNIA: 0
SENSORY CHANGE: 0
SPEECH CHANGE: 0
CONSTIPATION: 0
FEVER: 0
WEAKNESS: 1
NAUSEA: 0
ABDOMINAL PAIN: 1
DIZZINESS: 0
ABDOMINAL PAIN: 0
DEPRESSION: 0
BLOOD IN STOOL: 0
SHORTNESS OF BREATH: 0
CHILLS: 0
HEARTBURN: 0
BLURRED VISION: 0
CLAUDICATION: 0
VOMITING: 0
DIARRHEA: 0
HEADACHES: 0
FOCAL WEAKNESS: 0
EYE PAIN: 0
PHOTOPHOBIA: 0
NERVOUS/ANXIOUS: 0
SPUTUM PRODUCTION: 0

## 2020-02-21 ASSESSMENT — ACTIVITIES OF DAILY LIVING (ADL): TOILETING: INDEPENDENT

## 2020-02-21 NOTE — PROGRESS NOTES
Patient refused bed alarm, educated regarding importance.  Education reinforced by XUAN Shepherd.  Patient continues to refuse.

## 2020-02-21 NOTE — PROGRESS NOTES
Care assumed of patient by XUAN Mcgregor. Bed side shift report received from day shift RN. Patient on 3L NC with a wet cough. Abdominal dressing observed and intact with no drainage. Will continue to monitor.

## 2020-02-21 NOTE — DISCHARGE PLANNING
Hospital Care management Discharge Planning     Anticipated Discharge Disposition:  · SNF     Action:  · This RN CM met with pt and  at bedside to obtain SNF choice.  · Per choice form, patient preference is as follows:  · (1) [facility]  · (2) [facility]  · (3) [facility]  · This RN CM faxed choice form to [facility].  · Fax confirmation received at [phone].     Barriers to Discharge:  · Medical Clearance.  · SNF acceptance and bed availability.     Plan:  · Follow up with [facility] and treatment team.

## 2020-02-21 NOTE — THERAPY
"Physical Therapy Evaluation completed.   Bed Mobility:  Supine to Sit: Moderate Assist  Transfers: Sit to Stand: Minimal Assist  Gait: Level Of Assist: Minimal Assist with Front-Wheel Walker       Plan of Care: Will benefit from Physical Therapy 4 times per week  Discharge Recommendations: Equipment: Will Continue to Assess for Equipment Needs. See below    Pt presents to PT with impaired functional endurance, balance, gait and functional strength. She was able to demonstrate short distance ambulation with FWW with min A. She was primarily limited 2' to decreased endruance and general fatigued with minimal activity. She was also limited 2' to abdominal pain. Currently pt would require continued skilled PT/placement prior to dc to home given current objective findings, age, I PLOF, and current co-morbidities. Noted that this was pt's 1st attempt at ambulatory activity per pt since admit. Pt needs increased OOB activity with both PT and nursing for progression and dc planning.     See \"Rehab Therapy-Acute\" Patient Summary Report for complete documentation.     "

## 2020-02-21 NOTE — DISCHARGE PLANNING
Hospital Care Managment Assessment    In the case of an emergency, pt's NOK is Sagar Montanez (Spouse) [phone].     This RNCM spoke with pt at bedside and obtained the information used in this assessment. Pt verified accuracy of facesheet. Pt lives in a two story house with her , Sagar. Pt uses the [facility] pharmacy on [address]. Prior to current hospitalization, pt was completley independent with ADLS/IADLS. Pt denies use of home DME. Pt's  drives her to and from her doctors appoitnments. Pt collects approximately $1480/month in disability. Pt denies hx of SA or MH. Pt's plan is to discharge to a SNF prior to returning home.     Upon D/C, pt states that her , Sagar will provide transport home when applicable.     Information Source  Orientation : Oriented x 4  Information Given By: Patient  Informant's Name: Hectorene  Who is responsible for making decisions for patient? : Patient    Readmission Evaluation  Is this a readmission?: No    Elopement Risk  Legal Hold: No  Ambulatory or Self Mobile in Wheelchair: Yes  Disoriented: No  Psychiatric Symptoms: None  History of Wandering: No  Elopement this Admit: No  Vocalizing Wanting to Leave: No  Displays Behaviors, Body Language Wanting to Leave: No-Not at Risk for Elopement  Elopement Risk: Not at Risk for Elopement    Interdisciplinary Discharge Planning  Primary Care Physician: Denver Miller  Lives with - Patient's Self Care Capacity: Spouse  Patient or legal guardian wants to designate a caregiver (see row info): No  Support Systems: Spouse / Significant Other  Housing / Facility: 2 Story House  Do You Take your Prescribed Medications Regularly: Yes  Able to Return to Previous ADL's: Future Time w/Therapy  Mobility Issues: No  Prior Services: None  Patient Expects to be Discharged to:: SNF  Assistance Needed: Yes  Durable Medical Equipment: Not Applicable    Discharge Preparedness  What is your plan after discharge?: Skilled nursing facility  What are  your discharge supports?: Spouse  Prior Functional Level: Ambulatory, Independent with Activities of Daily Living, Independent with Medication Management  Difficulity with ADLs: None  Difficulity with IADLs: None    Functional Assesment  Prior Functional Level: Ambulatory, Independent with Activities of Daily Living, Independent with Medication Management    Finances  Financial Barriers to Discharge: No  Prescription Coverage: Yes    Vision / Hearing Impairment  Vision Impairment : Yes  Right Eye Vision: Impaired, Wears Glasses  Left Eye Vision: Impaired, Wears Glasses  Hearing Impairment : No              Domestic Abuse  Have you ever been the victim of abuse or violence?: No  Physical Abuse or Sexual Abuse: No  Verbal Abuse or Emotional Abuse: No  Possible Abuse Reported to:: Not Applicable    Psychological Assessment  History of Substance Abuse: None  History of Psychiatric Problems: No  Non-compliant with Treatment: No  Newly Diagnosed Illness: Yes    Discharge Risks or Barriers  Discharge risks or barriers?: Post-acute placement / services    Anticipated Discharge Information  Anticipated discharge disposition: SNF

## 2020-02-21 NOTE — PROGRESS NOTES
IV Iron Per Pharmacy Note    Patient Lean Body Weight:  50 kg  Reason for Iron Replacement: Iron Deficiency Anemia      Lab Results   Component Value Date/Time    WBC 12.7 (H) 02/21/2020 02:23 AM    RBC 3.20 (L) 02/21/2020 02:23 AM    HEMOGLOBIN 7.5 (L) 02/21/2020 02:23 AM    HEMATOCRIT 25.8 (L) 02/21/2020 02:23 AM    MCV 80.6 (L) 02/21/2020 02:23 AM    MCH 23.4 (L) 02/21/2020 02:23 AM    MCHC 29.1 (L) 02/21/2020 02:23 AM    MPV 9.9 02/21/2020 02:23 AM       Recent Labs     02/20/20  1227   IRON 12*         Recent Labs     02/21/20  0223   CREATININE 0.50          Assessment/Plan:  1. IV Iron Indicated.   2. Give Iron Dextran 25 mg IV test dose following diphenhydramine/acetaminophen premeds over 30 minutes per protocol.  3. If no reaction (Anaphylaxis, Hypotension/Hypertension, N/V/D, Chest pain/Back Pain, Urticaria/Pruritis) in the next hour, proceed to full dose. Nursing to call the pharmacy IV room at ext. [phone] for full dose.  4. Full dose: Iron Dextran 1375 mg IV over 4 hours. Continue to monitor for delayed ADR including: Arthralgia/myalgia, Headache/backache, chills/dizziness/malaise, moderate to high fever and n/v.      Matthew Marks, PharmD

## 2020-02-21 NOTE — DISCHARGE PLANNING
Received Choice form at 8228   Agency/Facility Name: 1. [facility], [facility]. [facility], 3.[facility]  Referral sent per Choice form @ 0468

## 2020-02-21 NOTE — DIETARY
Nutrition Services: Update   Day 8 of admit.  Tameka Montanez is a 79 y.o. female with admitting DX of GI Bleed, Anemia    Pt is currently on Full liquid diet. PO generally %. Supplements TID with meals.    Noted squared shoulders, severely depressed temple regions, protruding/prominent clavicles, prominent depressions in dorsal region between thumb and forfinger.    Malnutrition Risk: Pt with severe malnutrition in the context of chronic illness related malnutrition related to COPD as evidenced by severe fat and severe muscle loss per nutrition focused physical exam.    Recommendations/Plan:  1. Advance diet > full liquids as medically able  2. Continue supplements with meals TID, high protein snack   3. Encourage intake of meals  4. Document intake of all meals as % taken in ADL's to provide interdisciplinary communication across all shifts.   5. Monitor weight.  6. Nutrition rep will continue to see patient for ongoing meal and snack preferences.    RD following weekly

## 2020-02-21 NOTE — CARE PLAN
Respiratory Update    Treatment modality: PEP  Frequency: QID    Pt tolerating current treatments well with no adverse reactions.

## 2020-02-21 NOTE — PROGRESS NOTES
Surgical Progress Note    Author: MERLENE Wylie Date & Time created: 2020   11:20 AM     Interval Events:  S/P RIGHT BRADY COLECTOMY, ROBOT-ASSISTED, USING DA LON XI. Pt is returning from the restroom and sitting in chair at bedside. She is passing flatus and having bowel movements. Tolerating full liquids without nausea or vomiting. She ambulated the halls earlier this morning. Alert and oriented. NAD. Breathing unlabored. Abdomen firm, distended, tender-discussed with RN. Monitor now that she is having bowel movements. VS reviewed. Labs reviewed. Dr. Madrigal office is covering. Continue to monitor.   Review of Systems   Constitutional: Negative for chills and fever.   Respiratory: Negative for cough and shortness of breath.    Gastrointestinal: Positive for abdominal pain. Negative for constipation, diarrhea, heartburn, nausea and vomiting.   Skin: Negative for itching and rash.     Hemodynamics:  Temp (24hrs), Av.3 °C (99.2 °F), Min:36.8 °C (98.2 °F), Max:37.8 °C (100 °F)  Temperature: 36.8 °C (98.2 °F)  Pulse  Av.7  Min: 16  Max: 97   Blood Pressure : 133/66     Respiratory:    Respiration: 18, Pulse Oximetry: 92 %     Work Of Breathing / Effort: Mild  RUL Breath Sounds: Crackles, RML Breath Sounds: Crackles, RLL Breath Sounds: Crackles, ANABEL Breath Sounds: Crackles, LLL Breath Sounds: Crackles  Neuro:  GCS       Fluids:    Intake/Output Summary (Last 24 hours) at 2020 1120  Last data filed at 2020 0805  Gross per 24 hour   Intake 580 ml   Output --   Net 580 ml     Weight: 44.5 kg (98 lb 1.7 oz)  Current Diet Order   Procedures   • Diet Order Full Liquid     Physical Exam  Constitutional:       General: She is not in acute distress.  Cardiovascular:      Rate and Rhythm: Normal rate.   Pulmonary:      Effort: Pulmonary effort is normal. No respiratory distress.   Abdominal:      General: There is distension.      Tenderness: There is abdominal tenderness.   Skin:     General: Skin is  warm and dry.   Neurological:      General: No focal deficit present.      Mental Status: She is alert and oriented to person, place, and time.   Psychiatric:         Mood and Affect: Mood normal.         Behavior: Behavior normal.       Labs:  Recent Results (from the past 24 hour(s))   IRON/TOTAL IRON BIND    Collection Time: 02/20/20 12:27 PM   Result Value Ref Range    Iron 12 (L) 40 - 170 ug/dL    Total Iron Binding 351 250 - 450 ug/dL    % Saturation 3 (L) 15 - 55 %   CBC WITHOUT DIFFERENTIAL    Collection Time: 02/20/20 12:27 PM   Result Value Ref Range    WBC 11.4 (H) 4.8 - 10.8 K/uL    RBC 3.23 (L) 4.20 - 5.40 M/uL    Hemoglobin 7.6 (L) 12.0 - 16.0 g/dL    Hematocrit 26.4 (L) 37.0 - 47.0 %    MCV 81.7 81.4 - 97.8 fL    MCH 23.5 (L) 27.0 - 33.0 pg    MCHC 28.8 (L) 33.6 - 35.0 g/dL    RDW 69.7 (H) 35.9 - 50.0 fL    Platelet Count 115 (L) 164 - 446 K/uL    MPV 10.8 9.0 - 12.9 fL   CBC WITHOUT DIFFERENTIAL    Collection Time: 02/21/20  2:23 AM   Result Value Ref Range    WBC 12.7 (H) 4.8 - 10.8 K/uL    RBC 3.20 (L) 4.20 - 5.40 M/uL    Hemoglobin 7.5 (L) 12.0 - 16.0 g/dL    Hematocrit 25.8 (L) 37.0 - 47.0 %    MCV 80.6 (L) 81.4 - 97.8 fL    MCH 23.4 (L) 27.0 - 33.0 pg    MCHC 29.1 (L) 33.6 - 35.0 g/dL    RDW 68.1 (H) 35.9 - 50.0 fL    Platelet Count 119 (L) 164 - 446 K/uL    MPV 9.9 9.0 - 12.9 fL   Basic Metabolic Panel    Collection Time: 02/21/20  2:23 AM   Result Value Ref Range    Sodium 132 (L) 135 - 145 mmol/L    Potassium 4.0 3.6 - 5.5 mmol/L    Chloride 100 96 - 112 mmol/L    Co2 28 20 - 33 mmol/L    Glucose 154 (H) 65 - 99 mg/dL    Bun 11 8 - 22 mg/dL    Creatinine 0.50 0.50 - 1.40 mg/dL    Calcium 8.1 (L) 8.5 - 10.5 mg/dL    Anion Gap 4.0 0.0 - 11.9   ESTIMATED GFR    Collection Time: 02/21/20  2:23 AM   Result Value Ref Range    GFR If African American >60 >60 mL/min/1.73 m 2    GFR If Non African American >60 >60 mL/min/1.73 m 2     Medical Decision Making, by Problem:  Active Hospital Problems     Diagnosis   • Colonic mass [K63.89]     Priority: High   • GIB (gastrointestinal bleeding) [K92.2]     Priority: High   • Anemia requiring transfusions [D64.9]     Priority: High   • Acute pulmonary edema (HCC) [J81.0]   • Hypokalemia [E87.6]   • PAD (peripheral artery disease) (HCC) [I73.9]   • HTN (hypertension) [I10]   • Pulmonary emphysema (HCC) [J43.9]     Plan:  S/P RIGHT BRADY COLECTOMY, ROBOT-ASSISTED, USING DA LON XI. Pt is returning from the restroom and sitting in chair at bedside. She is passing flatus and having bowel movements. Tolerating full liquids without nausea or vomiting. She ambulated the halls earlier this morning. Alert and oriented. NAD. Breathing unlabored. Abdomen firm, distended, tender-discussed with RN. Monitor now that she is having bowel movements. VS reviewed. Labs reviewed. Dr. Madrigal office is covering. Continue to monitor.     Quality Measures:  Quality-Core Measures   Reviewed items::  Labs reviewed and Medications reviewed  Guzmán catheter::  No Guzmán  DVT prophylaxis pharmacological::  Contraindicated - Anemia requiring blood transfusion  DVT prophylaxis - mechanical:  SCDs  Ulcer Prophylaxis::  Yes      Discussed patient condition with RN, Patient and Dr. Yao

## 2020-02-21 NOTE — THERAPY
"Occupational Therapy Evaluation completed.   Functional Status: Pt is an 80 y/o female admitted with anemia found to have colon mass now s/p hemicolectomy. She was pleasant and cooperative. Rocio sit<>stand. Rocio Functional mobility with FWW. Rocio grooming in stance. Rocio toileting/toilet txf. She is limited by weakness, fatigue, impaired balance which impacts independence in self care and functional mobility.  Plan of Care: Will benefit from Occupational Therapy 3 times per week  Discharge Recommendations:  Equipment: Will Continue to Assess for Equipment Needs. At this time, recommend post-acute placement for additional occupational therapy services prior to discharge home. Depends on progress made      See \"Rehab Therapy-Acute\" Patient Summary Report for complete documentation.    "

## 2020-02-21 NOTE — CARE PLAN
Problem: Safety  Goal: Will remain free from injury  Outcome: PROGRESSING AS EXPECTED     Problem: Bowel/Gastric:  Goal: Normal bowel function is maintained or improved  Outcome: PROGRESSING AS EXPECTED     Problem: Respiratory:  Goal: Respiratory status will improve  Outcome: PROGRESSING SLOWER THAN EXPECTED

## 2020-02-21 NOTE — PROGRESS NOTES
Hospital Medicine Daily Progress Note    Date of Service  2/21/2020    Chief Complaint  Severe anemia, transfer from [facility]    Hospital Course  Ms. Montanez is an 78 y/o female who initially presented to [facility] for staple removal from her head from a fall 1 week prior, when she was noted to look pale. They checked labs at that time and her hgb was severely low at 5.2. Stool guaiac was positive. They ordered 2 units of PRBC and requested transfer at that time since they do not have GI services available there. She follows with [facility] outpatient, who was contacted upon her transfer to [facility] and on 2/14/2020 the patient underwent EGD which was normal. Then on 2/16/2020 she had a colonoscopy done which showed a right colon mass. A biopsy was taken and a cold snare resection of a polyp from the sigmoid colon was done. A surgical consultation was obtained at that time and on 2/18/2020 a robotic assisted laparoscopic right hemicolectomy was done by Dr. Pat and Dr. Mitchell. Postoperatively she has done well and has remained on the telemetry floor due to her fluid overload, tenuous respiratory status, and for close monitoring.  Pathology results from both the colonoscopy and hemicolectomy biopsies showed benign tubular adenomas.  There was no high-grade dysplasia or malignancy seen.    Interval Problem Update  Still feeling weak. Sitting in a chair at the bedside. Complains of bloating but denies actual abdominal pain. Surgical site assessed & is uncomplicated.     WBC with mild interval increase overnight, 11.4 -> 12.7. H/H stable at 7.5/25.8. Hypokalemia resolved. Na stable at 132.     T-max 100 °F overnight, HR 60s-80s, SBP teens-140s, O2 saturations within the normal range on 3 L/min of oxygen via nasal cannula.    Consultants/Specialty  Gastroenterology-Dr. Rolon  General surgery-Dr. Pat    Code Status  Full code     Disposition  PT/OT recommending post-acute placement. SNF referral placed today. SW informed.      Review of Systems  Review of Systems   Constitutional: Positive for malaise/fatigue. Negative for chills and fever.   Eyes: Negative for blurred vision, double vision, photophobia and pain.   Respiratory: Negative for cough, sputum production and shortness of breath.    Cardiovascular: Negative for chest pain, palpitations and claudication.   Gastrointestinal: Negative for abdominal pain, blood in stool, constipation, melena, nausea and vomiting. Diarrhea: loose stools, but not diarrhea.        + bloating causing discomfort   Genitourinary: Negative for dysuria, frequency, hematuria and urgency.   Neurological: Positive for weakness. Negative for dizziness, sensory change, speech change, focal weakness and headaches.   Psychiatric/Behavioral: Negative for depression. The patient is not nervous/anxious and does not have insomnia.    All other systems reviewed and are negative.     Physical Exam  Temp:  [36.8 °C (98.2 °F)-37.8 °C (100 °F)] 36.8 °C (98.2 °F)  Pulse:  [67-97] 97  Resp:  [16-18] 18  BP: (114-144)/(53-66) 133/66  SpO2:  [92 %-99 %] 92 %    Physical Exam  Vitals signs and nursing note reviewed.   Constitutional:       General: She is not in acute distress.     Appearance: Normal appearance. She is underweight. She is ill-appearing (chronically ill-appearing).      Interventions: Nasal cannula in place.   HENT:      Head: Normocephalic and atraumatic.      Nose: No congestion.   Eyes:      Conjunctiva/sclera: Conjunctivae normal.      Pupils: Pupils are equal, round, and reactive to light.   Neck:      Musculoskeletal: Normal range of motion and neck supple.   Cardiovascular:      Rate and Rhythm: Normal rate and regular rhythm.      Pulses: Normal pulses.      Heart sounds: Heart sounds are distant.   Pulmonary:      Effort: Pulmonary effort is normal.      Breath sounds: Examination of the right-upper field reveals decreased breath sounds. Examination of the left-upper field reveals decreased breath  sounds. Examination of the right-middle field reveals decreased breath sounds. Examination of the right-lower field reveals decreased breath sounds. Examination of the left-lower field reveals decreased breath sounds. Decreased breath sounds present. No wheezing, rhonchi or rales.   Abdominal:      General: Abdomen is flat. Bowel sounds are decreased. There is distension.      Palpations: Abdomen is soft.      Tenderness: There is abdominal tenderness (very mild, at surgical site). There is no guarding or rebound.      Hernia: No hernia is present.          Comments: Tympanic to percussion throughout    Musculoskeletal: Normal range of motion.         General: No swelling, tenderness, deformity or signs of injury.      Right lower leg: No edema.      Left lower leg: No edema.   Skin:     General: Skin is warm.      Coloration: Skin is pale. Skin is not jaundiced.      Findings: No bruising, erythema or rash.   Neurological:      General: No focal deficit present.      Mental Status: She is alert and oriented to person, place, and time. Mental status is at baseline.      Motor: Weakness present.   Psychiatric:         Attention and Perception: Attention and perception normal.         Mood and Affect: Mood normal.         Speech: Speech normal.         Behavior: Behavior normal. Behavior is cooperative.         Thought Content: Thought content normal.         Cognition and Memory: Cognition and memory normal.         Judgment: Judgment normal.     Fluids    Intake/Output Summary (Last 24 hours) at 2/21/2020 1233  Last data filed at 2/21/2020 0805  Gross per 24 hour   Intake 340 ml   Output no documentation   Net 340 ml     Laboratory  Recent Labs     02/20/20  0134 02/20/20  1227 02/21/20  0223   WBC 11.7* 11.4* 12.7*   RBC 2.97* 3.23* 3.20*   HEMOGLOBIN 7.1* 7.6* 7.5*   HEMATOCRIT 23.7* 26.4* 25.8*   MCV 79.8* 81.7 80.6*   MCH 23.9* 23.5* 23.4*   MCHC 30.0* 28.8* 29.1*   RDW 68.3* 69.7* 68.1*   PLATELETCT 99* 115*  119*   MPV 9.8 10.8 9.9     Recent Labs     02/19/20  0255 02/20/20  0134 02/21/20  0223   SODIUM 141 134* 132*   POTASSIUM 4.2 3.4* 4.0   CHLORIDE 102 99 100   CO2 29 28 28   GLUCOSE 110* 113* 154*   BUN 15 17 11   CREATININE 0.81 0.70 0.50   CALCIUM 8.1* 8.1* 8.1*     Imaging  DX-CHEST-PORTABLE (1 VIEW)   Final Result      1.  Worsening bibasilar consolidation and/or pleural fluid, particularly in the LEFT.   2.  Minimal interval improvement of pulmonary edema.   3.  No other significant change from prior exam.         EC-ECHOCARDIOGRAM COMPLETE W/O CONT   Final Result      DX-CHEST-2 VIEWS   Final Result      1.  Bilateral interstitial opacities consistent with pulmonary edema. Small bilateral pleural effusions.   2.  Mild cardiac silhouette enlargement.   3.  Emphysematous changes.      DX-CHEST-LIMITED (1 VIEW)    (Results Pending)      Assessment/Plan  Anemia requiring transfusions- (present on admission)  Assessment & Plan  -Multifactorial.   -S/p 2 units PRBCs 2/17/2020.   -Hgb stable at 7.5 today.   -Found to be iron deficient, getting IV iron today.   -Recheck CBC tomorrow.     Colonic mass- (present on admission)  Assessment & Plan  -S/p right hemicolectomy on 2/18/2020. Pathology showed benign adenoma.   -Advance diet per surgery.   -Surgical incisions uncomplicated.     Acute pulmonary edema (HCC)  Assessment & Plan  -Stable today.   -Repeat cxr ordered for today.   -Echo largely WNL. EF normal.   -BP's stabilized, restart furosemide & monitor response.     Hypokalemia  Assessment & Plan  -Normal this morning after replacement was given yesterday. Recheck tomorrow. May need daily supplementation given that we have restarted IV furosemide but will see where she is at tomorrow a.m.    Pulmonary emphysema (HCC)- (present on admission)  Assessment & Plan  -Wean off O2 as tolerated.    -Continue bronchodilators prn.   -Continue to encourage mobility and incentive spirometry.    -PEP therapy added  2/19/2020.    HTN (hypertension)- (present on admission)  Assessment & Plan  -Home lisinopril and lopressor held for allow BP room for effective diuresis. Teens-140s overnight.   -Continue to trend per unit protocol.     PAD (peripheral artery disease) (HCC)- (present on admission)  Assessment & Plan  -S/p angioplasty in December with Dr. Mohsin of vascular surgery.  Has been taking aspirin and plavix at home, which are currently being held in light of her GI bleed & anemia.  -Continue home atorvastatin.      VTE prophylaxis: SCDs only given her recent GI bleed and anemia requiring transfusions.        Electronically signed by:  Lorelei Wasserman, KISHA, RN, APRN, ACNPC-AG, CCRN  Nurse Practitioner, [facility]  Work # [phone]    2/21/2020    12:33 PM

## 2020-02-22 ENCOUNTER — APPOINTMENT (OUTPATIENT)
Dept: RADIOLOGY | Facility: MEDICAL CENTER | Age: 80
DRG: 329 | End: 2020-02-22
Attending: HOSPITALIST
Payer: MEDICARE

## 2020-02-22 PROBLEM — E87.6 HYPOKALEMIA: Status: RESOLVED | Noted: 2020-02-15 | Resolved: 2020-02-22

## 2020-02-22 LAB
ANION GAP SERPL CALC-SCNC: 5 MMOL/L (ref 0–11.9)
BASOPHILS # BLD AUTO: 0.3 % (ref 0–1.8)
BASOPHILS # BLD: 0.03 K/UL (ref 0–0.12)
BUN SERPL-MCNC: 9 MG/DL (ref 8–22)
CALCIUM SERPL-MCNC: 8.2 MG/DL (ref 8.5–10.5)
CHLORIDE SERPL-SCNC: 98 MMOL/L (ref 96–112)
CO2 SERPL-SCNC: 29 MMOL/L (ref 20–33)
CREAT SERPL-MCNC: 0.47 MG/DL (ref 0.5–1.4)
EOSINOPHIL # BLD AUTO: 0.29 K/UL (ref 0–0.51)
EOSINOPHIL NFR BLD: 2.9 % (ref 0–6.9)
ERYTHROCYTE [DISTWIDTH] IN BLOOD BY AUTOMATED COUNT: 67 FL (ref 35.9–50)
GLUCOSE SERPL-MCNC: 113 MG/DL (ref 65–99)
HCT VFR BLD AUTO: 25.5 % (ref 37–47)
HGB BLD-MCNC: 7.8 G/DL (ref 12–16)
IMM GRANULOCYTES # BLD AUTO: 0.06 K/UL (ref 0–0.11)
IMM GRANULOCYTES NFR BLD AUTO: 0.6 % (ref 0–0.9)
LYMPHOCYTES # BLD AUTO: 0.92 K/UL (ref 1–4.8)
LYMPHOCYTES NFR BLD: 9.3 % (ref 22–41)
MAGNESIUM SERPL-MCNC: 1.8 MG/DL (ref 1.5–2.5)
MCH RBC QN AUTO: 24.2 PG (ref 27–33)
MCHC RBC AUTO-ENTMCNC: 30.6 G/DL (ref 33.6–35)
MCV RBC AUTO: 79.2 FL (ref 81.4–97.8)
MONOCYTES # BLD AUTO: 1.01 K/UL (ref 0–0.85)
MONOCYTES NFR BLD AUTO: 10.2 % (ref 0–13.4)
NEUTROPHILS # BLD AUTO: 7.56 K/UL (ref 2–7.15)
NEUTROPHILS NFR BLD: 76.7 % (ref 44–72)
NRBC # BLD AUTO: 0 K/UL
NRBC BLD-RTO: 0 /100 WBC
PLATELET # BLD AUTO: 153 K/UL (ref 164–446)
PMV BLD AUTO: 10.1 FL (ref 9–12.9)
POTASSIUM SERPL-SCNC: 3.6 MMOL/L (ref 3.6–5.5)
RBC # BLD AUTO: 3.22 M/UL (ref 4.2–5.4)
SODIUM SERPL-SCNC: 132 MMOL/L (ref 135–145)
WBC # BLD AUTO: 9.9 K/UL (ref 4.8–10.8)

## 2020-02-22 PROCEDURE — 99232 SBSQ HOSP IP/OBS MODERATE 35: CPT | Performed by: HOSPITALIST

## 2020-02-22 PROCEDURE — 36415 COLL VENOUS BLD VENIPUNCTURE: CPT

## 2020-02-22 PROCEDURE — 770020 HCHG ROOM/CARE - TELE (206)

## 2020-02-22 PROCEDURE — A9270 NON-COVERED ITEM OR SERVICE: HCPCS | Performed by: NURSE PRACTITIONER

## 2020-02-22 PROCEDURE — 94669 MECHANICAL CHEST WALL OSCILL: CPT

## 2020-02-22 PROCEDURE — 73501 X-RAY EXAM HIP UNI 1 VIEW: CPT | Mod: LT

## 2020-02-22 PROCEDURE — 85025 COMPLETE CBC W/AUTO DIFF WBC: CPT

## 2020-02-22 PROCEDURE — 700111 HCHG RX REV CODE 636 W/ 250 OVERRIDE (IP): Performed by: HOSPITALIST

## 2020-02-22 PROCEDURE — A9270 NON-COVERED ITEM OR SERVICE: HCPCS | Performed by: HOSPITALIST

## 2020-02-22 PROCEDURE — 80048 BASIC METABOLIC PNL TOTAL CA: CPT

## 2020-02-22 PROCEDURE — 83735 ASSAY OF MAGNESIUM: CPT

## 2020-02-22 PROCEDURE — 700102 HCHG RX REV CODE 250 W/ 637 OVERRIDE(OP): Performed by: NURSE PRACTITIONER

## 2020-02-22 PROCEDURE — 700102 HCHG RX REV CODE 250 W/ 637 OVERRIDE(OP): Performed by: HOSPITALIST

## 2020-02-22 RX ORDER — CEPHALEXIN 500 MG/1
500 CAPSULE ORAL EVERY 6 HOURS
Status: DISCONTINUED | OUTPATIENT
Start: 2020-02-22 | End: 2020-02-24 | Stop reason: HOSPADM

## 2020-02-22 RX ADMIN — ATORVASTATIN CALCIUM 80 MG: 80 TABLET, FILM COATED ORAL at 06:07

## 2020-02-22 RX ADMIN — CEPHALEXIN 500 MG: 500 CAPSULE ORAL at 12:49

## 2020-02-22 RX ADMIN — BUSPIRONE HYDROCHLORIDE 5 MG: 10 TABLET ORAL at 21:09

## 2020-02-22 RX ADMIN — SENNOSIDES AND DOCUSATE SODIUM 2 TABLET: 8.6; 5 TABLET ORAL at 06:07

## 2020-02-22 RX ADMIN — CEPHALEXIN 500 MG: 500 CAPSULE ORAL at 16:46

## 2020-02-22 RX ADMIN — BUSPIRONE HYDROCHLORIDE 5 MG: 10 TABLET ORAL at 06:07

## 2020-02-22 RX ADMIN — BUSPIRONE HYDROCHLORIDE 5 MG: 10 TABLET ORAL at 12:06

## 2020-02-22 RX ADMIN — GUAIFENESIN 600 MG: 600 TABLET, EXTENDED RELEASE ORAL at 16:45

## 2020-02-22 RX ADMIN — NYSTATIN 500000 UNITS: 100000 SUSPENSION ORAL at 12:06

## 2020-02-22 RX ADMIN — GABAPENTIN 100 MG: 100 CAPSULE ORAL at 06:07

## 2020-02-22 RX ADMIN — GABAPENTIN 100 MG: 100 CAPSULE ORAL at 12:06

## 2020-02-22 RX ADMIN — ACETAMINOPHEN 650 MG: 325 TABLET, FILM COATED ORAL at 06:06

## 2020-02-22 RX ADMIN — BENZOCAINE AND MENTHOL 1 LOZENGE: 15; 3.6 LOZENGE ORAL at 20:54

## 2020-02-22 RX ADMIN — FUROSEMIDE 20 MG: 10 INJECTION, SOLUTION INTRAMUSCULAR; INTRAVENOUS at 06:07

## 2020-02-22 RX ADMIN — ACETAMINOPHEN 650 MG: 325 TABLET, FILM COATED ORAL at 21:09

## 2020-02-22 RX ADMIN — NYSTATIN 500000 UNITS: 100000 SUSPENSION ORAL at 16:46

## 2020-02-22 RX ADMIN — OMEPRAZOLE 20 MG: 20 CAPSULE, DELAYED RELEASE ORAL at 06:07

## 2020-02-22 RX ADMIN — NYSTATIN 500000 UNITS: 100000 SUSPENSION ORAL at 21:09

## 2020-02-22 RX ADMIN — GABAPENTIN 100 MG: 100 CAPSULE ORAL at 16:45

## 2020-02-22 RX ADMIN — GUAIFENESIN 600 MG: 600 TABLET, EXTENDED RELEASE ORAL at 06:07

## 2020-02-22 RX ADMIN — FUROSEMIDE 20 MG: 10 INJECTION, SOLUTION INTRAMUSCULAR; INTRAVENOUS at 16:46

## 2020-02-22 ASSESSMENT — ENCOUNTER SYMPTOMS
BLURRED VISION: 0
SPUTUM PRODUCTION: 0
ABDOMINAL PAIN: 1
COUGH: 0
HEADACHES: 0
DIZZINESS: 0
ABDOMINAL PAIN: 0
NEUROLOGICAL NEGATIVE: 1
NERVOUS/ANXIOUS: 0
EYE PAIN: 0
PHOTOPHOBIA: 0
NAUSEA: 0
BLOOD IN STOOL: 0
CHILLS: 0
FEVER: 0
PSYCHIATRIC NEGATIVE: 1
SENSORY CHANGE: 0
CARDIOVASCULAR NEGATIVE: 1
VOMITING: 0
EYES NEGATIVE: 1
CLAUDICATION: 0
DIARRHEA: 0
DOUBLE VISION: 0
MUSCULOSKELETAL NEGATIVE: 1
SPEECH CHANGE: 0
CONSTIPATION: 0
RESPIRATORY NEGATIVE: 1
DEPRESSION: 0
WEAKNESS: 1
FOCAL WEAKNESS: 0
PALPITATIONS: 0
SHORTNESS OF BREATH: 0
INSOMNIA: 0

## 2020-02-22 NOTE — CARE PLAN
Problem: Hyperinflation:  Goal: Prevent or improve atelectasis  Outcome: PROGRESSING AS EXPECTED   IS QID   Best value 750mL

## 2020-02-22 NOTE — PROGRESS NOTES
Surgical Progress Note    Author: JOSE Gayle Date & Time created: 2020   9:41 AM     Interval Events:  POD#4 s/p robotic right hemicolectomy. Tolerating fulls without nausea/vomiting. Admits to minimal incisional abdominal pain, controlled with medication. Pt is ambulating and voiding.   Review of Systems   Constitutional: Negative for chills and fever.   HENT: Negative.    Eyes: Negative.    Respiratory: Negative.    Cardiovascular: Negative.    Gastrointestinal: Positive for abdominal pain. Negative for nausea and vomiting.        Reports pain when getting up, + BMs   Genitourinary: Negative.    Musculoskeletal: Negative.    Skin: Negative.    Neurological: Negative.    Endo/Heme/Allergies: Negative.    Psychiatric/Behavioral: Negative.      Hemodynamics:  Temp (24hrs), Av °C (98.6 °F), Min:36.5 °C (97.7 °F), Max:37.5 °C (99.5 °F)  Temperature: 37.5 °C (99.5 °F)  Pulse  Av.2  Min: 16  Max: 97   Blood Pressure : 116/46     Respiratory:    Respiration: 16, Pulse Oximetry: 96 %     Work Of Breathing / Effort: Mild  RUL Breath Sounds: Rhonchi, RML Breath Sounds: Rhonchi, RLL Breath Sounds: Crackles, ANABEL Breath Sounds: Rhonchi, LLL Breath Sounds: Crackles  Neuro:  GCS       Fluids:    Intake/Output Summary (Last 24 hours) at 2020 0941  Last data filed at 2020 1608  Gross per 24 hour   Intake 480 ml   Output --   Net 480 ml     Weight: 45 kg (99 lb 3.3 oz)  Current Diet Order   Procedures   • Diet Order Low Fiber(GI Soft)     Physical Exam  HENT:      Head: Normocephalic.      Comments: Ecchymotic areas on face     Nose: Nose normal.      Mouth/Throat:      Pharynx: Oropharynx is clear.   Eyes:      Conjunctiva/sclera: Conjunctivae normal.   Neck:      Musculoskeletal: Normal range of motion.   Cardiovascular:      Rate and Rhythm: Normal rate.   Pulmonary:      Effort: Pulmonary effort is normal.   Abdominal:      General: There is distension.      Palpations: Abdomen is soft.       Tenderness: There is abdominal tenderness.      Comments: Incisions c/d/i, staples intact, dressing removed, incisions can be open to air.  Large ecchymotic, warm area left abdomen/hip.   Musculoskeletal: Normal range of motion.         General: Swelling present.   Skin:     General: Skin is warm and dry.      Findings: Bruising present.   Neurological:      Mental Status: She is alert and oriented to person, place, and time.   Psychiatric:         Mood and Affect: Mood normal.       Labs:  Recent Results (from the past 24 hour(s))   CBC WITH DIFFERENTIAL    Collection Time: 02/22/20  3:25 AM   Result Value Ref Range    WBC 9.9 4.8 - 10.8 K/uL    RBC 3.22 (L) 4.20 - 5.40 M/uL    Hemoglobin 7.8 (L) 12.0 - 16.0 g/dL    Hematocrit 25.5 (L) 37.0 - 47.0 %    MCV 79.2 (L) 81.4 - 97.8 fL    MCH 24.2 (L) 27.0 - 33.0 pg    MCHC 30.6 (L) 33.6 - 35.0 g/dL    RDW 67.0 (H) 35.9 - 50.0 fL    Platelet Count 153 (L) 164 - 446 K/uL    MPV 10.1 9.0 - 12.9 fL    Neutrophils-Polys 76.70 (H) 44.00 - 72.00 %    Lymphocytes 9.30 (L) 22.00 - 41.00 %    Monocytes 10.20 0.00 - 13.40 %    Eosinophils 2.90 0.00 - 6.90 %    Basophils 0.30 0.00 - 1.80 %    Immature Granulocytes 0.60 0.00 - 0.90 %    Nucleated RBC 0.00 /100 WBC    Neutrophils (Absolute) 7.56 (H) 2.00 - 7.15 K/uL    Lymphs (Absolute) 0.92 (L) 1.00 - 4.80 K/uL    Monos (Absolute) 1.01 (H) 0.00 - 0.85 K/uL    Eos (Absolute) 0.29 0.00 - 0.51 K/uL    Baso (Absolute) 0.03 0.00 - 0.12 K/uL    Immature Granulocytes (abs) 0.06 0.00 - 0.11 K/uL    NRBC (Absolute) 0.00 K/uL   Basic Metabolic Panel    Collection Time: 02/22/20  3:25 AM   Result Value Ref Range    Sodium 132 (L) 135 - 145 mmol/L    Potassium 3.6 3.6 - 5.5 mmol/L    Chloride 98 96 - 112 mmol/L    Co2 29 20 - 33 mmol/L    Glucose 113 (H) 65 - 99 mg/dL    Bun 9 8 - 22 mg/dL    Creatinine 0.47 (L) 0.50 - 1.40 mg/dL    Calcium 8.2 (L) 8.5 - 10.5 mg/dL    Anion Gap 5.0 0.0 - 11.9   MAGNESIUM    Collection Time: 02/22/20  3:25  AM   Result Value Ref Range    Magnesium 1.8 1.5 - 2.5 mg/dL   ESTIMATED GFR    Collection Time: 02/22/20  3:25 AM   Result Value Ref Range    GFR If African American >60 >60 mL/min/1.73 m 2    GFR If Non African American >60 >60 mL/min/1.73 m 2     Medical Decision Making, by Problem:  Active Hospital Problems    Diagnosis   • Anemia requiring transfusions [D64.9]     Priority: High   • Colonic mass [K63.89]     Priority: Medium   • Acute pulmonary edema (HCC) [J81.0]     Priority: Medium   • Hypokalemia [E87.6]     Priority: Low   • PAD (peripheral artery disease) (HCC) [I73.9]     Priority: Low   • HTN (hypertension) [I10]     Priority: Low   • Pulmonary emphysema (HCC) [J43.9]     Priority: Low     Plan:  Pt is alert and oriented, NAD. Breathing unlabored. Tolerating PO, advance to soft diet.  Abdomen is soft, mildly distended, tender at incision sites. Incisions C/D/I. VS stable. Labs reviewed-Hgb 7.8. Encouraged ambulation and incentive spirometry. Transition to oral pain medications. Wean O2. Pt is frail, planning for discharge to SNF.  New area of bruising and warmth left abdomen/hip.  Not on blood thinners.  Denies pain.  Possible imaging for further evaluation.  Discussed with Dr. Yao.    Quality Measures:  Quality-Core Measures   Reviewed items::  Labs reviewed and Medications reviewed  Guzmán catheter::  No Guzmán  DVT prophylaxis pharmacological::  Contraindicated - High bleeding risk  DVT prophylaxis - mechanical:  SCDs  Ulcer Prophylaxis::  Yes      Discussed patient condition with RN, Patient and general surgery-Dr. Yao.

## 2020-02-22 NOTE — CARE PLAN
Problem: Oxygenation:  Goal: Maintain adequate oxygenation dependent on patient condition  Outcome: PROGRESSING SLOWER THAN EXPECTED     Pt participating in pep and IS, obtains 700 on IS and pep and is on 2L NC

## 2020-02-22 NOTE — PROGRESS NOTES
Patient refused bed alarm, educated regarding importance.  Education reinforced by XUAN Goodwin.  Patient continues to refuse.

## 2020-02-22 NOTE — CARE PLAN
Problem: Communication  Goal: The ability to communicate needs accurately and effectively will improve  Outcome: PROGRESSING AS EXPECTED  Intervention: Educate patient and significant other/support system about the plan of care, procedures, treatments, medications and allow for questions  Note: White board updated with POC and care team information during bedside report.      Problem: Safety  Goal: Will remain free from falls  Outcome: PROGRESSING AS EXPECTED  Intervention: Implement fall precautions  Flowsheets (Taken 2/21/2020 5018)  Environmental Precautions:   Treaded Slipper Socks on Patient   Personal Belongings, Wastebasket, Call Bell etc. in Easy Reach   Transferred to Stronger Side   Report Given to Other Health Care Providers Regarding Fall Risk   Bed in Low Position   Communication Sign for Patients & Families   Mobility Assessed & Appropriate Sign Placed  Chair/Bed Strip Alarm: Patient Educated Regarding Fall Risk and Need for Bed Alarm, Understands and Continues to Refuse

## 2020-02-22 NOTE — PROGRESS NOTES
"Hospital Medicine Daily Progress Note    Date of Service  2/22/2020    Chief Complaint  Severe anemia, transfer from [facility]    Hospital Course  Ms. Montanez is an 80 y/o female who initially presented to [facility] for staple removal from her head from a fall 1 week prior, when she was noted to look pale. They checked labs at that time and her hgb was severely low at 5.2. Stool guaiac was positive. They ordered 2 units of PRBC and requested transfer at that time since they do not have GI services available there. She follows with [facility] outpatient, who was contacted upon her transfer to [facility] and on 2/14/2020 the patient underwent EGD which was normal. Then on 2/16/2020 she had a colonoscopy done which showed a right colon mass. A biopsy was taken and a cold snare resection of a polyp from the sigmoid colon was done. A surgical consultation was obtained at that time and on 2/18/2020 a robotic assisted laparoscopic right hemicolectomy was done by Dr. Pat and Dr. Mitchell. Postoperatively she has done well and has remained on the telemetry floor due to her fluid overload, tenuous respiratory status, and for close monitoring.  Pathology results from both the colonoscopy and hemicolectomy biopsies showed benign tubular adenomas. There was no high-grade dysplasia or malignancy seen.  She was informed of these results on 2/21/2020.  Also on the day she received IV iron for iron deficiency anemia.  During her hospitalization she is also been treated for fluid overload, which she has tolerated well.    Interval Problem Update  Advanced to GI soft diet today by surgery. Has been getting into a chair for every meal & ambulating in the hallway with the RN. Reports her abdomen \"has gone down a lot\" (less bloating).     CBC and BMP today are stable.  H/H minimally improved, now 7.8/25.5.    Repeat chest x-ray done yesterday showed a stable small-moderate left and small right pleural effusions with associated bibasilar atelectasis " versus consolidations.    T-max 99 °F overnight, HR 70s-80s, SBP teens-140s, O2 saturations within the normal range on 2 L/min of oxygen via nasal cannula.    Consultants/Specialty  Gastroenterology-Dr. Rolon  General surgery-Dr. Pat    Code Status  Full code     Disposition  Pending SNF acceptance & medical clearance.     Review of Systems  Review of Systems   Constitutional: Positive for malaise/fatigue (improving daily). Negative for chills and fever.   Eyes: Negative for blurred vision, double vision, photophobia and pain.   Respiratory: Negative for cough, sputum production and shortness of breath.    Cardiovascular: Negative for chest pain, palpitations and claudication.   Gastrointestinal: Negative for abdominal pain, blood in stool, constipation, diarrhea, melena, nausea and vomiting.        Less bloating today   Genitourinary: Negative for dysuria, frequency, hematuria and urgency.   Neurological: Positive for weakness (improving daily). Negative for dizziness, sensory change, speech change, focal weakness and headaches.   Psychiatric/Behavioral: Negative for depression. The patient is not nervous/anxious and does not have insomnia.    All other systems reviewed and are negative.     Physical Exam  Temp:  [36.5 °C (97.7 °F)-37.5 °C (99.5 °F)] 37.5 °C (99.5 °F)  Pulse:  [74-84] 84  Resp:  [16-18] 16  BP: (114-144)/(46-67) 116/46  SpO2:  [92 %-97 %] 94 %    Physical Exam  Vitals signs and nursing note reviewed.   Constitutional:       General: She is not in acute distress.     Appearance: Normal appearance. She is underweight. She is ill-appearing (chronically ill-appearing).      Interventions: Nasal cannula in place.   HENT:      Head: Normocephalic and atraumatic.      Nose: No congestion.   Eyes:      Conjunctiva/sclera: Conjunctivae normal.      Pupils: Pupils are equal, round, and reactive to light.   Neck:      Musculoskeletal: Normal range of motion and neck supple.   Cardiovascular:      Rate and  Rhythm: Normal rate and regular rhythm.      Pulses: Normal pulses.      Heart sounds: Normal heart sounds.   Pulmonary:      Effort: Pulmonary effort is normal.      Breath sounds: Examination of the right-lower field reveals decreased breath sounds. Examination of the left-lower field reveals decreased breath sounds. Decreased breath sounds present. No wheezing, rhonchi or rales.   Abdominal:      General: Bowel sounds are normal. There is distension (mild).      Palpations: Abdomen is soft.      Tenderness: There is no abdominal tenderness.       Musculoskeletal: Normal range of motion.         General: No swelling, tenderness, deformity or signs of injury.      Right lower leg: No edema.      Left lower leg: No edema.   Skin:     General: Skin is warm and dry.      Coloration: Skin is pale. Skin is not jaundiced.      Findings: No bruising, erythema or rash.   Neurological:      General: No focal deficit present.      Mental Status: She is alert and oriented to person, place, and time.      Motor: Weakness present.   Psychiatric:         Attention and Perception: Attention and perception normal.         Mood and Affect: Mood normal.         Speech: Speech normal.         Behavior: Behavior normal. Behavior is cooperative.         Thought Content: Thought content normal.         Cognition and Memory: Cognition and memory normal.         Judgment: Judgment normal.     Fluids    Intake/Output Summary (Last 24 hours) at 2/22/2020 1345  Last data filed at 2/21/2020 1608  Gross per 24 hour   Intake 240 ml   Output no documentation   Net 240 ml     Laboratory  Recent Labs     02/20/20  1227 02/21/20  0223 02/22/20  0325   WBC 11.4* 12.7* 9.9   RBC 3.23* 3.20* 3.22*   HEMOGLOBIN 7.6* 7.5* 7.8*   HEMATOCRIT 26.4* 25.8* 25.5*   MCV 81.7 80.6* 79.2*   MCH 23.5* 23.4* 24.2*   MCHC 28.8* 29.1* 30.6*   RDW 69.7* 68.1* 67.0*   PLATELETCT 115* 119* 153*   MPV 10.8 9.9 10.1     Recent Labs     02/20/20  0134 02/21/20 0223  02/22/20  0325   SODIUM 134* 132* 132*   POTASSIUM 3.4* 4.0 3.6   CHLORIDE 99 100 98   CO2 28 28 29   GLUCOSE 113* 154* 113*   BUN 17 11 9   CREATININE 0.70 0.50 0.47*   CALCIUM 8.1* 8.1* 8.2*     Imaging  DX-HIP-UNILATERAL-WITH PELVIS-1 VIEW LEFT   Final Result      1.  Osteoarthritis of both hip joint, both sacroiliac joints, and lumbar spine facet joint      2.  Calcified uterine fibroid      3.  Extensive atherosclerotic plaque      DX-CHEST-LIMITED (1 VIEW)   Final Result      Stable small-to-moderate left and small right pleural effusions and associated bibasilar atelectasis versus consolidations. Minimal interstitial edema.      DX-CHEST-PORTABLE (1 VIEW)   Final Result      1.  Worsening bibasilar consolidation and/or pleural fluid, particularly in the LEFT.   2.  Minimal interval improvement of pulmonary edema.   3.  No other significant change from prior exam.         EC-ECHOCARDIOGRAM COMPLETE W/O CONT   Final Result      DX-CHEST-2 VIEWS   Final Result      1.  Bilateral interstitial opacities consistent with pulmonary edema. Small bilateral pleural effusions.   2.  Mild cardiac silhouette enlargement.   3.  Emphysematous changes.         Assessment/Plan  Anemia requiring transfusions- (present on admission)  Assessment & Plan  -Multifactorial, acute blood loss + iron deficiency.   -S/p 2 units PRBCs 2/17/2020, s/p IV iron 2/21/2020.   -Hgb stable at 7.8 today.   -Recheck CBC tomorrow.     Colonic mass- (present on admission)  Assessment & Plan  -S/p right hemicolectomy on 2/18/2020. Pathology showed benign adenoma.   -Advance diet per surgery.   -Surgical incisions uncomplicated.     Acute pulmonary edema (HCC)  Assessment & Plan  -Stable today. On room air at time of exam.   -Repeat cxr done 2/21/2020 showed a stable small/mod left and small right pleural effusions.   -Echo largely WNL. EF normal.   -BP's stabilized, continue furosemide. Monitor response.     Pulmonary emphysema (HCC)- (present on  admission)  Assessment & Plan  -On room air at time of exam.   -Continue bronchodilators prn.   -Continue to encourage mobility and incentive spirometry.    -PEP therapy added 2/19/2020.    HTN (hypertension)- (present on admission)  Assessment & Plan  -Home lisinopril and lopressor held for allow BP room for effective diuresis. Teens-140s overnight.   -Continue to trend per unit protocol.     PAD (peripheral artery disease) ([facility])- (present on admission)  Assessment & Plan  -S/p angioplasty in December with Dr. Mohsin of vascular surgery.  Has been taking aspirin and plavix at home, which are currently being held in light of her GI bleed & anemia.  -Continue home atorvastatin.      VTE prophylaxis: SCDs and ambulation only given her recent GI bleed and anemia requiring transfusions.      Electronically signed by:  Lorelei Wasserman, MSN, RN, APRN, ACNPC-AG, CCRN  Nurse Practitioner, [facility]  Work # [phone]    2/22/2020    1:45 PM

## 2020-02-22 NOTE — DISCHARGE PLANNING
Anticipated Discharge Disposition: SNF    Action: RN SHANI spoke with BS RN, Tamara, and patient is not medically cleared for discharge to SNF today.    Barriers to Discharge: medical clearance    Plan: Case coordination available for discharge needs

## 2020-02-23 LAB
ANION GAP SERPL CALC-SCNC: 8 MMOL/L (ref 0–11.9)
ANISOCYTOSIS BLD QL SMEAR: ABNORMAL
BASOPHILS # BLD AUTO: 0.3 % (ref 0–1.8)
BASOPHILS # BLD: 0.02 K/UL (ref 0–0.12)
BUN SERPL-MCNC: 9 MG/DL (ref 8–22)
CALCIUM SERPL-MCNC: 8 MG/DL (ref 8.5–10.5)
CHLORIDE SERPL-SCNC: 101 MMOL/L (ref 96–112)
CO2 SERPL-SCNC: 28 MMOL/L (ref 20–33)
COMMENT 1642: NORMAL
CREAT SERPL-MCNC: 0.41 MG/DL (ref 0.5–1.4)
EOSINOPHIL # BLD AUTO: 0.18 K/UL (ref 0–0.51)
EOSINOPHIL NFR BLD: 2.3 % (ref 0–6.9)
ERYTHROCYTE [DISTWIDTH] IN BLOOD BY AUTOMATED COUNT: 69.9 FL (ref 35.9–50)
GLUCOSE SERPL-MCNC: 107 MG/DL (ref 65–99)
HCT VFR BLD AUTO: 25.8 % (ref 37–47)
HGB BLD-MCNC: 7.5 G/DL (ref 12–16)
HYPOCHROMIA BLD QL SMEAR: ABNORMAL
IMM GRANULOCYTES # BLD AUTO: 0.06 K/UL (ref 0–0.11)
IMM GRANULOCYTES NFR BLD AUTO: 0.8 % (ref 0–0.9)
LYMPHOCYTES # BLD AUTO: 0.93 K/UL (ref 1–4.8)
LYMPHOCYTES NFR BLD: 11.9 % (ref 22–41)
MCH RBC QN AUTO: 23.6 PG (ref 27–33)
MCHC RBC AUTO-ENTMCNC: 29.1 G/DL (ref 33.6–35)
MCV RBC AUTO: 81.1 FL (ref 81.4–97.8)
MICROCYTES BLD QL SMEAR: ABNORMAL
MONOCYTES # BLD AUTO: 1.09 K/UL (ref 0–0.85)
MONOCYTES NFR BLD AUTO: 13.9 % (ref 0–13.4)
MORPHOLOGY BLD-IMP: NORMAL
NEUTROPHILS # BLD AUTO: 5.55 K/UL (ref 2–7.15)
NEUTROPHILS NFR BLD: 70.8 % (ref 44–72)
NRBC # BLD AUTO: 0 K/UL
NRBC BLD-RTO: 0 /100 WBC
PLATELET # BLD AUTO: 192 K/UL (ref 164–446)
PLATELET BLD QL SMEAR: NORMAL
PMV BLD AUTO: 10.2 FL (ref 9–12.9)
POIKILOCYTOSIS BLD QL SMEAR: NORMAL
POLYCHROMASIA BLD QL SMEAR: NORMAL
POTASSIUM SERPL-SCNC: 3.2 MMOL/L (ref 3.6–5.5)
RBC # BLD AUTO: 3.18 M/UL (ref 4.2–5.4)
RBC BLD AUTO: PRESENT
SODIUM SERPL-SCNC: 137 MMOL/L (ref 135–145)
TARGETS BLD QL SMEAR: NORMAL
WBC # BLD AUTO: 7.8 K/UL (ref 4.8–10.8)

## 2020-02-23 PROCEDURE — A9270 NON-COVERED ITEM OR SERVICE: HCPCS | Performed by: HOSPITALIST

## 2020-02-23 PROCEDURE — 85025 COMPLETE CBC W/AUTO DIFF WBC: CPT

## 2020-02-23 PROCEDURE — 80048 BASIC METABOLIC PNL TOTAL CA: CPT

## 2020-02-23 PROCEDURE — 700102 HCHG RX REV CODE 250 W/ 637 OVERRIDE(OP): Performed by: HOSPITALIST

## 2020-02-23 PROCEDURE — 700102 HCHG RX REV CODE 250 W/ 637 OVERRIDE(OP): Performed by: SURGERY

## 2020-02-23 PROCEDURE — 99232 SBSQ HOSP IP/OBS MODERATE 35: CPT | Performed by: HOSPITALIST

## 2020-02-23 PROCEDURE — 700102 HCHG RX REV CODE 250 W/ 637 OVERRIDE(OP): Performed by: NURSE PRACTITIONER

## 2020-02-23 PROCEDURE — 770020 HCHG ROOM/CARE - TELE (206)

## 2020-02-23 PROCEDURE — A9270 NON-COVERED ITEM OR SERVICE: HCPCS | Performed by: SURGERY

## 2020-02-23 PROCEDURE — 700111 HCHG RX REV CODE 636 W/ 250 OVERRIDE (IP): Performed by: HOSPITALIST

## 2020-02-23 PROCEDURE — 36415 COLL VENOUS BLD VENIPUNCTURE: CPT

## 2020-02-23 PROCEDURE — A9270 NON-COVERED ITEM OR SERVICE: HCPCS | Performed by: NURSE PRACTITIONER

## 2020-02-23 RX ORDER — POTASSIUM CHLORIDE 20 MEQ/1
40 TABLET, EXTENDED RELEASE ORAL ONCE
Status: COMPLETED | OUTPATIENT
Start: 2020-02-23 | End: 2020-02-23

## 2020-02-23 RX ADMIN — GABAPENTIN 100 MG: 100 CAPSULE ORAL at 12:09

## 2020-02-23 RX ADMIN — CEPHALEXIN 500 MG: 500 CAPSULE ORAL at 17:42

## 2020-02-23 RX ADMIN — FUROSEMIDE 20 MG: 10 INJECTION, SOLUTION INTRAMUSCULAR; INTRAVENOUS at 05:01

## 2020-02-23 RX ADMIN — OMEPRAZOLE 20 MG: 20 CAPSULE, DELAYED RELEASE ORAL at 05:01

## 2020-02-23 RX ADMIN — GABAPENTIN 100 MG: 100 CAPSULE ORAL at 17:41

## 2020-02-23 RX ADMIN — GUAIFENESIN 600 MG: 600 TABLET, EXTENDED RELEASE ORAL at 17:42

## 2020-02-23 RX ADMIN — BUSPIRONE HYDROCHLORIDE 5 MG: 10 TABLET ORAL at 05:02

## 2020-02-23 RX ADMIN — GUAIFENESIN 600 MG: 600 TABLET, EXTENDED RELEASE ORAL at 05:01

## 2020-02-23 RX ADMIN — NYSTATIN 500000 UNITS: 100000 SUSPENSION ORAL at 12:09

## 2020-02-23 RX ADMIN — CEPHALEXIN 500 MG: 500 CAPSULE ORAL at 00:12

## 2020-02-23 RX ADMIN — NYSTATIN 500000 UNITS: 100000 SUSPENSION ORAL at 22:01

## 2020-02-23 RX ADMIN — SENNOSIDES AND DOCUSATE SODIUM 2 TABLET: 8.6; 5 TABLET ORAL at 05:01

## 2020-02-23 RX ADMIN — ATORVASTATIN CALCIUM 80 MG: 80 TABLET, FILM COATED ORAL at 05:01

## 2020-02-23 RX ADMIN — CEPHALEXIN 500 MG: 500 CAPSULE ORAL at 05:01

## 2020-02-23 RX ADMIN — BUSPIRONE HYDROCHLORIDE 5 MG: 10 TABLET ORAL at 14:03

## 2020-02-23 RX ADMIN — NYSTATIN 500000 UNITS: 100000 SUSPENSION ORAL at 09:55

## 2020-02-23 RX ADMIN — ACETAMINOPHEN 650 MG: 325 TABLET, FILM COATED ORAL at 12:09

## 2020-02-23 RX ADMIN — POTASSIUM CHLORIDE 40 MEQ: 1500 TABLET, EXTENDED RELEASE ORAL at 09:55

## 2020-02-23 RX ADMIN — BUSPIRONE HYDROCHLORIDE 5 MG: 10 TABLET ORAL at 22:01

## 2020-02-23 RX ADMIN — CEPHALEXIN 500 MG: 500 CAPSULE ORAL at 23:48

## 2020-02-23 RX ADMIN — CEPHALEXIN 500 MG: 500 CAPSULE ORAL at 12:11

## 2020-02-23 RX ADMIN — GABAPENTIN 100 MG: 100 CAPSULE ORAL at 05:01

## 2020-02-23 RX ADMIN — FUROSEMIDE 20 MG: 10 INJECTION, SOLUTION INTRAMUSCULAR; INTRAVENOUS at 17:42

## 2020-02-23 RX ADMIN — OXYCODONE HYDROCHLORIDE 10 MG: 5 TABLET ORAL at 10:01

## 2020-02-23 RX ADMIN — OXYCODONE HYDROCHLORIDE 10 MG: 5 TABLET ORAL at 14:03

## 2020-02-23 ASSESSMENT — ENCOUNTER SYMPTOMS
SHORTNESS OF BREATH: 0
VOMITING: 0
NAUSEA: 0
FOCAL WEAKNESS: 0
WEAKNESS: 1
DIARRHEA: 0
ABDOMINAL PAIN: 0
INSOMNIA: 0
FEVER: 0
HEADACHES: 0
PALPITATIONS: 0
SENSORY CHANGE: 0
CHILLS: 0
BLOOD IN STOOL: 0
COUGH: 0
SPEECH CHANGE: 0
DEPRESSION: 0
DIZZINESS: 0
CONSTIPATION: 0
SPUTUM PRODUCTION: 0
NERVOUS/ANXIOUS: 0

## 2020-02-23 NOTE — PROGRESS NOTES
[facility] Medicine Daily Progress Note    Date of Service  2/23/2020    Chief Complaint  Severe anemia, transfer from [facility]    Hospital Course  Ms. Montanez is an 78 y/o female who initially presented to [facility] for staple removal from her head from a fall 1 week prior, when she was noted to look pale. They checked labs at that time and her hgb was severely low at 5.2. Stool guaiac was positive. They ordered 2 units of PRBC and requested transfer at that time since they do not have GI services available there. She follows with [facility] outpatient, who was contacted upon her transfer to [facility] and on 2/14/2020 the patient underwent EGD which was normal. Then on 2/16/2020 she had a colonoscopy done which showed a right colon mass. A biopsy was taken and a cold snare resection of a polyp from the sigmoid colon was done. A surgical consultation was obtained at that time and on 2/18/2020 a robotic assisted laparoscopic right hemicolectomy was done by Dr. Pat and Dr. Mitchell. Postoperatively she has done well and has remained on the telemetry floor due to her fluid overload, tenuous respiratory status, and for close monitoring.  Pathology results from both the colonoscopy and hemicolectomy biopsies showed benign tubular adenomas. There was no high-grade dysplasia or malignancy seen.  She was informed of these results on 2/21/2020.  Also on the day she received IV iron for iron deficiency anemia.  During her hospitalization she is also been treated for fluid overload, which she has tolerated well.    Interval Problem Update  Pain controlled. Just has a little discomfort at the surgical site. No SOB/dyspnea. Tolerating a GI soft diet.     H/H stable at 7.5/25.8.  Thrombocytosis resolved today.  Potassium low at 3.2 and has been repleted.  Left hip and pelvis x-ray done yesterday showed osteoarthritis of both hip joints, both sacroiliac joints, and the lumbar spine facet joint.    T-max 99.8 °F overnight, HR 70s-80s, SBP  "90s-120s, O2 saturations within the normal range on 2-2.5 L/min of oxygen via nasal cannula.    Consultants/Specialty  Gastroenterology-Dr. Rolon  General surgery-Dr. Pat    Code Status  Full code     Disposition  Pending SNF acceptance.    Review of Systems  Review of Systems   Constitutional: Positive for malaise/fatigue. Negative for chills and fever.   Respiratory: Negative for cough, sputum production and shortness of breath.    Cardiovascular: Negative for chest pain, palpitations and leg swelling.   Gastrointestinal: Negative for abdominal pain, blood in stool, constipation, diarrhea (\"soft\"), melena, nausea and vomiting.        Bloating continues to decrease.    Genitourinary: Negative for dysuria, frequency and urgency.   Neurological: Positive for weakness. Negative for dizziness, sensory change, speech change, focal weakness and headaches.   Psychiatric/Behavioral: Negative for depression. The patient is not nervous/anxious and does not have insomnia.    All other systems reviewed and are negative.     Physical Exam  Temp:  [36.1 °C (97 °F)-37.7 °C (99.8 °F)] 37.6 °C (99.6 °F)  Pulse:  [78-90] 90  Resp:  [16] 16  BP: ()/(51-86) 140/73  SpO2:  [92 %-97 %] 92 %    Physical Exam  Vitals signs and nursing note reviewed.   Constitutional:       General: She is awake. She is not in acute distress.     Appearance: Normal appearance. She is underweight. She is ill-appearing (chronically ill-appearing).      Interventions: Nasal cannula in place.   HENT:      Head: Normocephalic and atraumatic.      Nose: No congestion.   Eyes:      Pupils: Pupils are equal, round, and reactive to light.   Neck:      Musculoskeletal: Normal range of motion and neck supple.   Cardiovascular:      Rate and Rhythm: Normal rate and regular rhythm.      Pulses: Normal pulses.      Heart sounds: Normal heart sounds.   Pulmonary:      Effort: Pulmonary effort is normal.      Breath sounds: Normal breath sounds. No decreased " breath sounds, wheezing, rhonchi or rales.   Abdominal:      General: Bowel sounds are normal. There is distension (nearly resolved).      Palpations: Abdomen is soft.      Tenderness: There is no abdominal tenderness.       Musculoskeletal: Normal range of motion.         General: No swelling, tenderness, deformity or signs of injury.      Right lower leg: No edema.      Left lower leg: No edema.   Skin:     General: Skin is warm and dry.      Coloration: Skin is pale. Skin is not jaundiced.      Findings: No bruising, erythema or rash.   Neurological:      General: No focal deficit present.      Mental Status: She is alert and oriented to person, place, and time.      GCS: GCS eye subscore is 4. GCS verbal subscore is 5. GCS motor subscore is 6.      Motor: Weakness present.   Psychiatric:         Attention and Perception: Attention and perception normal.         Mood and Affect: Mood normal.         Speech: Speech normal.         Behavior: Behavior normal. Behavior is cooperative.         Thought Content: Thought content normal.         Cognition and Memory: Cognition and memory normal.         Judgment: Judgment normal.     Fluids    Intake/Output Summary (Last 24 hours) at 2/23/2020 1505  Last data filed at 2/23/2020 0827  Gross per 24 hour   Intake 480 ml   Output no documentation   Net 480 ml     Laboratory  Recent Labs     02/21/20 0223 02/22/20  0325 02/23/20  0305   WBC 12.7* 9.9 7.8   RBC 3.20* 3.22* 3.18*   HEMOGLOBIN 7.5* 7.8* 7.5*   HEMATOCRIT 25.8* 25.5* 25.8*   MCV 80.6* 79.2* 81.1*   MCH 23.4* 24.2* 23.6*   MCHC 29.1* 30.6* 29.1*   RDW 68.1* 67.0* 69.9*   PLATELETCT 119* 153* 192   MPV 9.9 10.1 10.2     Recent Labs     02/21/20 0223 02/22/20  0325 02/23/20  0305   SODIUM 132* 132* 137   POTASSIUM 4.0 3.6 3.2*   CHLORIDE 100 98 101   CO2 28 29 28   GLUCOSE 154* 113* 107*   BUN 11 9 9   CREATININE 0.50 0.47* 0.41*   CALCIUM 8.1* 8.2* 8.0*     Imaging  DX-HIP-UNILATERAL-WITH PELVIS-1 VIEW LEFT    Final Result      1.  Osteoarthritis of both hip joint, both sacroiliac joints, and lumbar spine facet joint      2.  Calcified uterine fibroid      3.  Extensive atherosclerotic plaque      DX-CHEST-LIMITED (1 VIEW)   Final Result      Stable small-to-moderate left and small right pleural effusions and associated bibasilar atelectasis versus consolidations. Minimal interstitial edema.      DX-CHEST-PORTABLE (1 VIEW)   Final Result      1.  Worsening bibasilar consolidation and/or pleural fluid, particularly in the LEFT.   2.  Minimal interval improvement of pulmonary edema.   3.  No other significant change from prior exam.         EC-ECHOCARDIOGRAM COMPLETE W/O CONT   Final Result      DX-CHEST-2 VIEWS   Final Result      1.  Bilateral interstitial opacities consistent with pulmonary edema. Small bilateral pleural effusions.   2.  Mild cardiac silhouette enlargement.   3.  Emphysematous changes.         Assessment/Plan  Anemia requiring transfusions- (present on admission)  Assessment & Plan  -Multifactorial, acute blood loss + iron deficiency.   -S/p 2 units PRBCs 2/17/2020, s/p IV iron 2/21/2020.   -Hgb stable at 7.8 today.   -Recheck CBC tomorrow.     Colonic mass- (present on admission)  Assessment & Plan  -S/p right hemicolectomy on 2/18/2020. Pathology showed benign adenoma.   -Advance diet per surgery. Tolerating GI soft diet.   -Surgical incisions uncomplicated.     Acute pulmonary edema (HCC)  Assessment & Plan  -Stable today. On room air at time of exam.   -Repeat cxr done 2/21/2020 showed a stable small/mod left and small right pleural effusions. Repeat xray tomorrow to monitor progress.   -Echo largely WNL. EF normal.   -BP's stabilized, continue furosemide. Monitor response.     Pulmonary emphysema (HCC)- (present on admission)  Assessment & Plan  -Wean off O2 as tolerated.   -Continue bronchodilators prn.   -Continue to encourage mobility and incentive spirometry.    -PEP therapy added  2/19/2020.    HTN (hypertension)- (present on admission)  Assessment & Plan  -Home lisinopril and lopressor held for allow BP room for effective diuresis. 90s-120s overnight.   -Continue to trend per unit protocol.     PAD (peripheral artery disease) (HCC)- (present on admission)  Assessment & Plan  -S/p angioplasty in December with Dr. Mohsin of vascular surgery.  Has been taking aspirin and plavix at home, which are currently being held in light of her GI bleed & anemia.  -Continue home atorvastatin.      VTE prophylaxis: SCDs and ambulation only given her recent GI bleed and anemia requiring transfusions.      Electronically signed by:  Lorelei Wasserman, MSN, RN, APRN, ACNPC-AG, CCRN  Nurse Practitioner, [facility]  Work # [phone]    2/23/2020    3:05 PM

## 2020-02-23 NOTE — PROGRESS NOTES
Patient seen and examined.  Sitting up in bed, eating breakfast.  Tolerating GI soft diet.  Voiding, passing flatus and BMs.  VSS and labs stable.  Surgical stables intact.  On oxygen nasal cannula.  On antibiotics for cellulitis.  Continue when transferred to SNF.  Discussed with Dr. Yao, call with questions.

## 2020-02-23 NOTE — CARE PLAN
Problem: Safety  Goal: Will remain free from injury  Outcome: PROGRESSING AS EXPECTED  Note: Patient educated about risk of falls and reasoning for use of tread socks, calling for help, and bed alarms.        Problem: Venous Thromboembolism (VTW)/Deep Vein Thrombosis (DVT) Prevention:  Goal: Patient will participate in Venous Thrombosis (VTE)/Deep Vein Thrombosis (DVT)Prevention Measures  Outcome: PROGRESSING AS EXPECTED  Note: Patient requesting scd's be taken off. RN educate pt on importance of keeping them on. Pt agreed and scds in place. Ambulation to bathroom

## 2020-02-24 ENCOUNTER — APPOINTMENT (OUTPATIENT)
Dept: RADIOLOGY | Facility: MEDICAL CENTER | Age: 80
DRG: 329 | End: 2020-02-24
Attending: HOSPITALIST
Payer: MEDICARE

## 2020-02-24 VITALS
TEMPERATURE: 99.3 F | SYSTOLIC BLOOD PRESSURE: 130 MMHG | OXYGEN SATURATION: 90 % | WEIGHT: 100.97 LBS | HEART RATE: 72 BPM | HEIGHT: 62 IN | DIASTOLIC BLOOD PRESSURE: 59 MMHG | BODY MASS INDEX: 18.58 KG/M2 | RESPIRATION RATE: 16 BRPM

## 2020-02-24 PROBLEM — D64.9 ANEMIA REQUIRING TRANSFUSIONS: Status: RESOLVED | Noted: 2020-02-14 | Resolved: 2020-02-24

## 2020-02-24 PROBLEM — D50.9 IRON DEFICIENCY ANEMIA: Status: ACTIVE | Noted: 2020-02-24

## 2020-02-24 LAB
BASOPHILS # BLD AUTO: 0.1 % (ref 0–1.8)
BASOPHILS # BLD: 0.01 K/UL (ref 0–0.12)
EOSINOPHIL # BLD AUTO: 0.17 K/UL (ref 0–0.51)
EOSINOPHIL NFR BLD: 1.7 % (ref 0–6.9)
ERYTHROCYTE [DISTWIDTH] IN BLOOD BY AUTOMATED COUNT: 68.6 FL (ref 35.9–50)
HCT VFR BLD AUTO: 24.7 % (ref 37–47)
HGB BLD-MCNC: 7.5 G/DL (ref 12–16)
IMM GRANULOCYTES # BLD AUTO: 0.06 K/UL (ref 0–0.11)
IMM GRANULOCYTES NFR BLD AUTO: 0.6 % (ref 0–0.9)
LYMPHOCYTES # BLD AUTO: 0.9 K/UL (ref 1–4.8)
LYMPHOCYTES NFR BLD: 9.1 % (ref 22–41)
MCH RBC QN AUTO: 24.1 PG (ref 27–33)
MCHC RBC AUTO-ENTMCNC: 30.4 G/DL (ref 33.6–35)
MCV RBC AUTO: 79.4 FL (ref 81.4–97.8)
MONOCYTES # BLD AUTO: 1.35 K/UL (ref 0–0.85)
MONOCYTES NFR BLD AUTO: 13.7 % (ref 0–13.4)
NEUTROPHILS # BLD AUTO: 7.38 K/UL (ref 2–7.15)
NEUTROPHILS NFR BLD: 74.8 % (ref 44–72)
NRBC # BLD AUTO: 0 K/UL
NRBC BLD-RTO: 0 /100 WBC
PLATELET # BLD AUTO: 247 K/UL (ref 164–446)
PMV BLD AUTO: 9.7 FL (ref 9–12.9)
POTASSIUM SERPL-SCNC: 3.8 MMOL/L (ref 3.6–5.5)
RBC # BLD AUTO: 3.11 M/UL (ref 4.2–5.4)
WBC # BLD AUTO: 9.9 K/UL (ref 4.8–10.8)

## 2020-02-24 PROCEDURE — A9270 NON-COVERED ITEM OR SERVICE: HCPCS | Performed by: HOSPITALIST

## 2020-02-24 PROCEDURE — 700102 HCHG RX REV CODE 250 W/ 637 OVERRIDE(OP): Performed by: HOSPITALIST

## 2020-02-24 PROCEDURE — 700102 HCHG RX REV CODE 250 W/ 637 OVERRIDE(OP): Performed by: NURSE PRACTITIONER

## 2020-02-24 PROCEDURE — 700102 HCHG RX REV CODE 250 W/ 637 OVERRIDE(OP): Performed by: SURGERY

## 2020-02-24 PROCEDURE — 71045 X-RAY EXAM CHEST 1 VIEW: CPT

## 2020-02-24 PROCEDURE — A9270 NON-COVERED ITEM OR SERVICE: HCPCS | Performed by: NURSE PRACTITIONER

## 2020-02-24 PROCEDURE — 85025 COMPLETE CBC W/AUTO DIFF WBC: CPT

## 2020-02-24 PROCEDURE — 99239 HOSP IP/OBS DSCHRG MGMT >30: CPT | Performed by: HOSPITALIST

## 2020-02-24 PROCEDURE — 36415 COLL VENOUS BLD VENIPUNCTURE: CPT

## 2020-02-24 PROCEDURE — 84132 ASSAY OF SERUM POTASSIUM: CPT

## 2020-02-24 PROCEDURE — A9270 NON-COVERED ITEM OR SERVICE: HCPCS | Performed by: SURGERY

## 2020-02-24 RX ORDER — FUROSEMIDE 20 MG/1
20 TABLET ORAL 2 TIMES DAILY
Qty: 60 TAB
Start: 2020-02-24

## 2020-02-24 RX ORDER — CEPHALEXIN 500 MG/1
500 CAPSULE ORAL EVERY 6 HOURS
Qty: 12 CAP | Refills: 0
Start: 2020-02-24 | End: 2020-02-27

## 2020-02-24 RX ORDER — ASPIRIN 81 MG/1
81 TABLET, CHEWABLE ORAL DAILY
Qty: 100 TAB
Start: 2020-02-24

## 2020-02-24 RX ORDER — GABAPENTIN 100 MG/1
100 CAPSULE ORAL 3 TIMES DAILY
Qty: 90 CAP
Start: 2020-02-24

## 2020-02-24 RX ORDER — OXYCODONE HYDROCHLORIDE 5 MG/1
5 TABLET ORAL EVERY 8 HOURS PRN
Qty: 21 TAB | Refills: 0 | Status: SHIPPED | OUTPATIENT
Start: 2020-02-24 | End: 2020-03-02

## 2020-02-24 RX ORDER — FUROSEMIDE 20 MG/1
20 TABLET ORAL
Status: DISCONTINUED | OUTPATIENT
Start: 2020-02-24 | End: 2020-02-24 | Stop reason: HOSPADM

## 2020-02-24 RX ORDER — ATORVASTATIN CALCIUM 80 MG/1
80 TABLET, FILM COATED ORAL DAILY
Qty: 30 TAB
Start: 2020-02-25

## 2020-02-24 RX ADMIN — OMEPRAZOLE 20 MG: 20 CAPSULE, DELAYED RELEASE ORAL at 04:55

## 2020-02-24 RX ADMIN — CEPHALEXIN 500 MG: 500 CAPSULE ORAL at 04:55

## 2020-02-24 RX ADMIN — ATORVASTATIN CALCIUM 80 MG: 80 TABLET, FILM COATED ORAL at 04:55

## 2020-02-24 RX ADMIN — GABAPENTIN 100 MG: 100 CAPSULE ORAL at 11:52

## 2020-02-24 RX ADMIN — BUSPIRONE HYDROCHLORIDE 5 MG: 10 TABLET ORAL at 04:55

## 2020-02-24 RX ADMIN — OXYCODONE HYDROCHLORIDE 5 MG: 5 TABLET ORAL at 15:42

## 2020-02-24 RX ADMIN — GABAPENTIN 100 MG: 100 CAPSULE ORAL at 04:55

## 2020-02-24 RX ADMIN — FUROSEMIDE 20 MG: 20 TABLET ORAL at 09:01

## 2020-02-24 RX ADMIN — CEPHALEXIN 500 MG: 500 CAPSULE ORAL at 11:52

## 2020-02-24 RX ADMIN — GUAIFENESIN 600 MG: 600 TABLET, EXTENDED RELEASE ORAL at 04:55

## 2020-02-24 NOTE — PROGRESS NOTES
Patient care assumed, bedside report received, POC discussed, verbalizes understanding. Spouse at bedside. Patient sitting up in bed eating dinner. Safety measures in place, call light in place.

## 2020-02-24 NOTE — DISCHARGE PLANNING
Spoke To: Kay  Agency/Facility Name: [facility]  Plan or Request: patient accepted, bed available today.      Spoke to Kay  Transport is scheduled for today at 1600 going to [place].    Transport by [place] van.    *time updated from 1500 to 1600*

## 2020-02-24 NOTE — DISCHARGE INSTRUCTIONS
Discharge Instructions    Discharged to other by medical transportation with escort. Discharged via wheelchair, hospital escort: Yes.  Special equipment needed: Not Applicable    Be sure to schedule a follow-up appointment with your primary care doctor or any specialists as instructed.     Discharge Plan:   Smoking Cessation Offered: Patient Refused  Influenza Vaccine Indication: Patient Refuses    I understand that a diet low in cholesterol, fat, and sodium is recommended for good health. Unless I have been given specific instructions below for another diet, I accept this instruction as my diet prescription.   Other diet: low fiber GI soft    Special Instructions: Oxycodone tablets or capsules  What is this medicine?  OXYCODONE (ox i KOE done) is a pain reliever. It is used to treat moderate to severe pain.  This medicine may be used for other purposes; ask your health care provider or pharmacist if you have questions.  COMMON BRAND NAME(S): Dazidox, Endocodone, Oxaydo, OXECTA, OxyIR, Percolone, Roxicodone, ROXYBOND  What should I tell my health care provider before I take this medicine?  They need to know if you have any of these conditions:  -Chariton's disease  -brain tumor  -head injury  -heart disease  -history of drug or alcohol abuse problem  -if you often drink alcohol  -kidney disease  -liver disease  -lung or breathing disease, like asthma  -mental illness  -pancreatic disease  -seizures  -thyroid disease  -an unusual or allergic reaction to oxycodone, codeine, hydrocodone, morphine, other medicines, foods, dyes, or preservatives  -pregnant or trying to get pregnant  -breast-feeding  How should I use this medicine?  Take this medicine by mouth with a glass of water. Follow the directions on the prescription label. You can take it with or without food. If it upsets your stomach, take it with food. Take your medicine at regular intervals. Do not take it more often than directed. Do not stop taking except on  your doctor's advice.  Some brands of this medicine, like Oxecta, have special instructions. Ask your doctor or pharmacist if these directions are for you: Do not cut, crush or chew this medicine. Swallow only one tablet at a time. Do not wet, soak, or lick the tablet before you take it.  A special MedGuide will be given to you by the pharmacist with each prescription and refill. Be sure to read this information carefully each time.  Talk to your pediatrician regarding the use of this medicine in children. Special care may be needed.  Overdosage: If you think you have taken too much of this medicine contact a poison control center or emergency room at once.  NOTE: This medicine is only for you. Do not share this medicine with others.  What if I miss a dose?  If you miss a dose, take it as soon as you can. If it is almost time for your next dose, take only that dose. Do not take double or extra doses.  What may interact with this medicine?  This medicine may interact with the following medications:  -alcohol  -antihistamines for allergy, cough and cold  -antiviral medicines for HIV or AIDS  -atropine  -certain antibiotics like clarithromycin, erythromycin, linezolid, rifampin  -certain medicines for anxiety or sleep  -certain medicines for bladder problems like oxybutynin, tolterodine  -certain medicines for depression like amitriptyline, fluoxetine, sertraline  -certain medicines for fungal infections like ketoconazole, itraconazole, voriconazole  -certain medicines for migraine headache like almotriptan, eletriptan, frovatriptan, naratriptan, rizatriptan, sumatriptan, zolmitriptan  -certain medicines for nausea or vomiting like dolasetron, ondansetron, palonosetron  -certain medicines for Parkinson's disease like benztropine, trihexyphenidyl  -certain medicines for seizures like phenobarbital, phenytoin, primidone  -certain medicines for stomach problems like dicyclomine, hyoscyamine  -certain medicines for travel  sickness like scopolamine  -diuretics  -general anesthetics like halothane, isoflurane, methoxyflurane, propofol  -ipratropium  -local anesthetics like lidocaine, pramoxine, tetracaine  -MAOIs like Carbex, Eldepryl, Marplan, Nardil, and Parnate  -medicines that relax muscles for surgery  -methylene blue  -nilotinib  -other narcotic medicines for pain or cough  -phenothiazines like chlorpromazine, mesoridazine, prochlorperazine, thioridazine  This list may not describe all possible interactions. Give your health care provider a list of all the medicines, herbs, non-prescription drugs, or dietary supplements you use. Also tell them if you smoke, drink alcohol, or use illegal drugs. Some items may interact with your medicine.  What should I watch for while using this medicine?  Tell your doctor or health care professional if your pain does not go away, if it gets worse, or if you have new or a different type of pain. You may develop tolerance to the medicine. Tolerance means that you will need a higher dose of the medicine for pain relief. Tolerance is normal and is expected if you take this medicine for a long time.  Do not suddenly stop taking your medicine because you may develop a severe reaction. Your body becomes used to the medicine. This does NOT mean you are addicted. Addiction is a behavior related to getting and using a drug for a non-medical reason. If you have pain, you have a medical reason to take pain medicine. Your doctor will tell you how much medicine to take. If your doctor wants you to stop the medicine, the dose will be slowly lowered over time to avoid any side effects.  There are different types of narcotic medicines (opiates). If you take more than one type at the same time or if you are taking another medicine that also causes drowsiness, you may have more side effects. Give your health care provider a list of all medicines you use. Your doctor will tell you how much medicine to take. Do not  take more medicine than directed. Call emergency for help if you have problems breathing or unusual sleepiness.  You may get drowsy or dizzy. Do not drive, use machinery, or do anything that needs mental alertness until you know how the medicine affects you. Do not stand or sit up quickly, especially if you are an older patient. This reduces the risk of dizzy or fainting spells. Alcohol may interfere with the effect of this medicine. Avoid alcoholic drinks.  This medicine will cause constipation. Try to have a bowel movement at least every 2 to 3 days. If you do not have a bowel movement for 3 days, call your doctor or health care professional.  Your mouth may get dry. Chewing sugarless gum or sucking hard candy, and drinking plenty of water may help. Contact your doctor if the problem does not go away or is severe.  What side effects may I notice from receiving this medicine?  Side effects that you should report to your doctor or health care professional as soon as possible:  -allergic reactions like skin rash, itching or hives, swelling of the face, lips, or tongue  -breathing problems  -confusion  -signs and symptoms of low blood pressure like dizziness; feeling faint or lightheaded, falls; unusually weak or tired  -trouble passing urine or change in the amount of urine  -trouble swallowing  Side effects that usually do not require medical attention (report to your doctor or health care professional if they continue or are bothersome):  -constipation  -dry mouth  -nausea, vomiting  -tiredness  This list may not describe all possible side effects. Call your doctor for medical advice about side effects. You may report side effects to FDA at 5-545-FDA-0957.  Where should I keep my medicine?  Keep out of the reach of children. This medicine can be abused. Keep your medicine in a safe place to protect it from theft. Do not share this medicine with anyone. Selling or giving away this medicine is dangerous and against  the law.  Store at room temperature between 15 and 30 degrees C (59 and 86 degrees F). Protect from light. Keep container tightly closed.  This medicine may cause accidental overdose and death if it is taken by other adults, children, or pets. Flush any unused medicine down the toilet to reduce the chance of harm. Do not use the medicine after the expiration date.  NOTE: This sheet is a summary. It may not cover all possible information. If you have questions about this medicine, talk to your doctor, pharmacist, or health care provider.  © 2018 [employer] (2016-11-01 16:55:57)  Nystatin oral suspension  What is this medicine?  NYSTATIN (linda STAT in) is an antifungal medicine. It is used to treat certain kinds of fungal or yeast infections.  This medicine may be used for other purposes; ask your health care provider or pharmacist if you have questions.  COMMON BRAND NAME(S): Mycostatin, Nystex  What should I tell my health care provider before I take this medicine?  They need to know if you have any of these conditions:  -diabetes  -kidney disease  -an unusual or allergic reaction to nystatin, ethylenediamine, parabens, thimerosal, other foods, dyes or preservatives  -pregnant or trying to get pregnant  -breast-feeding  How should I use this medicine?  Follow the directions on the prescription label. Shake well before using. Use a specially marked dropper to measure every dose. Ask your pharmacist if you do not have one. Put one half of the dose in each side of your mouth. Swish the medicine around in your mouth and gargle. Hold your dose in your mouth for as long as you can. Swallow or spit out as directed by your doctor. Take your medicine at regular intervals. Do not take your medicine more often than directed. Do not skip doses or stop your medicine early even if you feel better. Do not stop taking except on your doctor's advice.  Talk to your pediatrician regarding the use of this medicine in  children. Special care may be needed.  Overdosage: If you think you have taken too much of this medicine contact a poison control center or emergency room at once.  NOTE: This medicine is only for you. Do not share this medicine with others.  What if I miss a dose?  If you miss a dose, take it as soon as you can. If it is almost time for your next dose, take only that dose. Do not take double or extra doses.  What may interact with this medicine?  Interactions are not expected.  This list may not describe all possible interactions. Give your health care provider a list of all the medicines, herbs, non-prescription drugs, or dietary supplements you use. Also tell them if you smoke, drink alcohol, or use illegal drugs. Some items may interact with your medicine.  What should I watch for while using this medicine?  Tell your doctor or health care professional if your symptoms do not improve or get worse. If you wear dentures talk to your doctor about how to clean them.  What side effects may I notice from receiving this medicine?  Side effects that you should report to your doctor or health care professional as soon as possible:  -allergic reactions like skin rash, itching or hives, swelling of the face, lips, or tongue  -fast heart beat  -redness, blistering, peeling or loosening of the skin, including inside the mouth  -trouble breathing  Side effects that usually do not require medical attention (report to your doctor or health care professional if they continue or are bothersome):  -diarrhea  -muscle aches or pains  -nausea, vomiting  -stomach upset  This list may not describe all possible side effects. Call your doctor for medical advice about side effects. You may report side effects to FDA at 4-464-FDA-9441.  Where should I keep my medicine?  Keep out of the reach of children.  Store at room temperature between 15 and 25 degrees C (59 and 77 degrees F). Protect from light. Throw away any unused medicine after  the expiration date.  NOTE: This sheet is a summary. It may not cover all possible information. If you have questions about this medicine, talk to your doctor, pharmacist, or health care provider.  © 2018 [employer] Standard (2010-01-19 13:21:00)  Gabapentin capsules or tablets  What is this medicine?  GABAPENTIN (GA ba pen tin) is used to control partial seizures in adults with epilepsy. It is also used to treat certain types of nerve pain.  This medicine may be used for other purposes; ask your health care provider or pharmacist if you have questions.  COMMON BRAND NAME(S): Active-PAC with Gabapentin, Gabarone, Neurontin  What should I tell my health care provider before I take this medicine?  They need to know if you have any of these conditions:  -kidney disease  -suicidal thoughts, plans, or attempt; a previous suicide attempt by you or a family member  -an unusual or allergic reaction to gabapentin, other medicines, foods, dyes, or preservatives  -pregnant or trying to get pregnant  -breast-feeding  How should I use this medicine?  Take this medicine by mouth with a glass of water. Follow the directions on the prescription label. You can take it with or without food. If it upsets your stomach, take it with food.Take your medicine at regular intervals. Do not take it more often than directed. Do not stop taking except on your doctor's advice.  If you are directed to break the 600 or 800 mg tablets in half as part of your dose, the extra half tablet should be used for the next dose. If you have not used the extra half tablet within 28 days, it should be thrown away.  A special MedGuide will be given to you by the pharmacist with each prescription and refill. Be sure to read this information carefully each time.  Talk to your pediatrician regarding the use of this medicine in children. Special care may be needed.  Overdosage: If you think you have taken too much of this medicine contact a poison control center  or emergency room at once.  NOTE: This medicine is only for you. Do not share this medicine with others.  What if I miss a dose?  If you miss a dose, take it as soon as you can. If it is almost time for your next dose, take only that dose. Do not take double or extra doses.  What may interact with this medicine?  Do not take this medicine with any of the following medications:  -other gabapentin products  This medicine may also interact with the following medications:  -alcohol  -antacids  -antihistamines for allergy, cough and cold  -certain medicines for anxiety or sleep  -certain medicines for depression or psychotic disturbances  -homatropine; hydrocodone  -naproxen  -narcotic medicines (opiates) for pain  -phenothiazines like chlorpromazine, mesoridazine, prochlorperazine, thioridazine  This list may not describe all possible interactions. Give your health care provider a list of all the medicines, herbs, non-prescription drugs, or dietary supplements you use. Also tell them if you smoke, drink alcohol, or use illegal drugs. Some items may interact with your medicine.  What should I watch for while using this medicine?  Visit your doctor or health care professional for regular checks on your progress. You may want to keep a record at home of how you feel your condition is responding to treatment. You may want to share this information with your doctor or health care professional at each visit. You should contact your doctor or health care professional if your seizures get worse or if you have any new types of seizures. Do not stop taking this medicine or any of your seizure medicines unless instructed by your doctor or health care professional. Stopping your medicine suddenly can increase your seizures or their severity.  Wear a medical identification bracelet or chain if you are taking this medicine for seizures, and carry a card that lists all your medications.  You may get drowsy, dizzy, or have blurred  vision. Do not drive, use machinery, or do anything that needs mental alertness until you know how this medicine affects you. To reduce dizzy or fainting spells, do not sit or stand up quickly, especially if you are an older patient. Alcohol can increase drowsiness and dizziness. Avoid alcoholic drinks.  Your mouth may get dry. Chewing sugarless gum or sucking hard candy, and drinking plenty of water will help.  The use of this medicine may increase the chance of suicidal thoughts or actions. Pay special attention to how you are responding while on this medicine. Any worsening of mood, or thoughts of suicide or dying should be reported to your health care professional right away.  Women who become pregnant while using this medicine may enroll in the [place] Antiepileptic Drug Pregnancy Registry by calling 1-280.606.6397. This registry collects information about the safety of antiepileptic drug use during pregnancy.  What side effects may I notice from receiving this medicine?  Side effects that you should report to your doctor or health care professional as soon as possible:  -allergic reactions like skin rash, itching or hives, swelling of the face, lips, or tongue  -worsening of mood, thoughts or actions of suicide or dying  Side effects that usually do not require medical attention (report to your doctor or health care professional if they continue or are bothersome):  -constipation  -difficulty walking or controlling muscle movements  -dizziness  -nausea  -slurred speech  -tiredness  -tremors  -weight gain  This list may not describe all possible side effects. Call your doctor for medical advice about side effects. You may report side effects to FDA at 5-848-FDA-5412.  Where should I keep my medicine?  Keep out of reach of children.  This medicine may cause accidental overdose and death if it taken by other adults, children, or pets. Mix any unused medicine with a substance like cat litter or coffee  grounds. Then throw the medicine away in a sealed container like a sealed bag or a coffee can with a lid. Do not use the medicine after the expiration date.  Store at room temperature between 15 and 30 degrees C (59 and 86 degrees F).  NOTE: This sheet is a summary. It may not cover all possible information. If you have questions about this medicine, talk to your doctor, pharmacist, or health care provider.  © 2018 [employer] Standard (2015-02-13 15:26:50)  Cephalexin tablets or capsules  What is this medicine?  CEPHALEXIN (sef a BRAULIO in) is a cephalosporin antibiotic. It is used to treat certain kinds of bacterial infections It will not work for colds, flu, or other viral infections.  This medicine may be used for other purposes; ask your health care provider or pharmacist if you have questions.  COMMON BRAND NAME(S): Biocef, Daxbia, Keflex, Keftab  What should I tell my health care provider before I take this medicine?  They need to know if you have any of these conditions:  -kidney disease  -stomach or intestine problems, especially colitis  -an unusual or allergic reaction to cephalexin, other cephalosporins, penicillins, other antibiotics, medicines, foods, dyes or preservatives  -pregnant or trying to get pregnant  -breast-feeding  How should I use this medicine?  Take this medicine by mouth with a full glass of water. Follow the directions on the prescription label. This medicine can be taken with or without food. Take your medicine at regular intervals. Do not take your medicine more often than directed. Take all of your medicine as directed even if you think you are better. Do not skip doses or stop your medicine early.  Talk to your pediatrician regarding the use of this medicine in children. While this drug may be prescribed for selected conditions, precautions do apply.  Overdosage: If you think you have taken too much of this medicine contact a poison control center or emergency room at once.  NOTE:  This medicine is only for you. Do not share this medicine with others.  What if I miss a dose?  If you miss a dose, take it as soon as you can. If it is almost time for your next dose, take only that dose. Do not take double or extra doses. There should be at least 4 to 6 hours between doses.  What may interact with this medicine?  -probenecid  -some other antibiotics  This list may not describe all possible interactions. Give your health care provider a list of all the medicines, herbs, non-prescription drugs, or dietary supplements you use. Also tell them if you smoke, drink alcohol, or use illegal drugs. Some items may interact with your medicine.  What should I watch for while using this medicine?  Tell your doctor or health care professional if your symptoms do not begin to improve in a few days.  Do not treat diarrhea with over the counter products. Contact your doctor if you have diarrhea that lasts more than 2 days or if it is severe and watery.  If you have diabetes, you may get a false-positive result for sugar in your urine. Check with your doctor or health care professional.  What side effects may I notice from receiving this medicine?  Side effects that you should report to your doctor or health care professional as soon as possible:  -allergic reactions like skin rash, itching or hives, swelling of the face, lips, or tongue  -breathing problems  -pain or trouble passing urine  -redness, blistering, peeling or loosening of the skin, including inside the mouth  -severe or watery diarrhea  -unusually weak or tired  -yellowing of the eyes, skin  Side effects that usually do not require medical attention (report to your doctor or health care professional if they continue or are bothersome):  -gas or heartburn  -genital or anal irritation  -headache  -joint or muscle pain  -nausea, vomiting  This list may not describe all possible side effects. Call your doctor for medical advice about side effects. You may  report side effects to FDA at 1-800-FDA-1088.  Where should I keep my medicine?  Keep out of the reach of children.  Store at room temperature between 59 and 86 degrees F (15 and 30 degrees C). Throw away any unused medicine after the expiration date.  NOTE: This sheet is a summary. It may not cover all possible information. If you have questions about this medicine, talk to your doctor, pharmacist, or health care provider.  © 2018 [employer] Standard (2009-03-23 17:09:13)    Colon Mass, Adult  Introduction  A colon mass is a growth in your colon. The colon is your large intestine.  What are the causes?  Many things can cause a colon mass, including:  · Cancer.  · Blood vessel problems.  · Infection.  · Inflammatory bowel disease (IBS).  · Diverticulitis. This is inflammation or infection of small pouches in your colon.  · Endometriosis. This is a condition in which the tissue that lines the uterus grows outside of its normal location.  · Volvulus. This is a condition in which the colon twists on the organ or tissue that supports the colon (mesentery).  What are the signs or symptoms?  Symptoms of a colon mass include:  · Cramping.  · Nausea.  · Diarrhea.  · Fever.  · Vomiting.  · Feeling weak.  · Pain in the abdomen, side, or back.  · Weight loss.  · Constipation.  · Bleeding from your rectum.  · Changes in bowel habits.  · Feeling the need for bowel a movement despite having had one recently.  In some cases, no symptoms are present.  How is this diagnosed?  To make a diagnosis, your health care provider will need to learn more about the mass. You may have tests or procedures done, such as:  · Blood tests.  · X-rays.  · An ultrasound.  · A CT scan.  · An MRI.  · A colonoscopy.  · A biopsy of the mass.  In some cases, what seemed like a colon mass may actually be something else, such as scarring that formed after a surgery or an ulcer.  How is this treated?  Treatment will depend on the cause of the mass. Your  health care provider will discuss your test results with you, the meaning of the tests, and the recommended steps for starting treatment. In serious cases, emergency surgery may be recommended immediately.  Follow these instructions at home:  What you need to do at home will depend on the cause of the mass. Follow the instructions that your health care provider gives to you. In general:  · Keep all follow-up visits as directed by your health care provider. This is important.  · Take medicines only as directed by your health care provider.  Contact a health care provider if:  · You develop new symptoms.  · You have a fever.  · Your pain does not get better with medicine.  · You feel weaker.  · You develop easy bruising or bleeding.  Get help right away if:  · You vomit bright red blood or material that looks like coffee grounds.  · You have blood in your stools, or your stools turn black and tarry.  · You faint.  · You feel that the mass has suddenly gotten larger.  · You develop severe bloating in your abdomen.  This information is not intended to replace advice given to you by your health care provider. Make sure you discuss any questions you have with your health care provider.  Document Released: 03/26/2008 Document Revised: 05/25/2017 Document Reviewed: 07/19/2015  © 2017 Elsevier      · Is patient discharged on Warfarin / Coumadin?   No     Depression / Suicide Risk    As you are discharged from this [facility] facility, it is important to learn how to keep safe from harming yourself.    Recognize the warning signs:  · Abrupt changes in personality, positive or negative- including increase in energy   · Giving away possessions  · Change in eating patterns- significant weight changes-  positive or negative  · Change in sleeping patterns- unable to sleep or sleeping all the time   · Unwillingness or inability to communicate  · Depression  · Unusual sadness, discouragement and loneliness  · Talk of wanting to  die  · Neglect of personal appearance   · Rebelliousness- reckless behavior  · Withdrawal from people/activities they love  · Confusion- inability to concentrate     If you or a loved one observes any of these behaviors or has concerns about self-harm, here's what you can do:  · Talk about it- your feelings and reasons for harming yourself  · Remove any means that you might use to hurt yourself (examples: pills, rope, extension cords, firearm)  · Get professional help from the community (Mental Health, Substance Abuse, psychological counseling)  · Do not be alone:Call your Safe Contact- someone whom you trust who will be there for you.  · Call your local CRISIS HOTLINE 567-4182 or 183-900-8449  · Call your local Children's Mobile Crisis Response Team [place] (160) 427-1386 or www.[employer]  · Call the toll free National Suicide Prevention Hotlines   · National Suicide Prevention Lifeline 325-000-IWEU (9903)  · National Hope Line Network 800-SUICIDE (152-4754)

## 2020-02-24 NOTE — CARE PLAN
Problem: Hyperinflation:  Goal: Prevent or improve atelectasis  Intervention: Instruct incentive spirometry usage  Note: IS QID

## 2020-02-24 NOTE — DISCHARGE PLANNING
Anticipated Discharge Disposition:  [facility] today @ 1600    Action: Tasha will be picking pt up today at 1600.  Pt requested this time as she is waiting for her  to return from his own Dr. Moreno., he does not have a mobile phone to contact.    Pt signed COBRA, transfer packet left in pt file.  Attending MD and bedside RN notified of transfer.    Barriers to Discharge: N/A    Plan: As above, pt to transport to [place].

## 2020-02-24 NOTE — CARE PLAN
Problem: Safety  Goal: Will remain free from injury  Outcome: PROGRESSING AS EXPECTED  Note: Patient educated about risk of falls and reasoning for use of tread socks, calling for help, and bed alarms.        Problem: Infection  Goal: Will remain free from infection  Outcome: PROGRESSING AS EXPECTED  Note: Monitoring v/s and labs for s/s of infection, proper hand hygiene demonstrated to pt and spouse, abx administered per mar

## 2020-02-24 NOTE — DISCHARGE SUMMARY
Discharge Summary    CHIEF COMPLAINT ON ADMISSION  No chief complaint on file.      Reason for Admission  GI Bleed     CODE STATUS  Full Code    HPI & HOSPITAL COURSE  Ms. Montanez is an 80 y/o female who initially presented to [facility] for staple removal from her head from a fall 1 week prior, when she was noted to look pale. They checked labs at that time and her hgb was severely low at 5.2. Stool guaiac was positive. They ordered 2 units of PRBC and requested transfer at that time since they do not have GI services available there. She follows with [facility] outpatient, who was contacted upon her transfer to [facility] and on 2/14/2020 the patient underwent EGD which was normal. Then on 2/16/2020 she had a colonoscopy done which showed a right colon mass. A biopsy was taken and a cold snare resection of a polyp from the sigmoid colon was done. A surgical consultation was obtained at that time and on 2/18/2020 a robotic assisted laparoscopic right hemicolectomy was done by Dr. Pat and Dr. Mitchell. Postoperatively she has done well and has remained on the telemetry floor due to her fluid overload, tenuous respiratory status, and for close monitoring.  Pathology results from both the colonoscopy and hemicolectomy biopsies showed benign tubular adenomas. There was no high-grade dysplasia or malignancy seen.  She was informed of these results on 2/21/2020.  Also on the day she received IV iron for iron deficiency anemia.  During her hospitalization she is also been treated for fluid overload, which she has tolerated well. SNF was recommended for discharge. Referral was placed and patient was discharged to [facility] when bed was available.          Therefore, she is discharged in guarded and stable condition to skilled nursing facility.    The patient met 2-midnight criteria for an inpatient stay at the time of discharge.    Please see below for full assessment and plan.  Assessment/Plan  Anemia requiring transfusions-  (present on admission)  Assessment & Plan  -Multifactorial, acute blood loss + iron deficiency.   -S/p 2 units PRBCs 2/17/2020, s/p IV iron 2/21/2020.   -Hgb stable at 7.5 today     Colonic mass- (present on admission)  Assessment & Plan  -S/p right hemicolectomy on 2/18/2020. Pathology showed benign adenoma.   -Advance diet per surgery. Tolerating GI soft diet.   -Surgical incisions uncomplicated.      Acute pulmonary edema (HCC)  Assessment & Plan  -Stable today. On room air at time of exam.   -Repeat cxr done 2/21/2020 showed a stable small/mod left and small right pleural effusions. Repeat xray tomorrow to monitor progress.   -Echo largely WNL. EF normal.   -BP's stabilized, continue furosemide.      Pulmonary emphysema (HCC)- (present on admission)  Assessment & Plan  -Continue bronchodilators prn.   -Continue to encourage mobility and incentive spirometry.       HTN (hypertension)- (present on admission)  Assessment & Plan  -Home lisinopril and lopressor held for allow BP room for effective diuresis. 90s-120s overnight. Continue to hold as patient also with low Bps.   -Continue to trend per unit protocol.      PAD (peripheral artery disease) (HCC)- (present on admission)  Assessment & Plan  -S/p angioplasty in December with Dr. Mohsin of vascular surgery.  Has been taking aspirin and plavix at home, which are currently being held in light of her GI bleed & anemia.  -Continue home atorvastatin.     FOLLOW UP ITEMS POST DISCHARGE  Follow-up with GI outpatient   Follow-up with PCP    DISCHARGE DIAGNOSES  Principal Problem (Resolved):    GIB (gastrointestinal bleeding) POA: Yes  Active Problems:    Acute pulmonary edema (HCC) POA: No    Colonic mass POA: Yes    Iron deficiency anemia POA: Yes    PAD (peripheral artery disease) (HCC) POA: Yes    HTN (hypertension) POA: Yes    Pulmonary emphysema (HCC) POA: Yes  Resolved Problems:    Anemia requiring transfusions POA: Yes    Hypokalemia POA: No      FOLLOW UP  No  future appointments.  No follow-up provider specified.    MEDICATIONS ON DISCHARGE     Medication List      START taking these medications      Instructions   cephALEXin 500 MG Caps  Commonly known as:  KEFLEX   Take 1 Cap by mouth every 6 hours for 3 days.  Dose:  500 mg     gabapentin 100 MG Caps  Commonly known as:  NEURONTIN   Take 1 Cap by mouth 3 times a day.  Dose:  100 mg     nystatin 014004 UNIT/ML Susp  Commonly known as:  MYCOSTATIN   Take 5 mL by mouth 4 times a day for 4 days.  Dose:  5 mL     oxyCODONE immediate-release 5 MG Tabs  Commonly known as:  ROXICODONE   Take 1 Tab by mouth every 8 hours as needed for Severe Pain for up to 7 days.  Dose:  5 mg        CHANGE how you take these medications      Instructions   atorvastatin 80 MG tablet  Start taking on:  February 25, 2020  What changed:    · medication strength  · how much to take  Commonly known as:  LIPITOR   Take 1 Tab by mouth every day.  Dose:  80 mg     furosemide 20 MG Tabs  What changed:    · medication strength  · when to take this  · additional instructions  Commonly known as:  LASIX   Take 1 Tab by mouth 2 Times a Day.  Dose:  20 mg        CONTINUE taking these medications      Instructions   busPIRone 5 MG tablet  Commonly known as:  BUSPAR   Take 5 mg by mouth 3 times a day.  Dose:  5 mg     omeprazole 20 MG delayed-release capsule  Commonly known as:  PRILOSEC   Take 20 mg by mouth 2 times a day.  Dose:  20 mg        STOP taking these medications    aspirin 81 MG Chew chewable tablet  Commonly known as:  ASA     clopidogrel 75 MG Tabs  Commonly known as:  PLAVIX     DPH-Lido-AlHydr-MgHydr-Simeth Susp     lisinopril 5 MG Tabs  Commonly known as:  PRINIVIL     metoprolol SR 25 MG Tb24  Commonly known as:  TOPROL XL     potassium citrate 5 MEQ (540 MG) Tbcr  Commonly known as:  UROCIT-K     tramadol 50 MG Tabs  Commonly known as:  ULTRAM            Allergies  Allergies   Allergen Reactions   • Sulfa Drugs    • Tetracycline         DIET  Orders Placed This Encounter   Procedures   • Diet Order Low Fiber(GI Soft)     Begin Full Liquid diet as soon as alert on floor.     Standing Status:   Standing     Number of Occurrences:   1     Order Specific Question:   Diet:     Answer:   Low Fiber(GI Soft) [2]       ACTIVITY  As tolerated and directed by skilled nursing.  Weight bearing as tolerated    LINES, DRAINS, AND WOUNDS  This is an automated list. Peripheral IVs will be removed prior to discharge.  Peripheral IV 02/14/20 22 G Anterior;Right Forearm (Active)   Site Assessment Clean;Dry;Intact 2/24/2020  7:00 AM   Dressing Type Transparent 2/24/2020  7:00 AM   Line Status Flushed;Scrubbed the hub prior to access;Saline locked 2/24/2020  7:00 AM   Dressing Status Clean;Dry;Intact 2/24/2020  7:00 AM   Dressing Intervention N/A 2/24/2020  7:00 AM   Date Primary Tubing Changed 02/22/20 2/22/2020  9:00 PM   NEXT Primary Tubing Change  02/21/20 2/18/2020  9:00 PM   Infiltration Grading ([facility], [facility]) 0 2/24/2020  7:00 AM   Phlebitis Scale ([facility] Only) 0 2/24/2020  7:00 AM       Wound 02/13/20 Skin Tear Foot;Toe, Hallux Left;Anterior Left (Active)   Wound Image   2/13/2020 11:17 PM   Site Assessment Clean;Dry 2/23/2020  7:00 PM   Periwound Assessment Clean;Dry;Intact;Normal Temperature 2/23/2020  7:00 PM   Margins Defined edges 2/19/2020  7:35 AM   Closure Adhesive bandage 2/21/2020  8:21 PM   Drainage Amount None 2/23/2020  7:00 PM   Drainage Description Clear 2/19/2020  7:35 AM   Treatments Site care 2/13/2020 11:17 PM   Periwound Protectant Not Applicable 2/19/2020  7:35 AM   Dressing Options Bandaid 2/19/2020  7:35 AM   Dressing Changed Observed 2/19/2020  8:00 PM   Dressing Status Open to Air 2/23/2020  7:00 PM   Dressing Change/Treatment Frequency As Needed 2/19/2020  8:00 PM       Wound 02/18/20 Abdomen (Active)   Site Assessment Clean;Dry 2/23/2020  7:00 PM   Periwound Assessment Clean;Dry;Intact;Normal Temperature 2/23/2020  7:00 PM    Margins VANE 2/20/2020  9:00 PM   Closure Staples;Approximated 2/23/2020  7:00 PM   Drainage Amount None 2/23/2020  7:00 PM   Treatments Ice applied 2/18/2020  5:00 PM   Wound Cleansing Not Applicable 2/23/2020  7:00 PM   Dressing Options Open to Air 2/23/2020  7:00 PM   Dressing Changed Observed 2/21/2020  8:21 PM   Dressing Status Clean;Intact;Dry 2/21/2020  8:21 PM       Peripheral IV 02/14/20 22 G Anterior;Right Forearm (Active)   Site Assessment Clean;Dry;Intact 2/24/2020  7:00 AM   Dressing Type Transparent 2/24/2020  7:00 AM   Line Status Flushed;Scrubbed the hub prior to access;Saline locked 2/24/2020  7:00 AM   Dressing Status Clean;Dry;Intact 2/24/2020  7:00 AM   Dressing Intervention N/A 2/24/2020  7:00 AM   Date Primary Tubing Changed 02/22/20 2/22/2020  9:00 PM   NEXT Primary Tubing Change  02/21/20 2/18/2020  9:00 PM   Infiltration Grading ([facility], [facility]) 0 2/24/2020  7:00 AM   Phlebitis Scale ([facility] Only) 0 2/24/2020  7:00 AM               MENTAL STATUS ON TRANSFER  Level of Consciousness: Alert  Orientation : Oriented x 4  Speech: Speech Clear    CONSULTATIONS  General surgery   Gastroenterology     PROCEDURES  2/14: EGD   2/16: Colonoscopy with cold snare resection of polyp from the sigmoid colon.  2/18: Robotic-assisted laparoscopic right hemicolectomy.    LABORATORY  Lab Results   Component Value Date    SODIUM 137 02/23/2020    POTASSIUM 3.8 02/24/2020    CHLORIDE 101 02/23/2020    CO2 28 02/23/2020    GLUCOSE 107 (H) 02/23/2020    BUN 9 02/23/2020    CREATININE 0.41 (L) 02/23/2020        Lab Results   Component Value Date    WBC 9.9 02/24/2020    HEMOGLOBIN 7.5 (L) 02/24/2020    HEMATOCRIT 24.7 (L) 02/24/2020    PLATELETCT 247 02/24/2020        Total time of the discharge process exceeds 40 minutes.

## 2020-02-24 NOTE — CARE PLAN
Problem: Safety  Goal: Will remain free from falls  Outcome: PROGRESSING AS EXPECTED  Intervention: Implement fall precautions  Flowsheets (Taken 2/23/2020 4380)  Environmental Precautions:   Treaded Slipper Socks on Patient   Personal Belongings, Wastebasket, Call Bell etc. in Easy Reach   Bed in Low Position   Communication Sign for Patients & Families   Mobility Assessed & Appropriate Sign Placed     Problem: Knowledge Deficit  Goal: Knowledge of disease process/condition, treatment plan, diagnostic tests, and medications will improve  Outcome: PROGRESSING AS EXPECTED  Intervention: Explain information regarding disease process/condition, treatment plan, diagnostic tests, and medications and document in education  Note: Pt educated regarding plan of care and medications. All questions answered.

## 2020-02-25 NOTE — PROGRESS NOTES
Discharge Summary  Educated patient on new medications. Patient is going to [facility]. PIV removed with no s/s of bleeding or infection at site. Vitals WNL. Patient transferred via wheelchair.

## 2021-01-14 DIAGNOSIS — Z23 NEED FOR VACCINATION: ICD-10-CM

## 2021-10-26 NOTE — PATIENT INSTRUCTIONS
Edema  Edema is an abnormal buildup of fluids in your body tissues. Edema is somewhat dependent on gravity to pull the fluid to the lowest place in your body. That makes the condition more common in the legs and thighs (lower extremities). Painless swelling of the feet and ankles is common and becomes more likely as you get older. It is also common in looser tissues, like around your eyes.  When the affected area is squeezed, the fluid may move out of that spot and leave a dent for a few moments. This dent is called pitting.  What are the causes?  There are many possible causes of edema. Eating too much salt and being on your feet or sitting for a long time can cause edema in your legs and ankles. Hot weather may make edema worse. Common medical causes of edema include:  · Heart failure.  · Liver disease.  · Kidney disease.  · Weak blood vessels in your legs.  · Cancer.  · An injury.  · Pregnancy.  · Some medications.  · Obesity.  What are the signs or symptoms?  Edema is usually painless. Your skin may look swollen or shiny.  How is this diagnosed?  Your health care provider may be able to diagnose edema by asking about your medical history and doing a physical exam. You may need to have tests such as X-rays, an electrocardiogram, or blood tests to check for medical conditions that may cause edema.  How is this treated?  Edema treatment depends on the cause. If you have heart, liver, or kidney disease, you need the treatment appropriate for these conditions. General treatment may include:  · Elevation of the affected body part above the level of your heart.  · Compression of the affected body part. Pressure from elastic bandages or support stockings squeezes the tissues and forces fluid back into the blood vessels. This keeps fluid from entering the tissues.  · Restriction of fluid and salt intake.  · Use of a water pill (diuretic). These medications are appropriate only for some types of edema. They pull fluid out  of your body and make you urinate more often. This gets rid of fluid and reduces swelling, but diuretics can have side effects. Only use diuretics as directed by your health care provider.  Follow these instructions at home:  · Keep the affected body part above the level of your heart when you are lying down.  · Do not sit still or stand for prolonged periods.  · Do not put anything directly under your knees when lying down.  · Do not wear constricting clothing or garters on your upper legs.  · Exercise your legs to work the fluid back into your blood vessels. This may help the swelling go down.  · Wear elastic bandages or support stockings to reduce ankle swelling as directed by your health care provider.  · Eat a low-salt diet to reduce fluid if your health care provider recommends it.  · Only take medicines as directed by your health care provider.  Contact a health care provider if:  · Your edema is not responding to treatment.  · You have heart, liver, or kidney disease and notice symptoms of edema.  · You have edema in your legs that does not improve after elevating them.  · You have sudden and unexplained weight gain.  Get help right away if:  · You develop shortness of breath or chest pain.  · You cannot breathe when you lie down.  · You develop pain, redness, or warmth in the swollen areas.  · You have heart, liver, or kidney disease and suddenly get edema.  · You have a fever and your symptoms suddenly get worse.  This information is not intended to replace advice given to you by your health care provider. Make sure you discuss any questions you have with your health care provider.  Document Released: 12/18/2006 Document Revised: 05/25/2017 Document Reviewed: 10/10/2014  [employer] Patient Education © 2017 [employer]     Area H Indication Text: Tumors in this location are included in Area H (eyelids, eyebrows, nose, lips, chin, ear, pre-auricular, post-auricular, temple, genitalia, hands, feet, ankles and areola).  Tissue conservation is critical in these anatomic locations.

## 2024-01-01 ENCOUNTER — HOSPITAL ENCOUNTER (OUTPATIENT)
Dept: RADIOLOGY | Facility: MEDICAL CENTER | Age: 84
End: 2024-05-16
Attending: STUDENT IN AN ORGANIZED HEALTH CARE EDUCATION/TRAINING PROGRAM
Payer: MEDICARE

## 2024-01-01 ENCOUNTER — APPOINTMENT (OUTPATIENT)
Dept: RADIOLOGY | Facility: MEDICAL CENTER | Age: 84
DRG: 871 | End: 2024-01-01
Attending: INTERNAL MEDICINE
Payer: MEDICARE

## 2024-01-01 ENCOUNTER — HOSPITAL ENCOUNTER (INPATIENT)
Facility: MEDICAL CENTER | Age: 84
LOS: 1 days | DRG: 871 | End: 2024-05-16
Attending: EMERGENCY MEDICINE | Admitting: INTERNAL MEDICINE
Payer: MEDICARE

## 2024-01-01 ENCOUNTER — APPOINTMENT (OUTPATIENT)
Dept: RADIOLOGY | Facility: MEDICAL CENTER | Age: 84
DRG: 871 | End: 2024-01-01
Attending: EMERGENCY MEDICINE
Payer: MEDICARE

## 2024-01-01 VITALS
DIASTOLIC BLOOD PRESSURE: 25 MMHG | HEART RATE: 86 BPM | BODY MASS INDEX: 16.88 KG/M2 | SYSTOLIC BLOOD PRESSURE: 40 MMHG | WEIGHT: 85.98 LBS | OXYGEN SATURATION: 68 % | RESPIRATION RATE: 12 BRPM | TEMPERATURE: 97.7 F | HEIGHT: 60 IN

## 2024-01-01 DIAGNOSIS — E87.20 LACTIC ACIDOSIS: ICD-10-CM

## 2024-01-01 DIAGNOSIS — N17.9 ACUTE RENAL FAILURE, UNSPECIFIED ACUTE RENAL FAILURE TYPE (HCC): ICD-10-CM

## 2024-01-01 DIAGNOSIS — R57.1 HYPOVOLEMIC SHOCK (HCC): ICD-10-CM

## 2024-01-01 DIAGNOSIS — E87.5 HYPERKALEMIA: ICD-10-CM

## 2024-01-01 DIAGNOSIS — J96.00 ACUTE RESPIRATORY FAILURE, UNSPECIFIED WHETHER WITH HYPOXIA OR HYPERCAPNIA (HCC): ICD-10-CM

## 2024-01-01 LAB
ABO GROUP BLD: NORMAL
ALBUMIN SERPL BCP-MCNC: 2.6 G/DL (ref 3.2–4.9)
ALBUMIN SERPL BCP-MCNC: 2.6 G/DL (ref 3.2–4.9)
ALBUMIN SERPL BCP-MCNC: 2.8 G/DL (ref 3.2–4.9)
ALBUMIN SERPL BCP-MCNC: 2.8 G/DL (ref 3.2–4.9)
ALBUMIN/GLOB SERPL: 1.2 G/DL
ALBUMIN/GLOB SERPL: 1.4 G/DL
ALP SERPL-CCNC: 51 U/L (ref 30–99)
ALP SERPL-CCNC: 53 U/L (ref 30–99)
ALP SERPL-CCNC: 54 U/L (ref 30–99)
ALP SERPL-CCNC: 54 U/L (ref 30–99)
ALT SERPL-CCNC: 309 U/L (ref 2–50)
ALT SERPL-CCNC: 327 U/L (ref 2–50)
ALT SERPL-CCNC: 329 U/L (ref 2–50)
ALT SERPL-CCNC: 99 U/L (ref 2–50)
AMMONIA PLAS-SCNC: 60 UMOL/L (ref 11–45)
ANION GAP SERPL CALC-SCNC: 20 MMOL/L (ref 7–16)
ANION GAP SERPL CALC-SCNC: 21 MMOL/L (ref 7–16)
ANION GAP SERPL CALC-SCNC: 23 MMOL/L (ref 7–16)
ANION GAP SERPL CALC-SCNC: 27 MMOL/L (ref 7–16)
ANISOCYTOSIS BLD QL SMEAR: ABNORMAL
APPEARANCE UR: ABNORMAL
APTT PPP: 27.8 SEC (ref 24.7–36)
ARTERIAL PATENCY WRIST A: ABNORMAL
AST SERPL-CCNC: 170 U/L (ref 12–45)
AST SERPL-CCNC: 682 U/L (ref 12–45)
AST SERPL-CCNC: 759 U/L (ref 12–45)
AST SERPL-CCNC: 771 U/L (ref 12–45)
B-OH-BUTYR SERPL-MCNC: 1.51 MMOL/L (ref 0.02–0.27)
BACTERIA #/AREA URNS HPF: NEGATIVE /HPF
BASE EXCESS BLDA CALC-SCNC: -23 MMOL/L (ref -4–3)
BASE EXCESS BLDA CALC-SCNC: -5 MMOL/L (ref -4–3)
BASE EXCESS BLDA CALC-SCNC: 12 MMOL/L (ref -4–3)
BASOPHILS # BLD AUTO: 0 % (ref 0–1.8)
BASOPHILS # BLD AUTO: 0.1 % (ref 0–1.8)
BASOPHILS # BLD AUTO: 0.1 % (ref 0–1.8)
BASOPHILS # BLD: 0 K/UL (ref 0–0.12)
BASOPHILS # BLD: 0.01 K/UL (ref 0–0.12)
BASOPHILS # BLD: 0.01 K/UL (ref 0–0.12)
BILIRUB SERPL-MCNC: 0.2 MG/DL (ref 0.1–1.5)
BILIRUB UR QL STRIP.AUTO: NEGATIVE
BLD GP AB SCN SERPL QL: NORMAL
BODY TEMPERATURE: ABNORMAL DEGREES
BREATHS SETTING VENT: 22
BREATHS SETTING VENT: 25
BREATHS SETTING VENT: 25
BUN SERPL-MCNC: 67 MG/DL (ref 8–22)
BUN SERPL-MCNC: 70 MG/DL (ref 8–22)
BUN SERPL-MCNC: 71 MG/DL (ref 8–22)
BUN SERPL-MCNC: 75 MG/DL (ref 8–22)
BURR CELLS BLD QL SMEAR: NORMAL
CALCIUM ALBUM COR SERPL-MCNC: 8.6 MG/DL (ref 8.5–10.5)
CALCIUM ALBUM COR SERPL-MCNC: 8.7 MG/DL (ref 8.5–10.5)
CALCIUM ALBUM COR SERPL-MCNC: 9.2 MG/DL (ref 8.5–10.5)
CALCIUM ALBUM COR SERPL-MCNC: 9.5 MG/DL (ref 8.5–10.5)
CALCIUM SERPL-MCNC: 7.6 MG/DL (ref 8.5–10.5)
CALCIUM SERPL-MCNC: 7.6 MG/DL (ref 8.5–10.5)
CALCIUM SERPL-MCNC: 8.1 MG/DL (ref 8.5–10.5)
CALCIUM SERPL-MCNC: 8.5 MG/DL (ref 8.5–10.5)
CHLORIDE SERPL-SCNC: 103 MMOL/L (ref 96–112)
CHLORIDE SERPL-SCNC: 104 MMOL/L (ref 96–112)
CHLORIDE SERPL-SCNC: 108 MMOL/L (ref 96–112)
CHLORIDE SERPL-SCNC: 111 MMOL/L (ref 96–112)
CK SERPL-CCNC: 197 U/L (ref 0–154)
CO2 BLDA-SCNC: 20 MMOL/L (ref 20–33)
CO2 BLDA-SCNC: 36 MMOL/L (ref 20–33)
CO2 BLDA-SCNC: 9 MMOL/L (ref 20–33)
CO2 SERPL-SCNC: 20 MMOL/L (ref 20–33)
CO2 SERPL-SCNC: 25 MMOL/L (ref 20–33)
CO2 SERPL-SCNC: 27 MMOL/L (ref 20–33)
CO2 SERPL-SCNC: 5 MMOL/L (ref 20–33)
COLOR UR: ABNORMAL
CORTIS SERPL-MCNC: 67.3 UG/DL (ref 0–23)
CREAT SERPL-MCNC: 3.15 MG/DL (ref 0.5–1.4)
CREAT SERPL-MCNC: 3.17 MG/DL (ref 0.5–1.4)
CREAT SERPL-MCNC: 3.28 MG/DL (ref 0.5–1.4)
CREAT SERPL-MCNC: 3.55 MG/DL (ref 0.5–1.4)
DELSYS IDSYS: ABNORMAL
EKG IMPRESSION: NORMAL
END TIDAL CARBON DIOXIDE IECO2: 23 MMHG
EOSINOPHIL # BLD AUTO: 0 K/UL (ref 0–0.51)
EOSINOPHIL NFR BLD: 0 % (ref 0–6.9)
EPI CELLS #/AREA URNS HPF: ABNORMAL /HPF
ERYTHROCYTE [DISTWIDTH] IN BLOOD BY AUTOMATED COUNT: 54.1 FL (ref 35.9–50)
ERYTHROCYTE [DISTWIDTH] IN BLOOD BY AUTOMATED COUNT: 56.9 FL (ref 35.9–50)
ERYTHROCYTE [DISTWIDTH] IN BLOOD BY AUTOMATED COUNT: 65.9 FL (ref 35.9–50)
EST. AVERAGE GLUCOSE BLD GHB EST-MCNC: 94 MG/DL
FERRITIN SERPL-MCNC: 32.9 NG/ML (ref 10–291)
GFR SERPLBLD CREATININE-BSD FMLA CKD-EPI: 12 ML/MIN/1.73 M 2
GFR SERPLBLD CREATININE-BSD FMLA CKD-EPI: 13 ML/MIN/1.73 M 2
GFR SERPLBLD CREATININE-BSD FMLA CKD-EPI: 14 ML/MIN/1.73 M 2
GFR SERPLBLD CREATININE-BSD FMLA CKD-EPI: 14 ML/MIN/1.73 M 2
GLOBULIN SER CALC-MCNC: 2 G/DL (ref 1.9–3.5)
GLOBULIN SER CALC-MCNC: 2.1 G/DL (ref 1.9–3.5)
GLOBULIN SER CALC-MCNC: 2.1 G/DL (ref 1.9–3.5)
GLOBULIN SER CALC-MCNC: 2.4 G/DL (ref 1.9–3.5)
GLUCOSE BLD STRIP.AUTO-MCNC: 129 MG/DL (ref 65–99)
GLUCOSE BLD STRIP.AUTO-MCNC: 132 MG/DL (ref 65–99)
GLUCOSE BLD STRIP.AUTO-MCNC: 145 MG/DL (ref 65–99)
GLUCOSE BLD STRIP.AUTO-MCNC: 176 MG/DL (ref 65–99)
GLUCOSE BLD STRIP.AUTO-MCNC: 312 MG/DL (ref 65–99)
GLUCOSE BLD STRIP.AUTO-MCNC: 74 MG/DL (ref 65–99)
GLUCOSE SERPL-MCNC: 161 MG/DL (ref 65–99)
GLUCOSE SERPL-MCNC: 173 MG/DL (ref 65–99)
GLUCOSE SERPL-MCNC: 222 MG/DL (ref 65–99)
GLUCOSE SERPL-MCNC: 400 MG/DL (ref 65–99)
GLUCOSE UR STRIP.AUTO-MCNC: NEGATIVE MG/DL
HBA1C MFR BLD: 4.9 % (ref 4–5.6)
HCO3 BLDA-SCNC: 19.2 MMOL/L (ref 17–25)
HCO3 BLDA-SCNC: 34.6 MMOL/L (ref 17–25)
HCO3 BLDA-SCNC: 7.6 MMOL/L (ref 17–25)
HCT VFR BLD AUTO: 24.4 % (ref 37–47)
HCT VFR BLD AUTO: 24.5 % (ref 37–47)
HCT VFR BLD AUTO: 28.7 % (ref 37–47)
HGB BLD-MCNC: 7.5 G/DL (ref 12–16)
HGB BLD-MCNC: 7.8 G/DL (ref 12–16)
HGB BLD-MCNC: 7.9 G/DL (ref 12–16)
HGB RETIC QN AUTO: 25.2 PG/CELL (ref 29–35)
HGB RETIC QN AUTO: 26.3 PG/CELL (ref 29–35)
HOROWITZ INDEX BLDA+IHG-RTO: 386 MM[HG]
HOROWITZ INDEX BLDA+IHG-RTO: 438 MM[HG]
HOROWITZ INDEX BLDA+IHG-RTO: 56 MM[HG]
HYALINE CASTS #/AREA URNS LPF: ABNORMAL /LPF
IMM GRANULOCYTES # BLD AUTO: 0.06 K/UL (ref 0–0.11)
IMM GRANULOCYTES # BLD AUTO: 0.08 K/UL (ref 0–0.11)
IMM GRANULOCYTES NFR BLD AUTO: 0.5 % (ref 0–0.9)
IMM GRANULOCYTES NFR BLD AUTO: 0.6 % (ref 0–0.9)
IMM RETICS NFR: 25.9 % (ref 2.6–16.1)
IMM RETICS NFR: 29.1 % (ref 2.6–16.1)
INR PPP: 1.27 (ref 0.87–1.13)
INR PPP: 1.33 (ref 0.87–1.13)
IRON SATN MFR SERPL: 5 % (ref 15–55)
IRON SATN MFR SERPL: 6 % (ref 15–55)
IRON SERPL-MCNC: 10 UG/DL (ref 40–170)
IRON SERPL-MCNC: 12 UG/DL (ref 40–170)
KETONES UR STRIP.AUTO-MCNC: ABNORMAL MG/DL
LACTATE BLD-SCNC: 1.7 MMOL/L (ref 0.5–2)
LACTATE BLD-SCNC: 5.3 MMOL/L (ref 0.5–2)
LACTATE SERPL-SCNC: 2.4 MMOL/L (ref 0.5–2)
LACTATE SERPL-SCNC: 2.5 MMOL/L (ref 0.5–2)
LACTATE SERPL-SCNC: 5.6 MMOL/L (ref 0.5–2)
LACTATE SERPL-SCNC: 8.3 MMOL/L (ref 0.5–2)
LEUKOCYTE ESTERASE UR QL STRIP.AUTO: ABNORMAL
LYMPHOCYTES # BLD AUTO: 0.49 K/UL (ref 1–4.8)
LYMPHOCYTES # BLD AUTO: 0.51 K/UL (ref 1–4.8)
LYMPHOCYTES # BLD AUTO: 0.6 K/UL (ref 1–4.8)
LYMPHOCYTES NFR BLD: 3.8 % (ref 22–41)
LYMPHOCYTES NFR BLD: 4.7 % (ref 22–41)
LYMPHOCYTES NFR BLD: 5.2 % (ref 22–41)
MACROCYTES BLD QL SMEAR: ABNORMAL
MAGNESIUM SERPL-MCNC: 2.8 MG/DL (ref 1.5–2.5)
MANUAL DIFF BLD: NORMAL
MCH RBC QN AUTO: 27.1 PG (ref 27–33)
MCH RBC QN AUTO: 27.1 PG (ref 27–33)
MCH RBC QN AUTO: 27.5 PG (ref 27–33)
MCHC RBC AUTO-ENTMCNC: 27.2 G/DL (ref 32.2–35.5)
MCHC RBC AUTO-ENTMCNC: 30.6 G/DL (ref 32.2–35.5)
MCHC RBC AUTO-ENTMCNC: 32.4 G/DL (ref 32.2–35.5)
MCV RBC AUTO: 85 FL (ref 81.4–97.8)
MCV RBC AUTO: 88.4 FL (ref 81.4–97.8)
MCV RBC AUTO: 99.7 FL (ref 81.4–97.8)
METAMYELOCYTES NFR BLD MANUAL: 0.9 %
MICRO URNS: ABNORMAL
MODE IMODE: ABNORMAL
MONOCYTES # BLD AUTO: 0.22 K/UL (ref 0–0.85)
MONOCYTES # BLD AUTO: 0.3 K/UL (ref 0–0.85)
MONOCYTES # BLD AUTO: 0.56 K/UL (ref 0–0.85)
MONOCYTES NFR BLD AUTO: 1.7 % (ref 0–13.4)
MONOCYTES NFR BLD AUTO: 2.2 % (ref 0–13.4)
MONOCYTES NFR BLD AUTO: 6 % (ref 0–13.4)
MORPHOLOGY BLD-IMP: NORMAL
NEUTROPHILS # BLD AUTO: 12.01 K/UL (ref 1.82–7.42)
NEUTROPHILS # BLD AUTO: 12.51 K/UL (ref 1.82–7.42)
NEUTROPHILS # BLD AUTO: 8.26 K/UL (ref 1.82–7.42)
NEUTROPHILS NFR BLD: 87.9 % (ref 44–72)
NEUTROPHILS NFR BLD: 93 % (ref 44–72)
NEUTROPHILS NFR BLD: 93.3 % (ref 44–72)
NITRITE UR QL STRIP.AUTO: NEGATIVE
NRBC # BLD AUTO: 0 K/UL
NRBC # BLD AUTO: 0.02 K/UL
NRBC # BLD AUTO: 0.02 K/UL
NRBC BLD-RTO: 0 /100 WBC (ref 0–0.2)
NRBC BLD-RTO: 0.1 /100 WBC (ref 0–0.2)
NRBC BLD-RTO: 0.2 /100 WBC (ref 0–0.2)
NT-PROBNP SERPL IA-MCNC: 1203 PG/ML (ref 0–125)
O2/TOTAL GAS SETTING VFR VENT: 100 %
O2/TOTAL GAS SETTING VFR VENT: 100 %
O2/TOTAL GAS SETTING VFR VENT: 50 %
PCO2 BLDA: 29.3 MMHG (ref 26–37)
PCO2 BLDA: 34.8 MMHG (ref 26–37)
PCO2 BLDA: 35 MMHG (ref 26–37)
PCO2 TEMP ADJ BLDA: 26.4 MMHG (ref 26–37)
PCO2 TEMP ADJ BLDA: 34.5 MMHG (ref 26–37)
PEEP END EXPIRATORY PRESSURE IPEEP: 8 CMH20
PH BLDA: 6.95 [PH] (ref 7.4–7.5)
PH BLDA: 7.42 [PH] (ref 7.4–7.5)
PH BLDA: 7.61 [PH] (ref 7.4–7.5)
PH TEMP ADJ BLDA: 7.46 [PH] (ref 7.4–7.5)
PH TEMP ADJ BLDA: 7.61 [PH] (ref 7.4–7.5)
PH UR STRIP.AUTO: 5 [PH] (ref 5–8)
PHOSPHATE SERPL-MCNC: 5.3 MG/DL (ref 2.5–4.5)
PLATELET # BLD AUTO: 221 K/UL (ref 164–446)
PLATELET # BLD AUTO: 248 K/UL (ref 164–446)
PLATELET # BLD AUTO: 278 K/UL (ref 164–446)
PLATELET BLD QL SMEAR: NORMAL
PMV BLD AUTO: 10 FL (ref 9–12.9)
PMV BLD AUTO: 10.1 FL (ref 9–12.9)
PMV BLD AUTO: 10.1 FL (ref 9–12.9)
PO2 BLDA: 193 MMHG (ref 64–87)
PO2 BLDA: 438 MMHG (ref 64–87)
PO2 BLDA: 56 MMHG (ref 64–87)
PO2 TEMP ADJ BLDA: 192 MMHG (ref 64–87)
PO2 TEMP ADJ BLDA: 424 MMHG (ref 64–87)
POIKILOCYTOSIS BLD QL SMEAR: NORMAL
POTASSIUM SERPL-SCNC: 3.7 MMOL/L (ref 3.6–5.5)
POTASSIUM SERPL-SCNC: 3.8 MMOL/L (ref 3.6–5.5)
POTASSIUM SERPL-SCNC: 4.4 MMOL/L (ref 3.6–5.5)
POTASSIUM SERPL-SCNC: 6.9 MMOL/L (ref 3.6–5.5)
PROCALCITONIN SERPL-MCNC: 0.19 NG/ML
PROT SERPL-MCNC: 4.7 G/DL (ref 6–8.2)
PROT SERPL-MCNC: 4.7 G/DL (ref 6–8.2)
PROT SERPL-MCNC: 4.8 G/DL (ref 6–8.2)
PROT SERPL-MCNC: 5.2 G/DL (ref 6–8.2)
PROT UR QL STRIP: 100 MG/DL
PROTHROMBIN TIME: 16.1 SEC (ref 12–14.6)
PROTHROMBIN TIME: 16.7 SEC (ref 12–14.6)
RBC # BLD AUTO: 2.77 M/UL (ref 4.2–5.4)
RBC # BLD AUTO: 2.87 M/UL (ref 4.2–5.4)
RBC # BLD AUTO: 2.88 M/UL (ref 4.2–5.4)
RBC # URNS HPF: ABNORMAL /HPF
RBC BLD AUTO: PRESENT
RBC UR QL AUTO: ABNORMAL
RETICS # AUTO: 0.04 M/UL (ref 0.04–0.12)
RETICS # AUTO: 0.04 M/UL (ref 0.04–0.12)
RETICS/RBC NFR: 1.3 % (ref 0.8–2.6)
RETICS/RBC NFR: 1.5 % (ref 0.8–2.6)
RH BLD: NORMAL
SALICYLATES SERPL-MCNC: <1 MG/DL (ref 15–25)
SAO2 % BLDA: 100 % (ref 93–99)
SAO2 % BLDA: 100 % (ref 93–99)
SAO2 % BLDA: 69 % (ref 93–99)
SCCMEC + MECA PNL NOSE NAA+PROBE: NEGATIVE
SODIUM SERPL-SCNC: 143 MMOL/L (ref 135–145)
SODIUM SERPL-SCNC: 150 MMOL/L (ref 135–145)
SODIUM SERPL-SCNC: 150 MMOL/L (ref 135–145)
SODIUM SERPL-SCNC: 151 MMOL/L (ref 135–145)
SP GR UR STRIP.AUTO: 1.01
SPECIMEN DRAWN FROM PATIENT: ABNORMAL
T4 FREE SERPL-MCNC: 0.87 NG/DL (ref 0.93–1.7)
TIBC SERPL-MCNC: 183 UG/DL (ref 250–450)
TIBC SERPL-MCNC: 212 UG/DL (ref 250–450)
TIDAL VOLUME IVT: 300 ML
TRIGL SERPL-MCNC: 123 MG/DL (ref 0–149)
TROPONIN T SERPL-MCNC: 95 NG/L (ref 6–19)
TSH SERPL DL<=0.005 MIU/L-ACNC: 17.4 UIU/ML (ref 0.38–5.33)
UIBC SERPL-MCNC: 173 UG/DL (ref 110–370)
UIBC SERPL-MCNC: 200 UG/DL (ref 110–370)
UROBILINOGEN UR STRIP.AUTO-MCNC: 0.2 MG/DL
VIT B12 SERPL-MCNC: 3882 PG/ML (ref 211–911)
WBC # BLD AUTO: 12.9 K/UL (ref 4.8–10.8)
WBC # BLD AUTO: 13.4 K/UL (ref 4.8–10.8)
WBC # BLD AUTO: 9.4 K/UL (ref 4.8–10.8)
WBC #/AREA URNS HPF: ABNORMAL /HPF

## 2024-01-01 PROCEDURE — 02HV33Z INSERTION OF INFUSION DEVICE INTO SUPERIOR VENA CAVA, PERCUTANEOUS APPROACH: ICD-10-PCS | Performed by: STUDENT IN AN ORGANIZED HEALTH CARE EDUCATION/TRAINING PROGRAM

## 2024-01-01 PROCEDURE — 5A1945Z RESPIRATORY VENTILATION, 24-96 CONSECUTIVE HOURS: ICD-10-PCS | Performed by: INTERNAL MEDICINE

## 2024-01-01 PROCEDURE — 99291 CRITICAL CARE FIRST HOUR: CPT | Mod: 25,GC | Performed by: INTERNAL MEDICINE

## 2024-01-01 PROCEDURE — 99291 CRITICAL CARE FIRST HOUR: CPT | Performed by: INTERNAL MEDICINE

## 2024-01-01 PROCEDURE — B548ZZA ULTRASONOGRAPHY OF SUPERIOR VENA CAVA, GUIDANCE: ICD-10-PCS | Performed by: STUDENT IN AN ORGANIZED HEALTH CARE EDUCATION/TRAINING PROGRAM

## 2024-01-01 PROCEDURE — 0BH17EZ INSERTION OF ENDOTRACHEAL AIRWAY INTO TRACHEA, VIA NATURAL OR ARTIFICIAL OPENING: ICD-10-PCS | Performed by: INTERNAL MEDICINE

## 2024-01-01 PROCEDURE — 99292 CRITICAL CARE ADDL 30 MIN: CPT | Performed by: INTERNAL MEDICINE

## 2024-01-01 RX ORDER — ONDANSETRON 4 MG/1
8 TABLET, ORALLY DISINTEGRATING ORAL EVERY 8 HOURS PRN
Status: DISCONTINUED | OUTPATIENT
Start: 2024-01-01 | End: 2024-01-01 | Stop reason: HOSPADM

## 2024-01-01 RX ORDER — ACETAMINOPHEN 325 MG/1
650 TABLET ORAL EVERY 4 HOURS PRN
Status: DISCONTINUED | OUTPATIENT
Start: 2024-01-01 | End: 2024-01-01 | Stop reason: HOSPADM

## 2024-01-01 RX ORDER — HEPARIN SODIUM 5000 [USP'U]/ML
5000 INJECTION, SOLUTION INTRAVENOUS; SUBCUTANEOUS EVERY 8 HOURS
Status: DISCONTINUED | OUTPATIENT
Start: 2024-01-01 | End: 2024-01-01

## 2024-01-01 RX ORDER — CARBOXYMETHYLCELLULOSE SODIUM 5 MG/ML
1 SOLUTION/ DROPS OPHTHALMIC PRN
Status: DISCONTINUED | OUTPATIENT
Start: 2024-01-01 | End: 2024-01-01 | Stop reason: HOSPADM

## 2024-01-01 RX ORDER — SODIUM CHLORIDE, SODIUM LACTATE, POTASSIUM CHLORIDE, AND CALCIUM CHLORIDE .6; .31; .03; .02 G/100ML; G/100ML; G/100ML; G/100ML
30 INJECTION, SOLUTION INTRAVENOUS ONCE
Status: COMPLETED | OUTPATIENT
Start: 2024-01-01 | End: 2024-01-01

## 2024-01-01 RX ORDER — THIAMINE HYDROCHLORIDE 100 MG/ML
100 INJECTION, SOLUTION INTRAMUSCULAR; INTRAVENOUS DAILY
Status: DISCONTINUED | OUTPATIENT
Start: 2024-01-01 | End: 2024-01-01

## 2024-01-01 RX ORDER — DEXTROSE MONOHYDRATE 25 G/50ML
50 INJECTION, SOLUTION INTRAVENOUS ONCE
Status: COMPLETED | OUTPATIENT
Start: 2024-01-01 | End: 2024-01-01

## 2024-01-01 RX ORDER — HYDROMORPHONE HYDROCHLORIDE 2 MG/ML
2 INJECTION, SOLUTION INTRAMUSCULAR; INTRAVENOUS; SUBCUTANEOUS
Status: DISCONTINUED | OUTPATIENT
Start: 2024-01-01 | End: 2024-01-01 | Stop reason: HOSPADM

## 2024-01-01 RX ORDER — SODIUM CHLORIDE, SODIUM LACTATE, POTASSIUM CHLORIDE, AND CALCIUM CHLORIDE .6; .31; .03; .02 G/100ML; G/100ML; G/100ML; G/100ML
500 INJECTION, SOLUTION INTRAVENOUS
Status: DISCONTINUED | OUTPATIENT
Start: 2024-01-01 | End: 2024-01-01 | Stop reason: HOSPADM

## 2024-01-01 RX ORDER — DEXTROSE MONOHYDRATE 50 MG/ML
INJECTION, SOLUTION INTRAVENOUS CONTINUOUS
Status: CANCELLED | OUTPATIENT
Start: 2024-01-01

## 2024-01-01 RX ORDER — NOREPINEPHRINE BITARTRATE 0.03 MG/ML
0-1 INJECTION, SOLUTION INTRAVENOUS CONTINUOUS
Status: DISCONTINUED | OUTPATIENT
Start: 2024-01-01 | End: 2024-01-01

## 2024-01-01 RX ORDER — ONDANSETRON 2 MG/ML
8 INJECTION INTRAMUSCULAR; INTRAVENOUS EVERY 8 HOURS PRN
Status: DISCONTINUED | OUTPATIENT
Start: 2024-01-01 | End: 2024-01-01 | Stop reason: HOSPADM

## 2024-01-01 RX ORDER — DEXTROSE MONOHYDRATE 25 G/50ML
25 INJECTION, SOLUTION INTRAVENOUS
Status: DISCONTINUED | OUTPATIENT
Start: 2024-01-01 | End: 2024-01-01

## 2024-01-01 RX ORDER — DEXTROSE MONOHYDRATE 25 G/50ML
25 INJECTION, SOLUTION INTRAVENOUS ONCE
Status: DISCONTINUED | OUTPATIENT
Start: 2024-01-01 | End: 2024-01-01

## 2024-01-01 RX ORDER — ACETAMINOPHEN 650 MG/1
650 SUPPOSITORY RECTAL EVERY 4 HOURS PRN
Status: DISCONTINUED | OUTPATIENT
Start: 2024-01-01 | End: 2024-01-01 | Stop reason: HOSPADM

## 2024-01-01 RX ORDER — LORAZEPAM 2 MG/ML
1 CONCENTRATE ORAL
Status: DISCONTINUED | OUTPATIENT
Start: 2024-01-01 | End: 2024-01-01 | Stop reason: HOSPADM

## 2024-01-01 RX ORDER — DEXMEDETOMIDINE HYDROCHLORIDE 4 UG/ML
.1-1.5 INJECTION, SOLUTION INTRAVENOUS CONTINUOUS
Status: DISCONTINUED | OUTPATIENT
Start: 2024-01-01 | End: 2024-01-01

## 2024-01-01 RX ORDER — MIDAZOLAM HYDROCHLORIDE 1 MG/ML
1 INJECTION INTRAMUSCULAR; INTRAVENOUS ONCE
Status: COMPLETED | OUTPATIENT
Start: 2024-01-01 | End: 2024-01-01

## 2024-01-01 RX ORDER — BISACODYL 10 MG
10 SUPPOSITORY, RECTAL RECTAL
Status: DISCONTINUED | OUTPATIENT
Start: 2024-01-01 | End: 2024-01-01

## 2024-01-01 RX ORDER — LORAZEPAM 2 MG/ML
1 INJECTION INTRAMUSCULAR
Status: DISCONTINUED | OUTPATIENT
Start: 2024-01-01 | End: 2024-01-01 | Stop reason: HOSPADM

## 2024-01-01 RX ORDER — AMOXICILLIN 250 MG
2 CAPSULE ORAL 2 TIMES DAILY
Status: DISCONTINUED | OUTPATIENT
Start: 2024-01-01 | End: 2024-01-01

## 2024-01-01 RX ORDER — ATROPINE SULFATE 10 MG/ML
2 SOLUTION/ DROPS OPHTHALMIC EVERY 4 HOURS PRN
Status: DISCONTINUED | OUTPATIENT
Start: 2024-01-01 | End: 2024-01-01 | Stop reason: HOSPADM

## 2024-01-01 RX ORDER — LINEZOLID 2 MG/ML
600 INJECTION, SOLUTION INTRAVENOUS EVERY 12 HOURS
Status: DISCONTINUED | OUTPATIENT
Start: 2024-01-01 | End: 2024-01-01

## 2024-01-01 RX ORDER — SODIUM CHLORIDE, SODIUM LACTATE, POTASSIUM CHLORIDE, AND CALCIUM CHLORIDE .6; .31; .03; .02 G/100ML; G/100ML; G/100ML; G/100ML
1000 INJECTION, SOLUTION INTRAVENOUS ONCE
Status: COMPLETED | OUTPATIENT
Start: 2024-01-01 | End: 2024-01-01

## 2024-01-01 RX ORDER — FAMOTIDINE 20 MG/1
20 TABLET, FILM COATED ORAL DAILY
Status: DISCONTINUED | OUTPATIENT
Start: 2024-01-01 | End: 2024-01-01

## 2024-01-01 RX ORDER — INSULIN LISPRO 100 [IU]/ML
1-6 INJECTION, SOLUTION INTRAVENOUS; SUBCUTANEOUS EVERY 6 HOURS
Status: DISCONTINUED | OUTPATIENT
Start: 2024-01-01 | End: 2024-01-01

## 2024-01-01 RX ORDER — POTASSIUM CHLORIDE 20 MEQ/1
20 TABLET, EXTENDED RELEASE ORAL DAILY
COMMUNITY

## 2024-01-01 RX ORDER — POLYETHYLENE GLYCOL 3350 17 G/17G
1 POWDER, FOR SOLUTION ORAL
Status: DISCONTINUED | OUTPATIENT
Start: 2024-01-01 | End: 2024-01-01

## 2024-01-01 RX ORDER — SODIUM CHLORIDE, SODIUM LACTATE, POTASSIUM CHLORIDE, CALCIUM CHLORIDE 600; 310; 30; 20 MG/100ML; MG/100ML; MG/100ML; MG/100ML
1000 INJECTION, SOLUTION INTRAVENOUS ONCE
Status: COMPLETED | OUTPATIENT
Start: 2024-01-01 | End: 2024-01-01

## 2024-01-01 RX ORDER — CALCIUM GLUCONATE 20 MG/ML
2 INJECTION, SOLUTION INTRAVENOUS ONCE
Status: COMPLETED | OUTPATIENT
Start: 2024-01-01 | End: 2024-01-01

## 2024-01-01 RX ORDER — FUROSEMIDE 40 MG/1
40 TABLET ORAL 2 TIMES DAILY
COMMUNITY

## 2024-01-01 RX ORDER — SODIUM CHLORIDE 450 MG/100ML
INJECTION, SOLUTION INTRAVENOUS CONTINUOUS
Status: DISCONTINUED | OUTPATIENT
Start: 2024-01-01 | End: 2024-01-01

## 2024-01-01 RX ORDER — HYDROMORPHONE HYDROCHLORIDE 2 MG/ML
4 INJECTION, SOLUTION INTRAMUSCULAR; INTRAVENOUS; SUBCUTANEOUS
Status: DISCONTINUED | OUTPATIENT
Start: 2024-01-01 | End: 2024-01-01 | Stop reason: HOSPADM

## 2024-01-01 RX ADMIN — LINEZOLID 600 MG: 600 INJECTION, SOLUTION INTRAVENOUS at 19:03

## 2024-01-01 RX ADMIN — SODIUM BICARBONATE 150 MEQ: 84 INJECTION, SOLUTION INTRAVENOUS at 03:14

## 2024-01-01 RX ADMIN — PROPOFOL 10 MCG/KG/MIN: 10 INJECTION, EMULSION INTRAVENOUS at 08:09

## 2024-01-01 RX ADMIN — FENTANYL CITRATE 50 MCG: 50 INJECTION, SOLUTION INTRAMUSCULAR; INTRAVENOUS at 05:59

## 2024-01-01 RX ADMIN — HYDROMORPHONE HYDROCHLORIDE 2 MG: 2 INJECTION, SOLUTION INTRAMUSCULAR; INTRAVENOUS; SUBCUTANEOUS at 14:58

## 2024-01-01 RX ADMIN — FENTANYL CITRATE 50 MCG: 50 INJECTION, SOLUTION INTRAMUSCULAR; INTRAVENOUS at 15:56

## 2024-01-01 RX ADMIN — Medication 50 MCG/HR: at 16:21

## 2024-01-01 RX ADMIN — NOREPINEPHRINE BITARTRATE 0.35 MCG/KG/MIN: 1 INJECTION, SOLUTION, CONCENTRATE INTRAVENOUS at 08:52

## 2024-01-01 RX ADMIN — LORAZEPAM 1 MG: 2 INJECTION INTRAMUSCULAR; INTRAVENOUS at 14:58

## 2024-01-01 RX ADMIN — HEPARIN SODIUM 5000 UNITS: 5000 INJECTION, SOLUTION INTRAVENOUS; SUBCUTANEOUS at 22:30

## 2024-01-01 RX ADMIN — FENTANYL CITRATE 100 MCG: 50 INJECTION, SOLUTION INTRAMUSCULAR; INTRAVENOUS at 06:24

## 2024-01-01 RX ADMIN — Medication 25 MCG/HR: at 07:46

## 2024-01-01 RX ADMIN — PROPOFOL 5 MCG/KG/MIN: 10 INJECTION, EMULSION INTRAVENOUS at 13:22

## 2024-01-01 RX ADMIN — SENNOSIDES AND DOCUSATE SODIUM 2 TABLET: 50; 8.6 TABLET ORAL at 18:11

## 2024-01-01 RX ADMIN — ALBUTEROL SULFATE 5 MG: 2.5 SOLUTION RESPIRATORY (INHALATION) at 14:23

## 2024-01-01 RX ADMIN — CALCIUM GLUCONATE 2 G: 20 INJECTION, SOLUTION INTRAVENOUS at 13:54

## 2024-01-01 RX ADMIN — CEFEPIME 2 G: 2 INJECTION, POWDER, FOR SOLUTION INTRAVENOUS at 17:57

## 2024-01-01 RX ADMIN — SODIUM BICARBONATE 150 MEQ: 84 INJECTION INTRAVENOUS at 16:22

## 2024-01-01 RX ADMIN — SODIUM BICARBONATE 150 MEQ: 84 INJECTION, SOLUTION INTRAVENOUS at 17:39

## 2024-01-01 RX ADMIN — HEPARIN SODIUM 5000 UNITS: 5000 INJECTION, SOLUTION INTRAVENOUS; SUBCUTANEOUS at 17:43

## 2024-01-01 RX ADMIN — DEXMEDETOMIDINE HYDROCHLORIDE 0.2 MCG/KG/HR: 100 INJECTION, SOLUTION INTRAVENOUS at 14:53

## 2024-01-01 RX ADMIN — Medication 150 MEQ: at 16:22

## 2024-01-01 RX ADMIN — THIAMINE HYDROCHLORIDE 100 MG: 100 INJECTION, SOLUTION INTRAMUSCULAR; INTRAVENOUS at 17:43

## 2024-01-01 RX ADMIN — SODIUM BICARBONATE 50 MEQ: 84 INJECTION INTRAVENOUS at 13:55

## 2024-01-01 RX ADMIN — NOREPINEPHRINE BITARTRATE 0.05 MCG/KG/MIN: 1 INJECTION, SOLUTION, CONCENTRATE INTRAVENOUS at 16:57

## 2024-01-01 RX ADMIN — SODIUM BICARBONATE 150 MEQ: 84 INJECTION, SOLUTION INTRAVENOUS at 22:27

## 2024-01-01 RX ADMIN — PROPOFOL 25 MCG/KG/MIN: 10 INJECTION, EMULSION INTRAVENOUS at 07:39

## 2024-01-01 RX ADMIN — MIDAZOLAM HYDROCHLORIDE 1 MG: 2 INJECTION, SOLUTION INTRAMUSCULAR; INTRAVENOUS at 12:34

## 2024-01-01 RX ADMIN — INSULIN LISPRO 1 UNITS: 100 INJECTION, SOLUTION INTRAVENOUS; SUBCUTANEOUS at 01:42

## 2024-01-01 RX ADMIN — FAMOTIDINE 20 MG: 20 TABLET, FILM COATED ORAL at 05:35

## 2024-01-01 RX ADMIN — VASOPRESSIN 0.03 UNITS/MIN: 20 INJECTION INTRAVENOUS at 20:03

## 2024-01-01 RX ADMIN — HEPARIN SODIUM 5000 UNITS: 5000 INJECTION, SOLUTION INTRAVENOUS; SUBCUTANEOUS at 05:41

## 2024-01-01 RX ADMIN — SODIUM CHLORIDE, POTASSIUM CHLORIDE, SODIUM LACTATE AND CALCIUM CHLORIDE 1000 ML: 600; 310; 30; 20 INJECTION, SOLUTION INTRAVENOUS at 08:54

## 2024-01-01 RX ADMIN — SODIUM CHLORIDE, POTASSIUM CHLORIDE, SODIUM LACTATE AND CALCIUM CHLORIDE 1158 ML: 600; 310; 30; 20 INJECTION, SOLUTION INTRAVENOUS at 13:00

## 2024-01-01 RX ADMIN — SODIUM CHLORIDE: 4.5 INJECTION, SOLUTION INTRAVENOUS at 06:45

## 2024-01-01 RX ADMIN — SODIUM CHLORIDE, POTASSIUM CHLORIDE, SODIUM LACTATE AND CALCIUM CHLORIDE 1000 ML: 600; 310; 30; 20 INJECTION, SOLUTION INTRAVENOUS at 13:00

## 2024-01-01 RX ADMIN — DEXTROSE MONOHYDRATE 50 ML: 25 INJECTION, SOLUTION INTRAVENOUS at 12:45

## 2024-01-01 RX ADMIN — INSULIN HUMAN 2 UNITS: 100 INJECTION, SOLUTION PARENTERAL at 13:52

## 2024-01-01 RX ADMIN — THIAMINE HYDROCHLORIDE 100 MG: 100 INJECTION, SOLUTION INTRAMUSCULAR; INTRAVENOUS at 05:38

## 2024-01-01 ASSESSMENT — PAIN DESCRIPTION - PAIN TYPE
TYPE: ACUTE PAIN

## 2024-01-01 ASSESSMENT — FIBROSIS 4 INDEX
FIB4 SCORE: 16.16
FIB4 SCORE: 5.16

## 2024-05-01 NOTE — DISCHARGE PLANNING
Hospital Care Management Discharge Planning       Action:   · Discussed patient is IDT rounds.   · Patient not medically cleared at this time.   · Still pending PT/OT recommendations for placement vs home.   · If patient is to return home, walking O2 assessment will need to be completed for discharge planning to proceed.      Plan:  · F/U with medical treatment team.   · Continue to provide support services and discharge planning as needed.    [Follow-Up: _____] : a [unfilled] follow-up visit [FreeTextEntry1] : [facility]

## 2024-05-15 PROBLEM — N17.9 ACUTE KIDNEY INJURY (HCC): Status: ACTIVE | Noted: 2024-01-01

## 2024-05-15 PROBLEM — E87.29 HIGH ANION GAP METABOLIC ACIDOSIS: Status: ACTIVE | Noted: 2024-01-01

## 2024-05-15 PROBLEM — G93.40 ACUTE ENCEPHALOPATHY: Status: ACTIVE | Noted: 2024-01-01

## 2024-05-15 PROBLEM — R74.01 TRANSAMINITIS: Status: ACTIVE | Noted: 2024-01-01

## 2024-05-15 PROBLEM — D64.9 CHRONIC ANEMIA: Status: ACTIVE | Noted: 2024-01-01

## 2024-05-15 PROBLEM — J96.01 ACUTE HYPOXIC RESPIRATORY FAILURE (HCC): Status: ACTIVE | Noted: 2024-01-01

## 2024-05-15 PROBLEM — A41.9 SEPSIS (HCC): Status: ACTIVE | Noted: 2024-01-01

## 2024-05-15 PROBLEM — E46 MALNUTRITION (HCC): Status: ACTIVE | Noted: 2024-01-01

## 2024-05-15 PROBLEM — E87.5 HYPERKALEMIA: Status: ACTIVE | Noted: 2024-01-01

## 2024-05-15 PROBLEM — E16.2 HYPOGLYCEMIA: Status: ACTIVE | Noted: 2024-01-01

## 2024-05-15 PROBLEM — E87.20 LACTIC ACIDOSIS: Status: ACTIVE | Noted: 2024-01-01

## 2024-05-15 NOTE — ASSESSMENT & PLAN NOTE
This is Septic shock Present on admission  SIRS criteria identified on my evaluation include: Hypothermia, with temperature less than 96.8 deg F, Tachycardia, with heart rate greater than 90 BPM, and Tachypnea, with respirations greater than 20 per minute  Clinical indicators of end organ dysfunction include Hypotension with systolic blood pressure less than 90 or MAP less than 65, Lactic Acid greater than 2, Toxic Metabolic Encephalopathy, Acute Respiratory Failure - (mechanical ventilation or BiPap or PaO2/FiO2 ratio < 300), Acute Renal Failure, with a finding of creatinine greater than 2 (patient does not have ESRD or CKD, and GCS < 15  Indicators of septic shock include: Sepsis present and lactate level is greater than 2mmol/L after adequate fluid resuscitation   Sources is: Unknown  Sepsis protocol initiated  Crystalloid Fluid Administration: Fluid resuscitation ordered per standard protocol - 30 mL/kg per current or ideal body weight  IV antibiotics as appropriate for source of sepsis  Reassessment: I have reassessed the patient's hemodynamic status    TRANSITION TO COMFORT CARE ONCE FRIEND ARRIVES  -Sepsis protocols initiated   -Telemetry monitoring  -Broad spectrum abx:  Cefepime 1g every 12 hours, stopped Linezolid 600 mg IV BID due to  MRSA nares negative  -Follow blood cultures, urine cultures  -CT abdomen/Pelvis without pending  -GOC discussion with family and friends  -Maintenance IVF with  mL bolus if MAP <65 or SBP <90  -Monitor UOP, goal of 0.5 ml/kg/hr  -Trend lactate  -MAP goals > 65, may need vasopressors

## 2024-05-15 NOTE — ASSESSMENT & PLAN NOTE
CO2 5, AG 27, LA 8.2  Combination of type a lactic acidosis and starvation ketoacidosis    IV fluid resuscitation  Trend lactate

## 2024-05-15 NOTE — ASSESSMENT & PLAN NOTE
Hgb 7.8 (7.5 since 2020)    -H&H every 12 hours  -Continue to monitor for signs of bleeding  -Transfuse for hemoglobin greater less than 7  -Iron panel/ferritin, reticulocyte count, B12

## 2024-05-15 NOTE — FLOWSHEET NOTE
"   05/15/24 1244   Events/Summary/Plan   Events/Summary/Plan Arrived intubated 6.0 ETT   Ventiliation   $ Ventilation - Initial Yes   Vent Clock Initiated Yes   Ventilator Management Group   Intensivist Group Yes   General Vent Information   Ventilator Number [id number]   Vent Mode APVCMV   Vent Alarms   Ventilation Alarms Reviewed / Activated Yes   Upper Pressure Limit Alarm 45   Lower Pressure Limit 15   Low VE Alarm 4   High Respiratory Rate Alarm 40   Low Respiratory Rate Alarm 4   Low VT Alarm 150   Apnea Parameters Checked / Activated Yes   Vent Settings   FiO2% 100 %   Rate (breaths/min) 25   Vent Temp HME Yes   Vt Target (mL) 300   TI (Seconds) 0.95   PEEP/CPAP 8   ETS(%) 50   Trigger Type Flow Trigger   Sensitivity Setting 5   Vent Readings   PIP 20    Minute Volume 9.1   Control VTE (exp VT) 352   f Total (Breaths/Min) 26   I:E Ratio 1\":1.6   MAP 13   PEEP/CPAP MONITORED 8.2   Static Compliance (ml / cm H2O) 49   R exp 13   Equipment Change   Equipment Change Next Due Date 24       "

## 2024-05-15 NOTE — FLOWSHEET NOTE
05/15/24 1312   Airway ETT Oral 6.0   Placement Date: 05/15/24   LDA Placed Prior to Arrival: Yes  Airway Placement: Central  Airway Type: ETT  Style: Cuffed  Airway Location: Oral  Airway Size: 6.0  Inserted In: (c)    Secured At  (cm) 24  (advanced after CRX)

## 2024-05-15 NOTE — ASSESSMENT & PLAN NOTE
K 6.9  Secondary to shock and SANDRA  S/p insulin 2 units in the ED  EKG with NSR, P waves seen, normal NM interval, normal QRS interval, QTc 427, no significant EKG changes  Secondary to SANDRA and acid/base balance    -IV insulin ordered  -IV fluid resuscitation  -Continue to monitor

## 2024-05-15 NOTE — DISCHARGE PLANNING
Acute medical Patient    Pt arrived via [facility] from Home. Her Home Health Nurse called medics, Pt. Was last seen 7 days ago. There is no POLST, NO ACP documents. Pt emergency contact is her spouse who is . Pt has not been to [facility] since . No other family contact information listed. KELLEY completed and Enform[employer] search       Person ID      Possible Relatives   Dennis LOO Alan 1st Degree : 1962   Age: 62   Phone Numbers   [phone] (Voice mail Left )   [phone] -( Number is not in service)   Mobile Phone Numbers   [phone]  (Disconnected)    [email or web address]    [address] [place] [address]    KELLEY will continue to locate family. YENY unclear who the Home Health agency was.   KELLEY reviewed Pt  husbands chart. It states her  was her primary caregiver and they have a son in [place]. KELLEY will continue to try to locate Pt son at the above numbers.     ERP notified.

## 2024-05-15 NOTE — ASSESSMENT & PLAN NOTE
BUNs/CR 75/3.55 (9/0.41 in 2020),   Likely secondary to prerenal vs possible intrarenal    -Maintain euvolemia and monitor fluid responsiveness  -MAP >65  -Avoid nephrotoxins and renally dose medications  -Monitor renal function and urine output

## 2024-05-15 NOTE — H&P
[facility] Internal Medicine History & Physical Note    Date of Service  5/15/2024    [facility] Team: [facility] ICU Gold Team   Attending: Dr. Alejandro  Senior Resident: Dr. Larson    Contact Number: [phone]    Primary Care Physician  Denver J Miller, M.D.    Consultants  NA    Specialist Names: NA    Code Status  Full Code    Chief Complaint  Chief Complaint   Patient presents with    Respiratory Distress     Pt found by home health unresponsive. Pt intubated pta     Unresponsive    Low Blood Sugar     Fs-35 pta        History of Presenting Illness (HPI):     Patient is a 83 y.o. 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    Unable to obtain history from patient due to critical illness, history obtained from chart review, alternative  .    Temp 96F, pulse 60-100s, hypotensive 51/31, 92% on ventilator. Hgb 7.8 (7.5 since ), K6.9, CO2 5, AG 27, glucose 400, BUNs/CR 75/3.55 (9/0.41 in ), AST//99 (128/87 on ), albumin 2.8, total protein 5.2, lactic acid 8.3.  Troponin 95, BNP 23, INR 1.33.  APV-CMV 25/100/8/300, ABG 6.945/35/56.  CXR with no acute abnormalities and emphysema.  EKG with NSR, P waves seen, normal NH interval, normal QRS interval, QTc 427.  Patient received sodium bicarb 50 mEq, Versed 1 mg, LR 30 cc/kg (1.1L), LR 1L, insulin 2 units, D5W 50 mL, albuterol nebulizer in the ED.  Patient will be admitted for management of acute encephalopathy required intubation and shock.    I discussed the plan of care with patient, family, bedside RN, charge RN, , and pharmacy.    Review of Systems  Review of Systems   Unable to perform ROS: Critical illness       Past Medical History   has a past medical history of Chronic  obstructive pulmonary disease with acute exacerbation (HCC) (2/14/2020) and Peptic ulcer.    Surgical History   has a past surgical history that includes appendectomy; gastroscopy (N/A, 2/14/2020); colonoscopy with polyp (N/A, 2/16/2020); and colon resection robotic xi (2/18/2020).     Family History  family history is not on file.   Family history reviewed with patient.     Social History  Tobacco: Please see HPI  Alcohol: Please see HPI  Recreational drugs (illegal or prescription): Please see HPI  Employment: Please see HPI  Living Situation: Please see HPI  Recent Travel: Please see HPI  Primary Care Provider: Reviewed    Allergies  Allergies   Allergen Reactions    Sulfa Drugs     Tetracycline        Medications  Prior to Admission Medications   Prescriptions Last Dose Informant Patient Reported? Taking?   atorvastatin (LIPITOR) 80 MG tablet   No No   Sig: Take 1 Tab by mouth every day.   busPIRone (BUSPAR) 5 MG tablet   Yes No   Sig: Take 5 mg by mouth 3 times a day.   furosemide (LASIX) 20 MG Tab   No No   Sig: Take 1 Tab by mouth 2 Times a Day.   gabapentin (NEURONTIN) 100 MG Cap   No No   Sig: Take 1 Cap by mouth 3 times a day.   omeprazole (PRILOSEC) 20 MG delayed-release capsule   Yes No   Sig: Take 20 mg by mouth 2 times a day.      Facility-Administered Medications: None       Physical Exam  Temp:  [35.6 °C (96 °F)] 35.6 °C (96 °F)  Pulse:  [] 101  Resp:  [11-44] 18  BP: ()/(28-78) 86/44  SpO2:  [73 %-100 %] 92 %  Blood Pressure : 116/78   Temperature: (!) 35.6 °C (96 °F)   Pulse: 87   Respiration: 16   Pulse Oximetry: (!) 85 %       Physical Exam  Constitutional:       Appearance: She is ill-appearing.   HENT:      Head: Normocephalic and atraumatic.      Right Ear: External ear normal.      Left Ear: External ear normal.      Nose: Nose normal.      Mouth/Throat:      Mouth: Mucous membranes are dry.      Comments: ET tube in place  Eyes:      Pupils: Pupils are equal, round, and reactive  to light.   Cardiovascular:      Rate and Rhythm: Normal rate and regular rhythm.   Pulmonary:      Effort: Pulmonary effort is normal. No respiratory distress.      Breath sounds: Normal breath sounds. No stridor. No wheezing, rhonchi or rales.   Abdominal:      General: Abdomen is flat. There is no distension.      Palpations: Abdomen is soft.      Tenderness: There is no abdominal tenderness.   Musculoskeletal:      Cervical back: Normal range of motion.      Right lower leg: No edema.      Left lower leg: No edema.   Skin:     General: Skin is dry.   Neurological:      Comments: Intubated, pupils conjugate, no gaze deviation, PERRLA, moves all 4 extremities in response to pain   Psychiatric:      Comments: Unable to access         Laboratory:  Recent Labs     05/15/24  1240   WBC 9.4   RBC 2.88*   HEMOGLOBIN 7.8*   HEMATOCRIT 28.7*   MCV 99.7*   MCH 27.1   MCHC 27.2*   RDW 65.9*   PLATELETCT 278   MPV 10.0     Recent Labs     05/15/24  1240   SODIUM 143   POTASSIUM 6.9*   CHLORIDE 111   CO2 5*   GLUCOSE 400*   BUN 75*   CREATININE 3.55*   CALCIUM 8.5     Recent Labs     05/15/24  1240   ALTSGPT 99*   ASTSGOT 170*   ALKPHOSPHAT 51   TBILIRUBIN 0.2   GLUCOSE 400*     Recent Labs     05/15/24  1240 05/15/24  1750   APTT 27.8  --    INR 1.33* 1.27*     Recent Labs     05/15/24  1240   NTPROBNP 1203*     Recent Labs     05/15/24  1240   TRIGLYCERIDE 123     Recent Labs     05/15/24  1240   TROPONINT 95*       Imaging:  DX-CHEST-PORTABLE (1 VIEW)   Final Result      1.  Well-positioned right IJ central catheter. No pneumothorax.   2.  Hyperinflation. No focal consolidation. No effusions.         CT-HEAD W/O   Final Result      1.  No acute intracranial abnormality.   2.  Senescent changes            DX-CHEST-PORTABLE (1 VIEW)   Final Result      1.  No acute cardiac or pulmonary abnormalities are identified.   2.  Endotracheal tube tip above the thoracic inlet   3.  Enteric tube tip projects over the stomach   4.   Emphysema      EC-ECHOCARDIOGRAM COMPLETE W/O CONT    (Results Pending)       X-Ray:  I have personally reviewed the images and compared with prior images.  EKG:  I have personally reviewed the images and compared with prior images.    Assessment/Plan:  Problem Representation:   I anticipate this patient will require at least two midnights for appropriate medical management, necessitating inpatient admission because of management of acute encephalopathy requiring intubation and shock    Patient will need a ICU (Adult and Pediatrics) bed on EMERGENCY service .  The need is secondary to management of acute encephalopathy requiring intubation and shock.    * Acute encephalopathy- (present on admission)  Assessment & Plan  Found unresponsive by home health, GCS 3, intubated for airway protection, fasting blood sugar 35  DDx includes neurologic, endocrine, metabolic, infectious, hypovolemic shock, distributive shock including septic shock    -Telemetry monitoring  -Fall precautions, seizure precautions, aspiration precautions  -Ventilation management as below for acute hypoxic respiratory failure  -Management as below for sepsis  -Hemodynamic support with IVF and pressors for MAP >65  -Empiric antibiotics with Cefepime and Linezolid, MRSA nares ordered, follow-up on blood cultures  -TSH, ammonia, B12, cortisol, A1C, beta-hydroxybutyrate ordered  -Empiric thiamine  -CT head without pending  -Echocardiogram ordered  -Social work to assist on locating next of kin for [facility]    Sepsis (HCC)  Assessment & Plan  This is Septic shock Present on admission  SIRS criteria identified on my evaluation include: Hypothermia, with temperature less than 96.8 deg F, Tachycardia, with heart rate greater than 90 BPM, and Tachypnea, with respirations greater than 20 per minute  Clinical indicators of end organ dysfunction include Hypotension with systolic blood pressure less than 90 or MAP less than 65, Lactic Acid greater than 2, Toxic  Metabolic Encephalopathy, Acute Respiratory Failure - (mechanical ventilation or BiPap or PaO2/FiO2 ratio < 300), Acute Renal Failure, with a finding of creatinine greater than 2 (patient does not have ESRD or CKD, and GCS < 15  Indicators of septic shock include: Sepsis present and lactate level is greater than 2mmol/L after adequate fluid resuscitation   Sources is: Unknown  Sepsis protocol initiated  Crystalloid Fluid Administration: Fluid resuscitation ordered per standard protocol - 30 mL/kg per current or ideal body weight  IV antibiotics as appropriate for source of sepsis  Reassessment: I have reassessed the patient's hemodynamic status    -Sepsis protocols initiated   -Telemetry monitoring  -Broad spectrum abx:  Cefepime 2g every 12 hours and Linezolid 600 mg IV BID due to SANDRA, D/C linezolid if MRSA nares negative  -Follow MRSA nares, blood cultures, urine cultures  -Maintenance IVF with  mL bolus if MAP <65 or SBP <90  -Monitor UOP, goal of 0.5 ml/kg/hr  -Trend lactate  -MAP goals > 65, may need vasopressors    Acute hypoxic respiratory failure (HCC)  Assessment & Plan  Intubated: 5/15/2024 for GCS of 3    -Telemetry monitoring  -Continuous pulse oximetry  -Goal saturation >92%  -Monitor ventilator waveforms and titrate flow/peep and volumes according.  -Lung protective ventilation strategy w/ A-F bundle  -CXR to monitor lung volumes and tube/line placement  -VAP bundle prevention, oral care, post pyloric feeding  -Head of bed > 30 degree  -GI prophylaxis  -Daily awakening and SBT trials unless contraindicated  -Monitor for liberation  -Respiratory treatments: prn    Hyperkalemia  Assessment & Plan  K 6.9  Secondary to shock and SANDRA  S/p insulin 2 units in the ED  EKG with NSR, P waves seen, normal OR interval, normal QRS interval, QTc 427, no significant EKG changes    -IV insulin ordered  -IV fluid resuscitation  -Continue to monitor    Lactic acidosis  Assessment & Plan  Lactic acid 8.3  Likely  type a lactic acidosis    IV fluid resuscitation  Trend lactate    Acute kidney injury (HCC)  Assessment & Plan  BUNs/CR 75/3.55 (9/0.41 in 2020)  Likely secondary to prerenal vs possible intrarenal    -Urinalysis and CPK  -Maintain euvolemia and monitor fluid responsiveness  -MAP >65  -Avoid nephrotoxins and renally dose medications  -Monitor renal function and urine output    Hypoglycemia  Assessment & Plan  Fasting blood sugar 35, now glucose 400s D5W 50 mL    SSI, hypoglycemia protocol, A1c, beta-hydroxybutyrate ordered    High anion gap metabolic acidosis  Assessment & Plan  CO2 5, AG 27, LA 8.2  Combination of type a lactic acidosis and starvation ketoacidosis    IV fluid resuscitation  Trend lactate    Chronic anemia  Assessment & Plan  Hgb 7.8 (7.5 since 2020)    -H&H every 12 hours  -Continue to monitor for signs of bleeding  -Transfuse for hemoglobin greater less than 7  -Iron panel/ferritin, reticulocyte count, B12    Transaminitis  Assessment & Plan  AST//99 (128/87 on 2019), unclear etiology possibly secondary to shock    -Avoid hepatotoxins  -Consider RUQ ultrasound workup once improving    Malnutrition (HCC)  Assessment & Plan  BMI 16.6, weight 85, albumin 2.8, total protein 5.2 when hypovolemic    Dietary consult    Pulmonary emphysema (HCC)- (present on admission)  Assessment & Plan  History of per chart review, no PFTs    -Continue to monitor        VTE prophylaxis: heparin ppx

## 2024-05-15 NOTE — ED NOTES
Med Rec complete per pharmacy   Allergies reviewed  Antibiotics in the past 30 days:no  Anticoagulant in past 14 days:no  Pharmacy patient utilizes:[facility] [address]    Per pharmacy Lisinopril 5 mg QD last dispensed on 11/2023 for 90 DS and Plavix 75 mg QD last dispensed in 11/2023 for a 90 DS (not put in med rec)    Attempted to contact spouse and phone keeps answering but no response from a person just a lot of background noise.

## 2024-05-15 NOTE — ASSESSMENT & PLAN NOTE
Intubated: 5/15/2024 for GCS of 3    TRANSITION TO COMFORT CARE ONCE FRIEND ARRIVES  -Telemetry monitoring  -Continuous pulse oximetry  -Goal saturation >92%  -Monitor ventilator waveforms and titrate flow/peep and volumes according.  -Lung protective ventilation strategy w/ A-F bundle  -CXR to monitor lung volumes and tube/line placement  -VAP bundle prevention, oral care, post pyloric feeding  -Head of bed > 30 degree  -GI prophylaxis  -Daily awakening and SBT trials unless contraindicated  -Monitor for liberation  -Respiratory treatments: prn

## 2024-05-15 NOTE — DISCHARGE PLANNING
SW attempted to contact Dennis Phillips, assumed to be Pt. Son at   [phone] (Voice mail Left ) .

## 2024-05-15 NOTE — ED PROVIDER NOTES
ED Provider Note    CHIEF COMPLAINT  Chief Complaint   Patient presents with    Respiratory Distress     Pt found by home health unresponsive. Pt intubated pta     Unresponsive    Low Blood Sugar     Fs-35 pta      EXTERNAL RECORDS REVIEWED  Records reviewed showed that the patient has no records at this facility since . Her  was seen here frequently and  back in March. Social work reports that the  was the patient's primary care giver. The patient has a known history of dementia. The patient has a son in [place] that social work is trying to reach.       HPI/ROS  LIMITATION TO HISTORY   Select: Patient is unresponsive upon arrival.  OUTSIDE HISTORIAN(S):  EMS     Tameka Montanez is a 83 y.o. female who presents to the Emergency Department via EMS with respiratory distress, unresponsive and low blood sugar. EMS state that tthe patient was found by Home Health staff unresponsive on her couch. En route, the patient was breathing 2-3x/min and had a GCS 3.  The patient was then intubated. EMS state that they were unable to get a blood pressure, but state that she had a strong pulse of 85 the entire time en route. EMS gave the patient D10, however had no response from the patient. The patient has a history of CHF and dementia, per EMS. She was last seen by her home health nurse about a week ago.  Unsure of metal status baseline.    PAST MEDICAL HISTORY  Past Medical History:   Diagnosis Date    Chronic obstructive pulmonary disease with acute exacerbation (HCC) 2020    Peptic ulcer    CHF  Dementia     SURGICAL HISTORY  Past Surgical History:   Procedure Laterality Date    COLON RESECTION ROBOTIC XI  2020    Procedure: RIGHT BRADY COLECTOMY, ROBOT-ASSISTED, USING DA LON XI;  Surgeon: Wes Pat M.D.;  Location: SURGERY [facility];  Service: General    COLONOSCOPY WITH POLYP N/A 2020    Procedure: COLONOSCOPY, WITH POLYPECTOMY, tattoo ink, biopsies;  Surgeon: Jc ALVAREZ  GUZMAN Rolon;  Location: SURGERY [facility];  Service: Gastroenterology    GASTROSCOPY N/A 2/14/2020    Procedure: GASTROSCOPY;  Surgeon: Jc Rolon M.D.;  Location: SURGERY SAME DAY [facility];  Service: Gastroenterology    APPENDECTOMY          FAMILY HISTORY  No family history noted.     SOCIAL HISTORY   reports that she has been smoking cigarettes. She has a 42 pack-year smoking history. She has never used smokeless tobacco. She reports that she does not drink alcohol and does not use drugs.    CURRENT MEDICATIONS  Previous Medications    ASPIRIN (ASA) 81 MG CHEW TAB CHEWABLE TABLET    Take 1 Tab by mouth every day.    ATORVASTATIN (LIPITOR) 80 MG TABLET    Take 1 Tab by mouth every day.    BUSPIRONE (BUSPAR) 5 MG TABLET    Take 5 mg by mouth 3 times a day.    FUROSEMIDE (LASIX) 20 MG TAB    Take 1 Tab by mouth 2 Times a Day.    GABAPENTIN (NEURONTIN) 100 MG CAP    Take 1 Cap by mouth 3 times a day.    OMEPRAZOLE (PRILOSEC) 20 MG DELAYED-RELEASE CAPSULE    Take 20 mg by mouth 2 times a day.       ALLERGIES  Sulfa drugs and Tetracycline    PHYSICAL EXAM  BP (!) 51/31   Pulse 67   Temp (!) 35.6 °C (96 °F) (Temporal)   Resp 14   Ht 1.524 m (5')   Wt 38.6 kg (85 lb)   SpO2 92%      Constitutional: Cachetic, chronically ill-appearing, intubated.  HENT: Normocephalic, Atraumatic. Bilateral external ears normal. Nose normal.  Dry mucous membranes.  Oropharynx clear.  Eyes: Pupils are equal and reactive. Conjunctiva normal.   Neck: Supple, full range of motion  Heart: Regular rate and rhythm.  No murmurs.    Lungs: Bilateral breath sounds with bagging   Abdomen Soft, no distention.    Musculoskeletal: Atraumatic. No obvious deformities noted.  No lower extremity edema.  Skin: Warm, Dry.  No erythema, No rash.   Neurologic: Intubated, GCS 3-T  Psychiatric: Unable to obtain.     DIAGNOSTIC STUDIES / PROCEDURES    EKG  I have independently interpreted this EKG  Results for orders placed or performed  during the hospital encounter of 05/15/24   EKG   Result Value Ref Range    Report       [facility].    Test Date:  2024-05-15  Pt Name:    HENRY WILKES       Department: ER  MRN:        [id number]                      Room:       RD 08  Gender:     Female                       Technician:  :        [id number]                   Requested By:HARRIET HUFFMAN  Order #:    [id number]                    Reading MD: Harriet Huffman MD    Measurements  Intervals                                Axis  Rate:       90                           P:          88  NY:         160                          QRS:        81  QRSD:       112                          T:          -11  QT:         349  QTc:        427    Interpretive Statements  Sinus rhythm  Borderline intraventricular conduction delay  Borderline repol abnrm, inferolateral leads  No STEMI  Compared to ECG 2020 11:00:07  Conduction delay new, otherwise no change  Electronically Signed On 05- 13:32:21 PDT by Harriet Huffman MD       LABS  Labs Reviewed   LACTIC ACID - Abnormal; Notable for the following components:       Result Value    Lactic Acid 8.3 (*)     All other components within normal limits   CBC WITH DIFFERENTIAL - Abnormal; Notable for the following components:    RBC 2.88 (*)     Hemoglobin 7.8 (*)     Hematocrit 28.7 (*)     MCV 99.7 (*)     MCHC 27.2 (*)     RDW 65.9 (*)     Neutrophils-Polys 87.90 (*)     Lymphocytes 5.20 (*)     Neutrophils (Absolute) 8.26 (*)     Lymphs (Absolute) 0.49 (*)     All other components within normal limits   COMP METABOLIC PANEL - Abnormal; Notable for the following components:    Potassium 6.9 (*)     Co2 5 (*)     Anion Gap 27.0 (*)     Glucose 400 (*)     Bun 75 (*)     Creatinine 3.55 (*)     AST(SGOT) 170 (*)     ALT(SGPT) 99 (*)     Albumin 2.8 (*)     Total Protein 5.2 (*)     All other components within normal limits   TROPONIN - Abnormal; Notable for the following  components:    Troponin T 95 (*)     All other components within normal limits   PROBRAIN NATRIURETIC PEPTIDE, NT - Abnormal; Notable for the following components:    NT-proBNP 1203 (*)     All other components within normal limits   PROTHROMBIN TIME - Abnormal; Notable for the following components:    PT 16.7 (*)     INR 1.33 (*)     All other components within normal limits   ESTIMATED GFR - Abnormal; Notable for the following components:    GFR (CKD-EPI) 12 (*)     All other components within normal limits   AMMONIA - Abnormal; Notable for the following components:    Ammonia 60 (*)     All other components within normal limits   PROTHROMBIN TIME - Abnormal; Notable for the following components:    PT 16.1 (*)     INR 1.27 (*)     All other components within normal limits   TSH WITH REFLEX TO FT4 - Abnormal; Notable for the following components:    TSH 17.400 (*)     All other components within normal limits   VITAMIN B12 - Abnormal; Notable for the following components:    Vitamin B12 -True Cobalamin 3882 (*)     All other components within normal limits   CORTISOL - Abnormal; Notable for the following components:    Cortisol 67.3 (*)     All other components within normal limits   CREATINE KINASE - Abnormal; Notable for the following components:    CPK Total 197 (*)     All other components within normal limits   RETICULOCYTES COUNT - Abnormal; Notable for the following components:    Imm. Reticulocyte Fraction 25.9 (*)     Retic Hgb Equivalent 26.3 (*)     All other components within normal limits   IRON/TOTAL IRON BIND - Abnormal; Notable for the following components:    Iron 10 (*)     Total Iron Binding 183 (*)     % Saturation 5 (*)     All other components within normal limits   RETICULOCYTES COUNT - Abnormal; Notable for the following components:    Imm. Reticulocyte Fraction 29.1 (*)     Retic Hgb Equivalent 25.2 (*)     All other components within normal limits   IRON/TOTAL IRON BIND - Abnormal;  Notable for the following components:    Iron 12 (*)     Total Iron Binding 212 (*)     % Saturation 6 (*)     All other components within normal limits   FREE THYROXINE - Abnormal; Notable for the following components:    Free T-4 0.87 (*)     All other components within normal limits   LACTIC ACID - Abnormal; Notable for the following components:    Lactic Acid 5.6 (*)     All other components within normal limits   POCT GLUCOSE DEVICE RESULTS - Abnormal; Notable for the following components:    POC Glucose, Blood 312 (*)     All other components within normal limits   POCT ARTERIAL BLOOD GAS DEVICE RESULTS - Abnormal; Notable for the following components:    Ph 6.945 (*)     Po2 56 (*)     Tco2 9 (*)     S02 69 (*)     Hco3 7.6 (*)     BE -23 (*)     All other components within normal limits   POCT ARTERIAL BLOOD GAS DEVICE RESULTS - Abnormal; Notable for the following components:    Po2 438 (*)     S02 100 (*)     BE -5 (*)     Po2 Temp Cor 424 (*)     All other components within normal limits   POCT LACTATE DEVICE RESULTS - Abnormal; Notable for the following components:    iStat Lactate 5.3 (*)     All other components within normal limits   POCT GLUCOSE DEVICE RESULTS - Abnormal; Notable for the following components:    POC Glucose, Blood 132 (*)     All other components within normal limits   POCT GLUCOSE DEVICE RESULTS - Abnormal; Notable for the following components:    POC Glucose, Blood 145 (*)     All other components within normal limits   COD (ADULT)   TRIGLYCERIDE   APTT   DIFFERENTIAL MANUAL   PERIPHERAL SMEAR REVIEW   PLATELET ESTIMATE   MORPHOLOGY   PROCALCITONIN   HEMOGLOBIN A1C   FERRITIN   LACTIC ACID   LACTIC ACID   URINALYSIS   URINE CULTURE(NEW)   BLOOD CULTURE   BLOOD CULTURE   MRSA BY PCR (AMP)   BETA-HYDROXYBUTYRIC ACID   CBC WITH DIFFERENTIAL   COMP METABOLIC PANEL   FERRITIN   FREE THYROXINE   T3 FREE   LACTIC ACID   POCT GLUCOSE   POCT GLUCOSE   POCT GLUCOSE   POCT GLUCOSE        RADIOLOGY  I have independently interpreted the diagnostic imaging associated with this visit and am waiting the final reading from the radiologist.   My preliminary interpretation is as follows: ET tube slightly shallow, no infiltrate, large lungs    Radiologist interpretation:  DX-CHEST-PORTABLE (1 VIEW)   Final Result      1.  Well-positioned right IJ central catheter. No pneumothorax.   2.  Hyperinflation. No focal consolidation. No effusions.         CT-HEAD W/O   Final Result      1.  No acute intracranial abnormality.   2.  Senescent changes            DX-CHEST-PORTABLE (1 VIEW)   Final Result      1.  No acute cardiac or pulmonary abnormalities are identified.   2.  Endotracheal tube tip above the thoracic inlet   3.  Enteric tube tip projects over the stomach   4.  Emphysema      EC-ECHOCARDIOGRAM COMPLETE W/O CONT    (Results Pending)   DX-CHEST-PORTABLE (1 VIEW)    (Results Pending)         COURSE & MEDICAL DECISION MAKING    12:25 PM - Patient seen and examined at bedside. Respiratory is present for ABG. Patient was unresponsive upon arrival. The patient will be resuscitated with 1L NS IV and medicated with D50W 50 mL, propofol, and Versed 1 mg. Ordered for CT-Head w/o, DX-chest, COD, Triglycerdie, troponin, proBrain Natriuretic peptide, Prothrombin time, APTT, Lactic acid, CBC with diff, CMP, UA, Urine culture, Blood culture, Triglycerides starting now and then, EKG to evaluate her symptoms.     12:31 PM - Patient's blood sugar is 312.  Glide scope used to verify airway and shows tube through the cords.    12:33 PM - Patient was given 1 mg of Versed for sedation    1:00PM - Blood pressure improving and patient more awake, will start propofol for sedation    Sepsis: Infection was suspected 12:30PM (Time). Sepsis pathway was initiated. 30cc/kg bolus given. Antibiotics were given per protocol.  Antibiotics were not initially given as there was no source of infection identified.      ASSESSMENT,  COURSE AND PLAN  Care Narrative: Elderly patient with reported history of dementia who presents after being found unresponsive by her home health nurse.  Last seen normal about a week ago.  She has no family and no advanced directives or POLST therefore she was intubated in the field by EMS.  On arrival here, she was found to be profoundly hypotensive as well as hypoxic despite bagging.  Fluids were started and blood pressure did slowly respond.  EKG does not show evidence of ischemia or arrhythmia.  Troponin only minimally elevated at 95.  BNP was elevated at 1203 however no evidence of pulmonary edema or pneumonia on chest x-ray.  Endotracheal tube was shallow and advanced 2 cm.  Blood gas shows significant metabolic acidosis.  Lactate initially 8.3 with associated acute renal failure and hyperkalemia.  Patient was given fluids, bicarb, insulin and dextrose along with albuterol.  No leukocytosis or focal signs of infection.  She is anemic however appears somewhat consistent in the past and no signs of active bleeding.  CT head does not show evidence of intracranial hemorrhage or mass.  Social work attempted to get in touch with the patient's son who is her only family member however we were unable to get in touch with them for further goals of care.  Plans admit to the ICU for ongoing management.      1:47 PM - I discussed the patient's case and the above findings with Dr. Valencia (Hospitalist) who will evaluate the patient for admission. The patient was updated at this time.     ADDITIONAL PROBLEM LIST  Problem #1: Hypovolemic shock -given IV fluids with improvement of blood pressure, lactate significantly elevated downtrending.    Problem #2: Acute renal failure -likely related to above, continue to monitor, given fluids and bicarbonate    Problem #3: Hyperkalemia -given insulin, dextrose, calcium, albuterol      DISPOSITION AND DISCUSSIONS  I have discussed management of the patient with the following  physicians and ANDREW's:  Dr. Valencia (Hospitalist)     Discussion of management with other QHP or appropriate source(s): Pharmacy   and RT          Decision tools and prescription drugs considered including, but not limited to: Antibiotics no signs of focal infection .      CRITICAL CARE  The very real possibilty of a deterioration of this patient's condition required the highest level of my preparedness for sudden, emergent intervention.  I provided critical care services, which included medication orders, frequent reevaluations of the patient's condition and response to treatment, ordering and reviewing test results, and discussing the case with various consultants.  The critical care time associated with the care of the patient was 44 minutes. Review chart for interventions. This time is exclusive of any other billable procedures.       DISPOSITION:  Patient will be hospitalized by Dr. Valencia in critical condition.     FINAL DIAGNOSIS  1. Acute respiratory failure, unspecified whether with hypoxia or hypercapnia (HCC)    2. Acute renal failure, unspecified acute renal failure type (HCC)    3. Hyperkalemia    4. Lactic acidosis    5. Hypovolemic shock (HCC)      The note accurately reflects work and decisions made by me.  Harriet Mena M.D.  5/15/2024  9:07 PM     Guerline SORIA (Kay), am scribing for, and in the presence of, Harriet Mena M.D..    Electronically signed by: Guerline Mac), 5/15/2024    Harriet SORIA M.D. personally performed the services described in this documentation, as scribed by Guerline Stephens in my presence, and it is both accurate and complete.

## 2024-05-15 NOTE — ED NOTES
Unable to obtain med rec at this time. Patient unable to participate (intubated). Attempted to contact spouse and phone keeps answering but no response from a person just a lot of background noise. Pharmacy is closed for lunch at this time

## 2024-05-15 NOTE — ASSESSMENT & PLAN NOTE
Fasting blood sugar 35, now glucose 400s D5W 50 mL    SSI, hypoglycemia protocol, A1c, beta-hydroxybutyrate ordered

## 2024-05-15 NOTE — ED TRIAGE NOTES
Pt to  red 8 .  Chief Complaint   Patient presents with    Respiratory Distress     Pt found by home health unresponsive. Pt intubated pta     Unresponsive    Low Blood Sugar     Fs-35 pta     ERP/respiratory at bedside

## 2024-05-15 NOTE — ASSESSMENT & PLAN NOTE
Found unresponsive by home health, GCS 3, intubated for airway protection, fasting blood sugar 35  DDx includes neurologic, endocrine, metabolic, infectious, hypovolemic shock, distributive shock including septic shock    TRANSITION TO COMFORT CARE ONCE FRIEND ARRIVES  -Telemetry monitoring  -Fall precautions, seizure precautions, aspiration precautions  -Ventilation management as below for acute hypoxic respiratory failure  -Management as below for sepsis  -Hemodynamic support with IVF and pressors for MAP >65  -Empiric antibiotics with Cefepime and Linezolid, MRSA nares ordered, follow-up on blood cultures  -TSH, ammonia, B12, cortisol, A1C, beta-hydroxybutyrate ordered  -Empiric thiamine  -CT head without pending  -Echocardiogram ordered  -Social work to assist on locating next of kin for GOC

## 2024-05-15 NOTE — ASSESSMENT & PLAN NOTE
AST//99 (128/87 on 2019), unclear etiology possibly secondary to shock  Uptrending    -Avoid hepatotoxins  -CT Abdomen/Pelvis canceled due to transition to comfort care

## 2024-05-16 PROBLEM — R65.21 SEPTIC SHOCK (HCC): Status: ACTIVE | Noted: 2024-01-01

## 2024-05-16 PROBLEM — Z71.89 GOALS OF CARE, COUNSELING/DISCUSSION: Status: ACTIVE | Noted: 2024-01-01

## 2024-05-16 PROBLEM — A41.9 SEPTIC SHOCK (HCC): Status: ACTIVE | Noted: 2024-01-01

## 2024-05-16 NOTE — DIETARY
"Nutrition services: Day 1 of admit.  Tameka Montanez is a 83 y.o. female with admitting DX of .admtidx    Consult received for intiation of tube feeds, consult in at 0610 this a.m.     RD attended IDT rounds: pt is anticipated to transition to comfort care measures today as this is what pt would want per son and friend Pura. Per MD notes: \"...close friend of 20 years, Pura, discussed hospital course and prognosis, and decided to transition to comfort care, would like to visit today before work at 11 to say goodbye before transition to comfort care.\"    Pt with dx of malnutrition: \"BMI 16.6, weight 85, albumin 2.8, total protein 5.2 when hypovolemic.\" Full assessment not appropriate in setting of anticipated transition to comfort care measures; friend Pura at bedside following IDT rounds. RD was able to briefly visualize pt this a.m. prior to IDT rounds: severe temporal wasting noted; unable to assess buccal fat due ET/padding. Per clinical skin photos, ribs apparent, some spinal protrusion in T-spine region.  Wt hx per chart review is limited: pt loss 15 lb (15%) at some point during or over the course of the past 4 years. Current BMI = 16.79, underweight status.  05/16/24 39 kg (85 lb 15.7 oz)  02/23/20 45.8 kg (100 lb 15.5 oz)    Malnutrition Risk: Hospital dx of malnutrition. RD agrees: pt meets 2 ASPEN criteria for severe chronic malnutrition (unknown if chronic disease related vs social/environemntal)  r/t unknown etiology AEB pt presents w/ severe fat wasting and severe muscle wasting.    RD available PRN.            "

## 2024-05-16 NOTE — PROGRESS NOTES
[facility] Progress Note    Admit Date: 5/15/2024    Resident(s): Roni Larson D.O.   Attending:  TERI AVALOS/ Dr. Workman    Patient ID:    Name:  Tameka Valencia     YOB: 1940  Age:  83 y.o.  female   MRN:  [id number]    Chief Complaint  Acute Encephalopathy, Shock    Hospital Course (carried forward and updated):    Patient is a 83 y.o. 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     Unable to obtain history from patient due to critical illness, history obtained from chart review, alternative  .     Temp 96F, pulse 60-100s, hypotensive 51/31, 92% on ventilator. Hgb 7.8 (7.5 since ), K6.9, CO2 5, AG 27, glucose 400, BUNs/CR 75/3.55 (9/0.41 in ), AST//99 (128/87 on ), albumin 2.8, total protein 5.2, lactic acid 8.3.  Troponin 95, BNP 23, INR 1.33.  APV-CMV 25/100/8/300, ABG 6.945/35/56.  CXR with no acute abnormalities and emphysema.  EKG with NSR, P waves seen, normal SD interval, normal QRS interval, QTc 427.  Patient received sodium bicarb 50 mEq, Versed 1 mg, LR 30 cc/kg (1.1L), LR 1L, insulin 2 units, D5W 50 mL, albuterol nebulizer in the ED.  Patient will be admitted for management of acute encephalopathy required intubation and shock.    5/15 Admitted to ICU    Consultants:  NA     Interval Events:    Had multiple BM overnight and BMS was placed  Reviewed last 24 hour events:  Neuro: RASS +3, not following commands  HR:   SBP:   Tmax: 100.4  GI: Last BM 5/15/2024, stooled 5X, type VII, BMS, large, brown,   Endo: 130s  I/O: +4125/-88/+4087  Lines: RIJ CVC, enteral tube, Guzmán  Mobility: Level 1  Resp: VD #2 APV-CMV, 22//8/300,   AB.605/34.8/193/34.6, BE +12  CXR: Thoracic  hyperinflation  Vte: Heparin  PPI/H2: Famotidine 20 mg  Antibx: Cefepime  GTT: Norepi 0.35 mcg/kg/min    WBC 9.4->13.4->12.9, Hgb 7.8->7.5->7.9  ->150, CO2 5->20 ->27, AG 27->20, BUNs/CR 67/3.15 (9/0.41 in 2020), AST//99->771/329  Bcx (5/15/24): NGTD    -CT abdomen pelvis without pending with multiple diarrhea  -Start 1/2 NS maintenance fluids for hypernatremia  -LR 1L bolus  -Cefepime down-titrated from 2g to 1g  -Called son Dennis Phillips, [phone], went straight to voicemail  -Called friend, Pura Vogt, [phone], friend of 20 years, states she has been dementia for the past 1.5 years, telling stories over and over again, nurse at [facility] knows son's phone number, she will try to get it. Nurse, Valery, from [facility] said she has no advance directive. Son is Sergo Phillips, [phone], hasn't seen son in 30-40 years. Called son, agreeable with comfort care.   -Dr. Workman called close friend of 20 years, Pura, discussed hospital course and prognosis, and decided to transition to comfort care, would like to visit today before work at 11 to say goodbye before transition to comfort care      Review of Systems  Review of Systems   Unable to perform ROS: Critical illness       Vital Signs for the last 24 hours  Temp:  [35.6 °C (96 °F)-37.8 °C (100 °F)] 36.5 °C (97.7 °F)  Pulse:  [] 83  Resp:  [5-44] 22  BP: ()/(28-99) 100/50  SpO2:  [46 %-100 %] 72 %    Hemodynamic parameters for the last 24 hours       Vent Settings for the last 24 hours  Vent Mode: APVCMV  Rate (breaths/min): 22  Vt Target (mL): 300  PEEP/CPAP: 8  P Support: 8  MAP: 9.8  Length of Weaning Trial (Hours): .75  Control VTE (exp VT): 295    Physical Exam  Physical Exam  Constitutional:       Appearance: She is ill-appearing.      Comments: Cachectic   HENT:      Head: Normocephalic and atraumatic.      Right Ear: External ear normal.      Left Ear: External ear normal.      Nose: Nose normal.      Mouth/Throat:      Mouth:  Mucous membranes are dry.      Comments: ET tube in place  Eyes:      Pupils: Pupils are equal, round, and reactive to light.   Cardiovascular:      Rate and Rhythm: Normal rate and regular rhythm.   Pulmonary:      Effort: Pulmonary effort is normal. No respiratory distress.      Breath sounds: Normal breath sounds. No stridor. No wheezing, rhonchi or rales.   Abdominal:      General: Abdomen is flat. There is no distension.      Tenderness: There is no abdominal tenderness.   Genitourinary:     Comments: Guzmán in place  Musculoskeletal:      Cervical back: Normal range of motion.      Right lower leg: No edema.      Left lower leg: No edema.   Skin:     General: Skin is dry.   Neurological:      Comments: Intubated, pupils conjugate, no gaze deviation, PERRLA, moves all 4 extremities in response to pain. Does not respond to commands   Psychiatric:      Comments: Unable to access         Medications  Current Facility-Administered Medications   Medication Dose Route Frequency Provider Last Rate Last Admin    1/2 NS infusion   Intravenous Continuous Dean Workman M.D. 83 mL/hr at 05/16/24 0645 New Bag at 05/16/24 0645    fentaNYL (Sublimaze) injection 50 mcg  50 mcg Intravenous Q15 MIN PRN Dean Workman M.D.        And    fentaNYL (Sublimaze) injection 100 mcg  100 mcg Intravenous Q15 MIN PRN Dean Workman M.D.        And    fentaNYL (SUBLIMAZE) 50 mcg/mL in 50mL (Continuous Infusion)   Intravenous Continuous Dean Workman M.D. 0.5 mL/hr at 05/16/24 0746 25 mcg/hr at 05/16/24 0746    And    propofol (DIPRIVAN) injection  0-40 mcg/kg/min Intravenous Continuous Dean Workman M.D. 3.3 mL/hr at 05/16/24 0826 15 mcg/kg/min at 05/16/24 0826    cefepime (Maxipime) 1 g in  mL IVPB  1 g Intravenous Q EVENING Dean Workman M.D.        Respiratory Therapy Consult   Nebulization Continuous RT Roni Larson D.O.        famotidine (Pepcid) tablet 20 mg  20 mg Enteral Tube DAILY Roni Larson D.O.   20  mg at 05/16/24 0535    Or    famotidine (Pepcid) injection 20 mg  20 mg Intravenous DAILY Roni Larson D.O.        senna-docusate (Pericolace Or Senokot S) 8.6-50 MG per tablet 2 Tablet  2 Tablet Enteral Tube BID Roni Larson D.O.   2 Tablet at 05/15/24 1811    And    polyethylene glycol/lytes (Miralax) Packet 1 Packet  1 Packet Enteral Tube QDAY PRN Roni Larson D.O.        And    magnesium hydroxide (Milk Of Magnesia) suspension 30 mL  30 mL Enteral Tube QDAY PRN Roni Larson D.O.        And    bisacodyl (Dulcolax) suppository 10 mg  10 mg Rectal QDAY PRN Roni Larson D.O.        lidocaine (Xylocaine) 1 % injection 2 mL  2 mL Tracheal Tube Q30 MIN PRN Roni Larson D.O.        LR (Bolus) infusion 500 mL  500 mL Intravenous Once PRN Roni Larson D.O.        heparin injection 5,000 Units  5,000 Units Subcutaneous Q8HRS Roni Larson D.O.   5,000 Units at 05/16/24 0541    insulin lispro (HumaLOG,AdmeLOG) injection  1-6 Units Subcutaneous Q6HRS Roni Larson D.O.   1 Units at 05/16/24 0142    And    dextrose 50% (D50W) injection 25 g  25 g Intravenous Q15 MIN PRN Roni Larson D.O.        thiamine (B-1) injection 100 mg  100 mg Intravenous DAILY Roni Larson D.O.   100 mg at 05/16/24 0538    norepinephrine (Levophed) 8 mg in 250 mL NS infusion (premix)  0-1 mcg/kg/min Intravenous Continuous Gurjit Alejandro M.D. 24.1 mL/hr at 05/16/24 0852 0.35 mcg/kg/min at 05/16/24 0852       Fluids    Intake/Output Summary (Last 24 hours) at 5/16/2024 0949  Last data filed at 5/16/2024 0800  Gross per 24 hour   Intake 4408.34 ml   Output 160 ml   Net 4248.34 ml       Laboratory  Recent Labs     05/15/24  1300 05/15/24  1742 05/16/24  0347   ISTATAPH 6.945* 7.424 7.605*   ISTATAPCO2 35.0 29.3 34.8   ISTATAPO2 56* 438* 193*   ISTATATCO2 9* 20 36*   DNUKMXX8SUP 69* 100* 100*   ISTATARTHCO3 7.6* 19.2 34.6*   ISTATARTBE -23* -5* 12*   ISTATTEMP see below 34.6 C 36.8 C   ISTATFIO2 100 100 50   ISTATSPEC Arterial Arterial Arterial    ISTATAPHTC  --  7.459 7.608*   MLEAGHQG8AD  --  424* 192*     Recent Labs     05/15/24  2027 05/16/24  0138 05/16/24  0241   SODIUM 151* 150* 150*   POTASSIUM 4.4 3.8 3.7   CHLORIDE 108 104 103   CO2 20 25 27   BUN 71* 70* 67*   CREATININE 3.28* 3.17* 3.15*   MAGNESIUM  --   --  2.8*   PHOSPHORUS  --   --  5.3*   CALCIUM 8.1* 7.6* 7.6*     Recent Labs     05/15/24  2027 05/16/24  0138 05/16/24  0241   ALTSGPT 309* 327* 329*   ASTSGOT 682* 759* 771*   ALKPHOSPHAT 54 53 54   TBILIRUBIN 0.2 0.2 0.2   GLUCOSE 222* 173* 161*     Recent Labs     05/15/24  1240 05/15/24  2027 05/16/24  0138 05/16/24  0241   WBC 9.4 13.4*  --  12.9*   NEUTSPOLYS 87.90* 93.30*  --  93.00*   LYMPHOCYTES 5.20* 3.80*  --  4.70*   MONOCYTES 6.00 2.20  --  1.70   EOSINOPHILS 0.00 0.00  --  0.00   BASOPHILS 0.00 0.10  --  0.10   ASTSGOT 170* 682* 759* 771*   ALTSGPT 99* 309* 327* 329*   ALKPHOSPHAT 51 54 53 54   TBILIRUBIN 0.2 0.2 0.2 0.2     Recent Labs     05/15/24  1240 05/15/24  1750 05/15/24  2027 05/16/24  0241   RBC 2.88*  --  2.77* 2.87*   HEMOGLOBIN 7.8*  --  7.5* 7.9*   HEMATOCRIT 28.7*  --  24.5* 24.4*   PLATELETCT 278  --  248 221   PROTHROMBTM 16.7* 16.1*  --   --    APTT 27.8  --   --   --    INR 1.33* 1.27*  --   --    IRON 12*  --  10*  --    FERRITIN 32.9  --   --   --    TOTIRONBC 212*  --  183*  --        Imaging  X-Ray:  I have personally reviewed the images and compared with prior images.  EKG:  I have personally reviewed the images and compared with prior images.  CT:    Reviewed  Echo:   Reviewed  Ultrasound:  Reviewed    ASSESSEMENT and PLAN:    * Acute encephalopathy- (present on admission)  Assessment & Plan  Found unresponsive by home health, GCS 3, intubated for airway protection, fasting blood sugar 35  DDx includes neurologic, endocrine, metabolic, infectious, hypovolemic shock, distributive shock including septic shock    TRANSITION TO COMFORT CARE ONCE FRIEND ARRIVES  -Telemetry monitoring  -Fall precautions,  seizure precautions, aspiration precautions  -Ventilation management as below for acute hypoxic respiratory failure  -Management as below for sepsis  -Hemodynamic support with IVF and pressors for MAP >65  -Empiric antibiotics with Cefepime and Linezolid, MRSA nares ordered, follow-up on blood cultures  -TSH, ammonia, B12, cortisol, A1C, beta-hydroxybutyrate ordered  -Empiric thiamine  -CT head without pending  -Echocardiogram ordered  -Social work to assist on locating next of kin for [facility]    Sepsis (HCC)  Assessment & Plan  This is Septic shock Present on admission  SIRS criteria identified on my evaluation include: Hypothermia, with temperature less than 96.8 deg F, Tachycardia, with heart rate greater than 90 BPM, and Tachypnea, with respirations greater than 20 per minute  Clinical indicators of end organ dysfunction include Hypotension with systolic blood pressure less than 90 or MAP less than 65, Lactic Acid greater than 2, Toxic Metabolic Encephalopathy, Acute Respiratory Failure - (mechanical ventilation or BiPap or PaO2/FiO2 ratio < 300), Acute Renal Failure, with a finding of creatinine greater than 2 (patient does not have ESRD or CKD, and GCS < 15  Indicators of septic shock include: Sepsis present and lactate level is greater than 2mmol/L after adequate fluid resuscitation   Sources is: Unknown  Sepsis protocol initiated  Crystalloid Fluid Administration: Fluid resuscitation ordered per standard protocol - 30 mL/kg per current or ideal body weight  IV antibiotics as appropriate for source of sepsis  Reassessment: I have reassessed the patient's hemodynamic status    TRANSITION TO COMFORT CARE ONCE FRIEND ARRIVES  -Sepsis protocols initiated   -Telemetry monitoring  -Broad spectrum abx:  Cefepime 1g every 12 hours, stopped Linezolid 600 mg IV BID due to  MRSA nares negative  -Follow blood cultures, urine cultures  -CT abdomen/Pelvis without pending  -[facility] discussion with family and friends  -Maintenance  IVF with  mL bolus if MAP <65 or SBP <90  -Monitor UOP, goal of 0.5 ml/kg/hr  -Trend lactate  -MAP goals > 65, may need vasopressors    Acute hypoxic respiratory failure (HCC)  Assessment & Plan  Intubated: 5/15/2024 for GCS of 3    -Telemetry monitoring  -Continuous pulse oximetry  -Goal saturation >92%  -Monitor ventilator waveforms and titrate flow/peep and volumes according.  -Lung protective ventilation strategy w/ A-F bundle  -CXR to monitor lung volumes and tube/line placement  -VAP bundle prevention, oral care, post pyloric feeding  -Head of bed > 30 degree  -GI prophylaxis  -Daily awakening and SBT trials unless contraindicated  -Monitor for liberation  -Respiratory treatments: prn    Hyperkalemia  Assessment & Plan  K 6.9  Secondary to shock and SANDRA  S/p insulin 2 units in the ED  EKG with NSR, P waves seen, normal VA interval, normal QRS interval, QTc 427, no significant EKG changes  Secondary to SANDRA and acid/base balance    -IV insulin ordered  -IV fluid resuscitation  -Continue to monitor    Lactic acidosis  Assessment & Plan  Lactic acid 8.3  Likely type a lactic acidosis    IV fluid resuscitation  Trend lactate    Acute kidney injury (HCC)  Assessment & Plan  BUNs/CR 75/3.55 (9/0.41 in 2020),   Likely secondary to prerenal vs possible intrarenal    -Maintain euvolemia and monitor fluid responsiveness  -MAP >65  -Avoid nephrotoxins and renally dose medications  -Monitor renal function and urine output    Hypoglycemia  Assessment & Plan  Fasting blood sugar 35, now glucose 400s D5W 50 mL    SSI, hypoglycemia protocol, A1c, beta-hydroxybutyrate ordered    High anion gap metabolic acidosis  Assessment & Plan  CO2 5, AG 27, LA 8.2  Combination of type a lactic acidosis and starvation ketoacidosis    IV fluid resuscitation  Trend lactate    Chronic anemia  Assessment & Plan  Hgb 7.8 (7.5 since 2020)    -H&H every 12 hours  -Continue to monitor for signs of bleeding  -Transfuse for hemoglobin  greater less than 7  -Iron panel/ferritin, reticulocyte count, B12    Transaminitis  Assessment & Plan  AST//99 (128/87 on 2019), unclear etiology possibly secondary to shock  Uptrending    -Avoid hepatotoxins  -CT Abdomen/Pelvis canceled due to transition to comfort care    Malnutrition (HCC)  Assessment & Plan  BMI 16.6, weight 85, albumin 2.8, total protein 5.2 when hypovolemic    Dietary consult    Pulmonary emphysema (HCC)- (present on admission)  Assessment & Plan  History of per chart review, no PFTs    -Continue to monitor        VTE:  Heparin  Ulcer: H2 Antagonist  Lines: Central Line  Ongoing indication addressed and Guzmán Catheter  Ongoing indication addressed    I have performed a physical exam and reviewed and updated ROS and Plan today (5/16/2024). In review of yesterday's note (5/15/2024), there are no changes except as documented above.     Discussed patient condition and risk of morbidity and/or mortality with Hospitalist, Family, RN, RT, Therapies, and Pharmacy      Roni Larson D.O.  PGY-3 Internal Medicine Resident

## 2024-05-16 NOTE — PROGRESS NOTES
Updated Dr. Merritt regarding patient's increasing temperature of 37.9 and low urine output. No new orders at this time

## 2024-05-16 NOTE — PROCEDURES
Central venous catheter insertion Note    Date: 05/15/24    Time: 1630    Procedure: Central Venous Line placement      Site: Right Internal Jugular Vein      Indication: Acute encephalopathy, shock, respiratory failure      Physician: Roni Larson DO   Supervising Physician: Gurjit Alejandro MD    Consent: Emergent      Time-out: Implied consent was obtained. Immediately prior to procedure, a time out was called to verify the correct patient, procedure, equipment, support staff and site/side marked as required.      Procedure: After obtaining implied consent, a time-out was performed. Appropriate site confirmed with ultrasound and patient positioned, prepped, and draped in sterile fashion. All those present wearing cap and mask and those physically participating remained adhering to sterile fashion with cap, mask, gloves, and gown. 5 mL of local anesthetic injected (1% lidocaine without epinephrine) achieving appropriate comfort level for patient. Vein localized and accessed using ultrasound guidance and catheter placed using Seldinger technique. Able to aspirate dark, non-pulsatile blood through each lumen and sterile saline flushed easily before capping. Line secured and dressed in sterile fashion. Wire removal: After insertion of the catheter via Seldinger technique the guidewire was removed intact and confirmed by the assistant in the room.      Patient tolerated procedure well without any difficulties and remains in care of bedside nurse. CXR will be performed to confirm appropriate placement for internal jugular or subclavian CVLs.      EBL: minimal      Complications: None      CXR: Well-positioned right IJ central catheter. No pneumothorax.     Roni Larson DO   PGY-3 IM Resident

## 2024-05-16 NOTE — ASSESSMENT & PLAN NOTE
She has dementia, emphysema, severe malnutrition, now with multiple organ failure, shock and ventilator support.    passed  Son feels this is not what she would want but has not been involved with her in 34yrs  Spoke with patient good friend for over 20yrs and care taker at times agree she would not want this type of care or ongoing support.   She would like to come by prior to transition to comfort measures.     Most appropriate care is comfort focused on my evaluation of patient with her above comorbid conditions to prevent ongoing pain and suffering.

## 2024-05-16 NOTE — THERAPY
Occupational Therapy Contact Note    Patient Name: Tameka Montanez  Age:  83 y.o., Sex:  female  Medical Record #: [id number]  Today's Date: 5/16/2024    OT consult received, spoke with PT who discussed with RN, pt not medically appropriate for evaluation at this time. Will follow up as able/appropriate.      Med Apple OTD, OTR/L

## 2024-05-16 NOTE — DISCHARGE PLANNING
Case Management Discharge Planning    Admission Date: 5/15/2024  GMLOS: 5.1  ALOS: 1    6-Clicks ADL Score:    6-Clicks Mobility Score:      Anticipated Discharge Dispo: Discharge Disposition: Still a Patient (30)      Action(s) Taken: RNCM participated in IDT rounds. Patients son has been reached, however they have not had a relationship in quite some time. Dr. Workman will discuss comfort care measures with patients long time friend, Pura.     Escalations Completed: None    Medically Clear: No    Next Steps: Comfort care measures to be discussed. No case management needs at this time, however we'll continue to follow to assist as needed.     Barriers to Discharge: patient will likely go comfort care.     Is the patient up for discharge tomorrow: No

## 2024-05-16 NOTE — THERAPY
Physical Therapy Contact Note    PT consult received and acknowledged. Discussed patient with RN; patient not medically appropriate for therapeutic intervention. Will re attempt as able and appropriate.     Valery Mixon, PT, DPT  [phone]

## 2024-05-16 NOTE — ASSESSMENT & PLAN NOTE
Intubated date: 5/15  Goal saturation > 88%  Monitor ventilator waveforms and titrate flow/peep and volumes according.   Lung protective ventilation strategy w/ A-F bundle  CXR: monitor lung volumes and tube/line placement  VAP bundle prevention, oral care, post pyloric feeding  Head of bed > 30 degree  GI prophylaxis  Daily awakening and SBT trials unless contraindicated  Monitor for liberation  Respiratory treatments: prn

## 2024-05-16 NOTE — CARE PLAN
Problem: Ventilation  Goal: Ability to achieve and maintain unassisted ventilation or tolerate decreased levels of ventilator support  Description: Target End Date:  4 days     Document on Vent flowsheet    1.  Support and monitor invasive and noninvasive mechanical ventilation  2.  Monitor ventilator weaning response  3.  Perform ventilator associated pneumonia prevention interventions  4.  Manage ventilation therapy by monitoring diagnostic test results  Outcome: Progressing     Ventilator Daily Summary    Vent Day #2  Airway: 6@22    Ventilator settings:22/300/8/30   Weaning trials: no  Respiratory Procedures:no     Plan: Continue current ventilator settings and wean mechanical ventilation as tolerated per physician orders.

## 2024-05-16 NOTE — ASSESSMENT & PLAN NOTE
Due to hypoglycemia and metabolic derangements with hx of dementia.   CT head negative  Delirium like precautions  Moving all extremities  Malnourished on thiamine replacement

## 2024-05-16 NOTE — DISCHARGE SUMMARY
[facility] Death Summary      Attending: Dean Workman M.d.  Senior Resident: Dr. Larson    Call Back Contact Number: [phone]    Admission Date  5/15/2024    Cause of Death  Septic shock due to unknown organism complicated by acute hypoxic respiratory failure hypovolemic shock and anuric renal failure in the setting of severe malnutrition and dementia    Comorbid Conditions at the Time of Death  Principal Problem:    Acute encephalopathy (POA: Yes)  Active Problems:    PAD (peripheral artery disease) (HCC) (POA: Yes)    HTN (hypertension) (POA: Yes)    Pulmonary emphysema (HCC) (POA: Yes)    Acute hypoxic respiratory failure (HCC) (POA: Unknown)    Sepsis (HCC) (POA: Unknown)    Acute kidney injury (HCC) (POA: Unknown)    Lactic acidosis (POA: Unknown)    Hyperkalemia (POA: Unknown)    Malnutrition (HCC) (POA: Unknown)    Transaminitis (POA: Unknown)    Chronic anemia (POA: Unknown)    High anion gap metabolic acidosis (POA: Unknown)    Hypoglycemia (POA: Unknown)    Goals of care, counseling/discussion (POA: Unknown)    Septic shock (HCC) (POA: Yes)  Resolved Problems:    * No resolved hospital problems. *      History of Presenting Illness and Hospital Course  Patient is a 83 y.o. female with a past medical history of chronic obstructive pulmonary disease, peripheral artery disease, colonic mass s/p right hemicolectomy showing benign tubular adenomas, who presented to the ED on 5/15/2024 due to being found unresponsive by [facility], [facility] nurse Valery, intubated PTA with fasting glucose of 35.  Patient was found to be in severe hypovolemic and septic shock with severe malnutrition requiring significant IVF fluid resuscitation and multiple vasopressors.     - Patient's  was .  Called close friend of 20 years, Pura Vogt, [phone], who states patient has been declining over the past 1.5 years with dementia. Discussed GO,  states patient did not have an advanced directive  through her home health.  Gave phone number for son, Sergo Phillips, [phone], who has not been in contact for 30-40 years.  Discussed hospital course and prognosis with son who agreed to transition to comfort care. Discussed hospital course and prognosis with close friend who was also in agreement for comfort care.  Comfort care initiated and patient passed away peacefully and naturally.  Condolences were offered to the son and friend.    Consultants  NA    Procedures  Intubation 5/15  Right internal jugular central venous line placement 5/15    Time of Death: 1621  Date of Death: 5/16/2024

## 2024-05-16 NOTE — WOUND TEAM
[facility]  Inpatient Services  Initial Wound and Skin Care Evaluation    Admission Date: 5/15/2024     Last order of IP CONSULT TO WOUND CARE was found on 5/15/2024 from Hospital Encounter on 5/15/2024     HPI, PMH, SH: Reviewed    Past Surgical History:   Procedure Laterality Date    COLON RESECTION ROBOTIC XI  2/18/2020    Procedure: RIGHT BRADY COLECTOMY, ROBOT-ASSISTED, USING DA LON XI;  Surgeon: Wes Pat M.D.;  Location: SURGERY [facility];  Service: General    COLONOSCOPY WITH POLYP N/A 2/16/2020    Procedure: COLONOSCOPY, WITH POLYPECTOMY, tattoo ink, biopsies;  Surgeon: Jc Rolon M.D.;  Location: SURGERY [facility];  Service: Gastroenterology    GASTROSCOPY N/A 2/14/2020    Procedure: GASTROSCOPY;  Surgeon: Jc Rolon M.D.;  Location: SURGERY SAME DAY [facility];  Service: Gastroenterology    APPENDECTOMY       Social History     Tobacco Use    Smoking status: Every Day     Current packs/day: 1.00     Average packs/day: 1 pack/day for 42.0 years (42.0 ttl pk-yrs)     Types: Cigarettes    Smokeless tobacco: Never   Substance Use Topics    Alcohol use: No     Chief Complaint   Patient presents with    Respiratory Distress     Pt found by home health unresponsive. Pt intubated pta     Unresponsive    Low Blood Sugar     Fs-35 pta      Diagnosis: Acute encephalopathy [G93.40]    Unit where seen by Wound Team: [id number]     WOUND CONSULT RELATED TO:  BL ears, BL heels, R lateral ankle, sacrum, and R shoulder    WOUND TEAM PLAN OF CARE - Frequency of Follow-up:   Nursing to follow dressing orders written for wound care. Contact wound team if area fails to progress, deteriorates or with any questions/concerns if something comes up before next scheduled follow up (See below as to whether wound is following and frequency of wound follow up)   Not following, consult as needed  -      WOUND HISTORY:      Found unresponsive by home health. FSBS 35    WOUND  ASSESSMENT/LDA  Wound 05/15/24 Pressure Injury Ankle Lateral Right POA st 2 (Active)   Date First Assessed/Time First Assessed: 05/15/24 1715   Present on Original Admission: Yes  Primary Wound Type: Pressure Injury  Location: Ankle  Wound Orientation: Lateral  Laterality: Right  Wound Description (Comments): POA st 2      Assessments 5/16/2024 11:00 AM   Wound Image     Site Assessment Red;Yellow   Periwound Assessment Flaky;Fragile   Margins Defined edges;Attached edges   Closure Open to air   Drainage Amount Scant   Drainage Description Serous   Treatments Site care;Offloading   Periwound Protectant Skin Protectant Wipes to Periwound   Dressing Status Clean;Dry;Intact   Dressing Changed New   Dressing Cleansing/Solutions Not Applicable   Dressing Options Offloading Dressing - Heel;Hydrocolloid Thin   Dressing Change/Treatment Frequency Every 72 hrs, and As Needed   Wound Team Following Not following   Pressure Injury Stage Stage 2        Vascular:    GARY:   No results found.    Lab Values:    Lab Results   Component Value Date/Time    WBC 12.9 (H) 05/16/2024 02:41 AM    RBC 2.87 (L) 05/16/2024 02:41 AM    HEMOGLOBIN 7.9 (L) 05/16/2024 02:41 AM    HEMATOCRIT 24.4 (L) 05/16/2024 02:41 AM    HBA1C 4.9 05/15/2024 12:40 PM         Culture Results show:  No results found for this or any previous visit (from the past 720 hour(s)).    Pain Level/Medicated:   Intubated        INTERVENTIONS BY WOUND TEAM:  Chart and images reviewed. Discussed with bedside RN. All areas of concern (based on picture review, LDA review and discussion with bedside RN) have been thoroughly assessed. Documentation of areas based on significant findings. This RN in to assess patient. Performed standard wound care which includes appropriate positioning, dressing removal and non-selective debridement. Pictures and measurements obtained weekly if/when required.    Wound:  R lateral ankle   Preparation for Dressing removal: Open to  air  Cleansed/Non-selectively Debrided with:  Gauze  [place] wound: Cleansed with Gauze, Prepped with No Sting  Primary Dressing:  hydrocolloid thin   Secondary (Outer) Dressing: mepilex    Advanced Wound Care Discharge Planning  Number of Clinicians necessary to complete wound care: 1  Is patient requiring IV pain medications for dressing changes:  No   Length of time for dressing change 30 min. (This does not include chart review, pre-medication time, set up, clean up or time spent charting.)    Interdisciplinary consultation: Patient, Bedside RN, Lucy KUNZ (Wound RN).  Pressure injury and staging reviewed with N/A.    EVALUATION / RATIONALE FOR TREATMENT:     Date:  05/16/24  Wound Status:  Initial evaluation    R lateral ankle with shallow ulceration over bony prominence, likely a stage two pressure injury. Hydrocolloid thin applied and protected with heel offloading dressing. BL heels intact, however patient with diffuse mottling throughout distal lower extremities. BL ears also intact.     Unfortunately patient too unstable to turn at this time thus R shoulder and sacrum were not assessed.          Goals: Steady decrease in wound area and depth weekly.    NURSING PLAN OF CARE ORDERS:  Dressing changes: See Dressing Care orders  Skin care: See Skin Care orders    NUTRITION RECOMMENDATIONS   Wound Team Recommendations:  N/A    DIET ORDERS (From admission to next 24h)       Start     Ordered    05/15/24 1505  Diet NPO Restrict to: Strict (okay to receive meds through the NG/OG tube)  ALL MEALS        Question Answer Comment   Type: Now    Diet NPO Restrict to: Strict okay to receive meds through the NG/OG tube       05/15/24 1507                    PREVENTATIVE INTERVENTIONS:    Q shift Bharath - performed per nursing policy  Q shift pressure point assessments - performed per nursing policy    Surface/Positioning  ICU Low Airloss - Currently in Place  Reposition q 2 hours - Currently in Place  TAPs Turning system -  Currently in Place    Offloading/Redistribution  Heel offloading dressing (Silicone dressing) - Currently in Place      Respiratory  Anchorfast - Currently in Place    Anticipated discharge plans:  TBD        Vac Discharge Needs:  Vac Discharge plan is purely a recommendation from wound team and not a requirement for discharge unless otherwise stated by physician.  Not Applicable Pt not on a wound vac

## 2024-05-16 NOTE — PROGRESS NOTES
Updated Dr. Merritt that patient bowel movement is liquid with frequent output. Received orders for BMS

## 2024-05-16 NOTE — PROGRESS NOTES
Critical Care Progress Note    Date of admission  5/15/2024    Chief Complaint  83 y.o. 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 Found to be in shock SBP in 50's, hypoglycemic to 35, AMS and multiple organ failure and was intubated and placed on mechanical support and admitted to ICU for ongoing care.     Hospital Course  Admitted to ICU 5/15 for multiple organ failure    Interval Problem Update  Reviewed last 24 hour events:  Neuro: awake, moves all agitated not following commands  HR: 90's  SBP: on norepi 0.3 map > 65  Tmax: afebrile  GI: BM today  UOP: 78ml total  Lines: central line, alcantara  Resp: VD 2 22 300 8 30% SBT/SAT RSBI high 30minutes  Vte: heparin  PPI/H2:pepcid  Antibx: cefepime  +4L  Start 1/2NS at 83ml/hr  Aspirin level, beta hydroxy   CT abdomen noncontrast  Palliative care, ethics  LR bolus IV x1L  Echo hyperdynamic and dilated RV, IVC kissing  Comfort care is what son would want but hasn't seen her in 34 years  Spoke with her good friend Pura that takes care of her and she also echos this is not what she would want and would like to come see her before we transition to comfort focus care.       Review of Systems  Review of Systems   Unable to perform ROS: Intubated        Vital Signs for last 24 hours   Temp:  [35.6 °C (96 °F)-37.8 °C (100 °F)] 36.5 °C (97.7 °F)  Pulse:  [] 83  Resp:  [5-44] 21  BP: ()/(28-99) 73/47  SpO2:  [46 %-100 %] 86 %    Hemodynamic parameters for last 24 hours       Respiratory Information for the last 24 hours  Vent Mode: APVCMV  Rate (breaths/min): 22  Vt Target (mL): 300  PEEP/CPAP: 8  P Support: 8  MAP: 9.8  Length of Weaning Trial (Hours): .75  Control VTE (exp VT): 295    Physical Exam   Physical Exam  Vitals and nursing note reviewed.   Constitutional:       General: She is in acute distress.      Appearance: She is ill-appearing.      Comments: Severely malnourished  ill appearing patient on ventilator   HENT:      Mouth/Throat:      Comments: ET in place  Cardiovascular:      Rate and Rhythm: Normal rate.      Heart sounds: No murmur heard.  Pulmonary:      Effort: No respiratory distress.      Breath sounds: No stridor. No wheezing or rhonchi.   Abdominal:      General: There is no distension.      Palpations: There is no mass.      Tenderness: There is abdominal tenderness. There is rebound. There is no guarding.      Hernia: No hernia is present.   Musculoskeletal:         General: No swelling or tenderness.   Skin:     Coloration: Skin is pale. Skin is not jaundiced.      Comments: Cold hands and feet   Neurological:      Comments: She is awake on ventilator and moving all extremities, not following commands         Medications  Current Facility-Administered Medications   Medication Dose Route Frequency Provider Last Rate Last Admin    1/2 NS infusion   Intravenous Continuous Dean Workman M.D. 83 mL/hr at 05/16/24 0645 New Bag at 05/16/24 0645    fentaNYL (Sublimaze) injection 50 mcg  50 mcg Intravenous Q15 MIN PRN Dean Workman M.D.        And    fentaNYL (Sublimaze) injection 100 mcg  100 mcg Intravenous Q15 MIN PRN Dean Workman M.D.        And    fentaNYL (SUBLIMAZE) 50 mcg/mL in 50mL (Continuous Infusion)   Intravenous Continuous Dean Workman M.D. 0.5 mL/hr at 05/16/24 0746 25 mcg/hr at 05/16/24 0746    And    propofol (DIPRIVAN) injection  0-40 mcg/kg/min Intravenous Continuous Dean Workman M.D. 3.3 mL/hr at 05/16/24 0826 15 mcg/kg/min at 05/16/24 0826    cefepime (Maxipime) 1 g in  mL IVPB  1 g Intravenous Q EVENING Dean Workman M.D.        Respiratory Therapy Consult   Nebulization Continuous RT Roni Larson D.O.        famotidine (Pepcid) tablet 20 mg  20 mg Enteral Tube DAILY Roni Larson D.O.   20 mg at 05/16/24 0535    Or    famotidine (Pepcid) injection 20 mg  20 mg Intravenous DAILY Roni Larson D.O.        senna-docusate  (Pericolace Or Senokot S) 8.6-50 MG per tablet 2 Tablet  2 Tablet Enteral Tube BID Roni Larson D.O.   2 Tablet at 05/15/24 1811    And    polyethylene glycol/lytes (Miralax) Packet 1 Packet  1 Packet Enteral Tube QDAY PRN Roni Larson D.O.        And    magnesium hydroxide (Milk Of Magnesia) suspension 30 mL  30 mL Enteral Tube QDAY PRN Roni Larson D.O.        And    bisacodyl (Dulcolax) suppository 10 mg  10 mg Rectal QDAY PRN Roni Larson D.O.        lidocaine (Xylocaine) 1 % injection 2 mL  2 mL Tracheal Tube Q30 MIN PRN Roni Larson D.O.        LR (Bolus) infusion 500 mL  500 mL Intravenous Once PRN Roni Larson D.O.        heparin injection 5,000 Units  5,000 Units Subcutaneous Q8HRS Roni Larson D.O.   5,000 Units at 05/16/24 0541    insulin lispro (HumaLOG,AdmeLOG) injection  1-6 Units Subcutaneous Q6HRS Roni Larson D.O.   1 Units at 05/16/24 0142    And    dextrose 50% (D50W) injection 25 g  25 g Intravenous Q15 MIN PRN Roni Larson D.O.        thiamine (B-1) injection 100 mg  100 mg Intravenous DAILY Roni Larson D.O.   100 mg at 05/16/24 0538    norepinephrine (Levophed) 8 mg in 250 mL NS infusion (premix)  0-1 mcg/kg/min Intravenous Continuous Gurjit Alejandro M.D. 24.1 mL/hr at 05/16/24 0852 0.35 mcg/kg/min at 05/16/24 0852       Fluids    Intake/Output Summary (Last 24 hours) at 5/16/2024 0941  Last data filed at 5/16/2024 0800  Gross per 24 hour   Intake 4408.34 ml   Output 160 ml   Net 4248.34 ml       Laboratory  Recent Labs     05/15/24  1300 05/15/24  1742 05/16/24  0347   ISTATAPH 6.945* 7.424 7.605*   ISTATAPCO2 35.0 29.3 34.8   ISTATAPO2 56* 438* 193*   ISTATATCO2 9* 20 36*   JQEUSWK5QXI 69* 100* 100*   ISTATARTHCO3 7.6* 19.2 34.6*   ISTATARTBE -23* -5* 12*   ISTATTEMP see below 34.6 C 36.8 C   ISTATFIO2 100 100 50   ISTATSPEC Arterial Arterial Arterial   ISTATAPHTC  --  7.459 7.608*   [id number]  --  424* 192*     Recent Labs     05/15/24  1240   CPKTOTAL 197*     Recent Labs      05/15/24  2027 05/16/24  0138 05/16/24  0241   SODIUM 151* 150* 150*   POTASSIUM 4.4 3.8 3.7   CHLORIDE 108 104 103   CO2 20 25 27   BUN 71* 70* 67*   CREATININE 3.28* 3.17* 3.15*   MAGNESIUM  --   --  2.8*   PHOSPHORUS  --   --  5.3*   CALCIUM 8.1* 7.6* 7.6*     Recent Labs     05/15/24  2027 05/16/24  0138 05/16/24  0241   ALTSGPT 309* 327* 329*   ASTSGOT 682* 759* 771*   ALKPHOSPHAT 54 53 54   TBILIRUBIN 0.2 0.2 0.2   GLUCOSE 222* 173* 161*     Recent Labs     05/15/24  1240 05/15/24  2027 05/16/24  0138 05/16/24  0241   WBC 9.4 13.4*  --  12.9*   NEUTSPOLYS 87.90* 93.30*  --  93.00*   LYMPHOCYTES 5.20* 3.80*  --  4.70*   MONOCYTES 6.00 2.20  --  1.70   EOSINOPHILS 0.00 0.00  --  0.00   BASOPHILS 0.00 0.10  --  0.10   ASTSGOT 170* 682* 759* 771*   ALTSGPT 99* 309* 327* 329*   ALKPHOSPHAT 51 54 53 54   TBILIRUBIN 0.2 0.2 0.2 0.2     Recent Labs     05/15/24  1240 05/15/24  1750 05/15/24  2027 05/16/24  0241   RBC 2.88*  --  2.77* 2.87*   HEMOGLOBIN 7.8*  --  7.5* 7.9*   HEMATOCRIT 28.7*  --  24.5* 24.4*   PLATELETCT 278  --  248 221   PROTHROMBTM 16.7* 16.1*  --   --    APTT 27.8  --   --   --    INR 1.33* 1.27*  --   --    IRON 12*  --  10*  --    FERRITIN 32.9  --   --   --    TOTIRONBC 212*  --  183*  --        Imaging  X-Ray:  I have personally reviewed the images and compared with prior images.  CT:    Reviewed    Assessment/Plan  * Acute encephalopathy- (present on admission)  Assessment & Plan  Due to hypoglycemia and metabolic derangements with hx of dementia.   CT head negative  Delirium like precautions  Moving all extremities  Malnourished on thiamine replacement    Goals of care, counseling/discussion  Assessment & Plan  She has dementia, emphysema, severe malnutrition, now with multiple organ failure, shock and ventilator support.    passed  Son feels this is not what she would want but has not been involved with her in 34yrs  Spoke with patient good friend for over 20yrs and care taker at  times agree she would not want this type of care or ongoing support.   She would like to come by prior to transition to comfort measures.     Most appropriate care is comfort focused on my evaluation of patient with her above comorbid conditions to prevent ongoing pain and suffering.         Hypoglycemia  Assessment & Plan  Likely due to malnutrition and poor hepatic store of glycogen  Early enteral nutrition  Serial glucose monitoring     High anion gap metabolic acidosis  Assessment & Plan  Has triple ripple   Respiratory alkalsosis, metabolic alkalosis, Anion gap acidosis not all lacate related  Check aspirin level, check betahydroxy level  Delta gap  Check albumin and adjust  Stop bicarb gtt and start 1/2NS for alkalosis and volume resus    Chronic anemia  Assessment & Plan  Likely due to malnutrition  Serial monitor for bleeding and restrictive transfusion    Transaminitis  Assessment & Plan  Serial monitor  CPK low  Avoid hepatoxins  Elevated ammonia    Malnutrition (HCC)  Assessment & Plan  Severe on exam  Nutrition consultation  Thiamine and vitamin replacement    Hyperkalemia  Assessment & Plan  Serial monitor for shifting therapies  Not an iHD candidate with her wishes and advance age    Acute kidney injury (HCC)  Assessment & Plan  Going into anuric renal failure, iHD not within goals of care  Maintain euvolemia and monitor fluid responsiveness (avoid NaCL and renal congestion)  MAP > 65 uses pressors or inotropic trial  Monitor urine output and I&O's  Avoid and review nephrotoxin medication  Rule out post obstruction  Consider renal U/S if no renal images  U/a and CPK    Ddx: ATN, prerenal, AIN, CHENTE, obstructive, SIRS induced, embolic, vascular, pulmonary renal syndromes, GBM  Consider: lasix stress test, stop negative inotrope (BB, CaB, Ace/ARB)      Acute hypoxic respiratory failure (HCC)  Assessment & Plan  Intubated date: 5/15  Goal saturation > 88%  Monitor ventilator waveforms and titrate flow/peep  and volumes according.   Lung protective ventilation strategy w/ A-F bundle  CXR: monitor lung volumes and tube/line placement  VAP bundle prevention, oral care, post pyloric feeding  Head of bed > 30 degree  GI prophylaxis  Daily awakening and SBT trials unless contraindicated  Monitor for liberation  Respiratory treatments: prn      Pulmonary emphysema (HCC)- (present on admission)  Assessment & Plan  RT protocols  Sat goal of 88% for V/Q matching  Poor prognosis    HTN (hypertension)- (present on admission)  Assessment & Plan  Hx of hold while in shock on pressors    PAD (peripheral artery disease) (HCC)- (present on admission)  Assessment & Plan  Hx of         VTE:  Heparin  Ulcer: H2 Antagonist  Lines: Central Line  Ongoing indication addressed and Guzmán Catheter  Ongoing indication addressed    I have performed a physical exam and reviewed and updated ROS and Plan today (5/16/2024). In review of yesterday's note (5/15/2024), there are no changes except as documented above.     Discussed patient condition and risk of morbidity and/or mortality with Family, RN, RT, Pharmacy, , Code status disscussed, Charge nurse / hot rounds, and Patient    The patient remains critically ill with multiple organ failure, titration of pressors, bicarb, ventilator and discussions with family and friends.  Critical care time = 85 minutes in directly providing and coordinating critical care and extensive data review.  No time overlap and excludes procedures.

## 2024-05-16 NOTE — CONSULTS
Palliative Care follow-up  Consult received and EMR reviewed. Discussed with MD, JANNY established and patient transitioning to comfort focused care. No further PC needs at this time. Please consult if needs rise.    Thank you for allowing Palliative Care to support this patient and family. Contact [phone] for additional assistance, change in patient status, or with any questions/concerns.      SVEN Leon  Palliative Care Nurse Practitioner   [phone]

## 2024-05-16 NOTE — ASSESSMENT & PLAN NOTE
Has triple ripple   Respiratory alkalsosis, metabolic alkalosis, Anion gap acidosis not all lacate related  Check aspirin level, check betahydroxy level  Delta gap  Check albumin and adjust  Stop bicarb gtt and start 1/2NS for alkalosis and volume resus

## 2024-05-16 NOTE — PROGRESS NOTES
4 Eyes Skin Assessment Completed by XUAN Mosley and XUAN Pickard.    Head WDL  Ears Redness and Non-Blanching  Nose Redness and Blanching  Mouth Redness  Neck WDL  Breast/Chest WDL  Shoulder Blades Redness and Blanching  Spine Redness and Blanching  (R) Arm/Elbow/Hand Bruising  (L) Arm/Elbow/Hand Bruising  Abdomen WDL  Groin WDL  Scrotum/Coccyx/Buttocks WDL  (R) Leg Bruising  (L) Leg Bruising  (R) Heel/Foot/Toe Ulcer(s)  (L) Heel/Foot/Toe Redness and Blanching          Devices In Places Guzmán, ET Tube, and Central Line

## 2024-05-16 NOTE — CARE PLAN
The patient is Watcher - Medium risk of patient condition declining or worsening    Shift Goals  Clinical Goals: Maintain SBP greater 90 and Map greater than 65  Patient Goals: VANE  Family Goals: VANE    Progress made toward(s) clinical / shift goals:    Maintained SBP greater than 90 and Map greater than 65 with vasopressor medications. Controlled pain with drip and PRN medications    Problem: Safety - Medical Restraint  Goal: Remains free of injury from restraints (Restraint for Interference with Medical Device)  Outcome: Progressing  Goal: Free from restraint(s) (Restraint for Interference with Medical Device)  Outcome: Progressing     Problem: Knowledge Deficit - Standard  Goal: Patient and family/care givers will demonstrate understanding of plan of care, disease process/condition, diagnostic tests and medications  Outcome: Progressing     Problem: Hemodynamics  Goal: Patient's hemodynamics, fluid balance and neurologic status will be stable or improve  Outcome: Progressing     Problem: Fluid Volume  Goal: Fluid volume balance will be maintained  Outcome: Progressing     Problem: Urinary - Renal Perfusion  Goal: Ability to achieve and maintain adequate renal perfusion and functioning will improve  Outcome: Progressing     Problem: Pain - Standard  Goal: Alleviation of pain or a reduction in pain to the patient’s comfort goal  Outcome: Progressing     Problem: Fall Risk  Goal: Patient will remain free from falls  Outcome: Progressing

## 2024-05-16 NOTE — ASSESSMENT & PLAN NOTE
Likely due to malnutrition and poor hepatic store of glycogen  Early enteral nutrition  Serial glucose monitoring

## 2024-05-16 NOTE — ASSESSMENT & PLAN NOTE
Going into anuric renal failure, iHD not within goals of care  Maintain euvolemia and monitor fluid responsiveness (avoid NaCL and renal congestion)  MAP > 65 uses pressors or inotropic trial  Monitor urine output and I&O's  Avoid and review nephrotoxin medication  Rule out post obstruction  Consider renal U/S if no renal images  U/a and CPK    Ddx: ATN, prerenal, AIN, CHENTE, obstructive, SIRS induced, embolic, vascular, pulmonary renal syndromes, GBM  Consider: lasix stress test, stop negative inotrope (BB, CaB, Ace/ARB)

## 2024-05-17 LAB
BACTERIA UR CULT: NORMAL
SIGNIFICANT IND 70042: NORMAL
SITE SITE: NORMAL
SOURCE SOURCE: NORMAL

## 2024-05-20 LAB
BACTERIA BLD CULT: NORMAL
BACTERIA BLD CULT: NORMAL
SIGNIFICANT IND 70042: NORMAL
SIGNIFICANT IND 70042: NORMAL
SITE SITE: NORMAL
SITE SITE: NORMAL
SOURCE SOURCE: NORMAL
SOURCE SOURCE: NORMAL

## 2024-05-28 NOTE — DOCUMENTATION QUERY
[facility]                                                                       Query Response Note      PATIENT:               HENRY GARIBAY  ACCT #:                  [id number]  MRN:                     [id number]  :                      1940  ADMIT DATE:       5/15/2024 12:25 PM  DISCH DATE:        2024 4:21 PM  RESPONDING  PROVIDER #:        [id number]           QUERY TEXT:    The wound care consult indicates this patient is being treated for ulcer of the following site:       Pressure Injury Ankle Lateral Right stage 2 (Active)    Based on your medical judgement, can you please confirm this clinical finding?     Note: If you agree with a diagnosis listed, please remember to include it in your concurrent daily documentation and onto the Discharge Summary.      The patient's Clinical Indicators include:  - Findings:  Wound care consult 24:  Pressure Injury Ankle Lateral Right POA stage 2 (Active)    - Treatments:  wound care consult, offloading, hydrocolloid thin     - Risk factors:  advanced age, malnutrition, cachexia, PVD, hypovolemia, nicotine dependence, anemia    Thank You,  Akiko Baez RN  Clinical Documentation   [email or web address]  Connect via [email or web address] Messenger  Options provided:   -- Pressure Injury Ankle Lateral Right stage 2  present on admission   -- Other explanation, (please specify other explanation)      Query created by: Akiko Baez on 2024 7:53 PM    RESPONSE TEXT:    Pressure Injury Ankle Lateral Right stage 2 present on admission          Electronically signed by:  ATUL GAGNON MD 2024 2:25 PM

## (undated) DEVICE — SLEEVE, VASO, THIGH, MED

## (undated) DEVICE — SUTURE GENERAL

## (undated) DEVICE — DRAPE MAYO STAND - (30/CA)

## (undated) DEVICE — TRAP POLYP E-TRAP (25EA/BX)

## (undated) DEVICE — KIT ANESTHESIA W/CIRCUIT & 3/LT BAG W/FILTER (20EA/CA)

## (undated) DEVICE — BLADE SURGICAL #10 - (50/BX)

## (undated) DEVICE — GOWN WARMING STANDARD FLEX - (30/CA)

## (undated) DEVICE — SENSOR SPO2 NEO LNCS ADHESIVE (20/BX) SEE USER NOTES

## (undated) DEVICE — ROBOTIC SURGERY SERVICES

## (undated) DEVICE — WATER IRRIG. STER. 1500 ML - (9/CA)

## (undated) DEVICE — RELOAD STAPLER SUREFORM 60 BLUE (12EA/BX)

## (undated) DEVICE — Device

## (undated) DEVICE — GLOVE BIOGEL SZ 7.5 SURGICAL PF LTX - (50PR/BX 4BX/CA)

## (undated) DEVICE — LACTATED RINGERS INJ 1000 ML - (14EA/CA 60CA/PF)

## (undated) DEVICE — COVER TIP ENDOWRIST HOT SHEAR - (10EA/BX) DA VINCI

## (undated) DEVICE — SET SUCTION/IRRIGATION WITH DISPOSABLE TIP (6/CA )PART #0250-070-520 IS A SUB

## (undated) DEVICE — PAD LAP STERILE 18 X 18 - (5/PK 40PK/CA)

## (undated) DEVICE — OBTURATOR BLADELESS STANDARD 8MM (6EA/BX)

## (undated) DEVICE — SET LEADWIRE 5 LEAD BEDSIDE DISPOSABLE ECG (1SET OF 5/EA)

## (undated) DEVICE — TROCAR Z THREAD12MM OPTICAL - NON BLADED (6/BX)

## (undated) DEVICE — SUTURE 2-0 20CM STRATAFIX SPIRAL SH NEEDLE (12/BX)

## (undated) DEVICE — SUTURE 0 COATED VICRYL 6-18IN - (12PK/BX)

## (undated) DEVICE — TUBE E-T HI-LO CUFF 7.0MM (10EA/PK)

## (undated) DEVICE — KIT ROOM DECONTAMINATION

## (undated) DEVICE — ARMREST CRADLE FOAM - (2PR/PK 12PR/CA)

## (undated) DEVICE — SYRINGE 30 ML LS (56/BX)

## (undated) DEVICE — TUBING INSUFFLATION - (10/BX)

## (undated) DEVICE — TOWELS CLOTH SURGICAL - (4/PK 20PK/CA)

## (undated) DEVICE — TRAY CATHETER FOLEY URINE METER W/STATLOCK 350ML (10EA/CA)

## (undated) DEVICE — SUCTION INSTRUMENT YANKAUER BULBOUS TIP W/O VENT (50EA/CA)

## (undated) DEVICE — BITE BLOCK ADULT 60FR (100EA/CA)

## (undated) DEVICE — BABCOCK 10MM ENDO (6EA/BX)

## (undated) DEVICE — DRAPE ARM  BOX OF 20

## (undated) DEVICE — GLOVE BIOGEL PI INDICATOR SZ 6.5 SURGICAL PF LF - (50/BX 4BX/CA)

## (undated) DEVICE — SUTURE 1 VICRYL PLUS CTX - 8 X 18 INCH (12/BX)

## (undated) DEVICE — ELECTRODE DUAL RETURN W/ CORD - (50/PK)

## (undated) DEVICE — SET EXTENSION WITH 2 PORTS (48EA/CA) ***PART #2C8610 IS A SUBSTITUTE*****

## (undated) DEVICE — CLIP HEMOLOCK PURPLE - (14/BX)

## (undated) DEVICE — TUBE NG SALEM SUMP 16FR (50EA/CA)

## (undated) DEVICE — STAPLER SUREFORM 60 12 MM (6EA/BX)

## (undated) DEVICE — CANISTER SUCTION 3000ML MECHANICAL FILTER AUTO SHUTOFF MEDI-VAC NONSTERILE LF DISP  (40EA/CA)

## (undated) DEVICE — DRAPE COLUMN  BOX OF 20

## (undated) DEVICE — GLOVE BIOGEL INDICATOR SZ 8 SURGICAL PF LTX - (50/BX 4BX/CA)

## (undated) DEVICE — SUTURE 4-0 MONOCRYL PLUS PS-2 - 27 INCH (36/BX)

## (undated) DEVICE — GOWN SURGEONS X-LARGE - DISP. (30/CA)

## (undated) DEVICE — NEPTUNE 4 PORT MANIFOLD - (20/PK)

## (undated) DEVICE — MASK ANESTHESIA ADULT  - (100/CA)

## (undated) DEVICE — SEALER VESSEL EXTEND FROM DAVINCI ENERGY (6EA/BX)

## (undated) DEVICE — PROTECTOR ULNA NERVE - (36PR/CA)

## (undated) DEVICE — CATHERTER CLEAR SINGLE USE INJECTION THERAPY NEEDLE 25GA X 4MM  2.3MM X 240CM (5EA/BX)

## (undated) DEVICE — HEAD HOLDER JUNIOR/ADULT

## (undated) DEVICE — SEAL 5MM-8MM UNIVERSAL  BOX OF 10

## (undated) DEVICE — TATTOO ENDOSCOPIC SPOT EX (10EA/BX)

## (undated) DEVICE — SUTURE 3-0 VICRYL PLUS SH - 27 INCH (36/BX)

## (undated) DEVICE — SODIUM CHL IRRIGATION 0.9% 1000ML (12EA/CA)

## (undated) DEVICE — REDUCER XI STAPLER 12MM TO 8MM (6EA/BX)

## (undated) DEVICE — GLOVE BIOGEL SZ 6 PF LATEX - (50EA/BX 4BX/CA)

## (undated) DEVICE — JELLY SURGILUBE STERILE TUBE 4.25 OZ (1/EA)

## (undated) DEVICE — FILM CASSETTE ENDO

## (undated) DEVICE — SEAL CANNULA STAPLER 12 MM (10EA/BX)

## (undated) DEVICE — PAD OR TABLE DA VINCI 2IN X 20IN X 72IN - (12EA/CA)

## (undated) DEVICE — KIT CUSTOM PROCEDURE SINGLE FOR ENDO  (15/CA)

## (undated) DEVICE — STAPLER SKIN DISP - (6/BX 10BX/CA) VISISTAT

## (undated) DEVICE — ELECTRODE 850 FOAM ADHESIVE - HYDROGEL RADIOTRNSPRNT (50/PK)

## (undated) DEVICE — NEEDLE INSFL 120MM 14GA VRRS - (20/BX)

## (undated) DEVICE — CONTAINER, SPECIMEN, STERILE

## (undated) DEVICE — GLOVE BIOGEL SZ 8 SURGICAL PF LTX - (50PR/BX 4BX/CA)

## (undated) DEVICE — TUBING CLEARLINK DUO-VENT - C-FLO (48EA/CA)